# Patient Record
Sex: MALE | Race: WHITE | NOT HISPANIC OR LATINO
[De-identification: names, ages, dates, MRNs, and addresses within clinical notes are randomized per-mention and may not be internally consistent; named-entity substitution may affect disease eponyms.]

---

## 2019-05-13 PROBLEM — Z00.00 ENCOUNTER FOR PREVENTIVE HEALTH EXAMINATION: Status: ACTIVE | Noted: 2019-05-13

## 2019-05-14 ENCOUNTER — APPOINTMENT (OUTPATIENT)
Dept: GASTROENTEROLOGY | Facility: CLINIC | Age: 84
End: 2019-05-14
Payer: MEDICARE

## 2019-05-14 VITALS
BODY MASS INDEX: 32.5 KG/M2 | WEIGHT: 227 LBS | DIASTOLIC BLOOD PRESSURE: 73 MMHG | SYSTOLIC BLOOD PRESSURE: 134 MMHG | HEART RATE: 55 BPM | TEMPERATURE: 98 F | HEIGHT: 70 IN

## 2019-05-14 DIAGNOSIS — R19.7 DIARRHEA, UNSPECIFIED: ICD-10-CM

## 2019-05-14 PROCEDURE — 99204 OFFICE O/P NEW MOD 45 MIN: CPT

## 2019-05-14 RX ORDER — METOPROLOL SUCCINATE 100 MG/1
100 TABLET, EXTENDED RELEASE ORAL
Refills: 0 | Status: ACTIVE | COMMUNITY

## 2019-05-14 RX ORDER — ENALAPRIL MALEATE 20 MG/1
20 TABLET ORAL
Refills: 0 | Status: ACTIVE | COMMUNITY

## 2019-05-14 RX ORDER — LOPERAMIDE HYDROCHLORIDE 1 MG/7.5ML
1 SOLUTION ORAL
Qty: 1 | Refills: 0 | Status: ACTIVE | COMMUNITY
Start: 2019-05-14 | End: 1900-01-01

## 2019-05-14 RX ORDER — ATORVASTATIN CALCIUM 20 MG/1
20 TABLET, FILM COATED ORAL
Refills: 0 | Status: ACTIVE | COMMUNITY

## 2019-05-14 RX ORDER — MELOXICAM 7.5 MG/1
7.5 TABLET ORAL
Refills: 0 | Status: ACTIVE | COMMUNITY

## 2019-05-14 NOTE — PHYSICAL EXAM
[General Appearance - Alert] : alert [Sclera] : the sclera and conjunctiva were normal [General Appearance - In No Acute Distress] : in no acute distress [PERRL With Normal Accommodation] : pupils were equal in size, round, and reactive to light [Extraocular Movements] : extraocular movements were intact [Outer Ear] : the ears and nose were normal in appearance [Neck Appearance] : the appearance of the neck was normal [Oropharynx] : the oropharynx was normal [Neck Cervical Mass (___cm)] : no neck mass was observed [Jugular Venous Distention Increased] : there was no jugular-venous distention [Thyroid Diffuse Enlargement] : the thyroid was not enlarged [Thyroid Nodule] : there were no palpable thyroid nodules [Auscultation Breath Sounds / Voice Sounds] : lungs were clear to auscultation bilaterally [Heart Sounds] : normal S1 and S2 [Heart Rate And Rhythm] : heart rate was normal and rhythm regular [Heart Sounds Gallop] : no gallops [Murmurs] : no murmurs [Heart Sounds Pericardial Friction Rub] : no pericardial rub [Abdomen Soft] : soft [Bowel Sounds] : normal bowel sounds [Abdomen Tenderness] : non-tender [Abdomen Mass (___ Cm)] : no abdominal mass palpated [] : no hepato-splenomegaly [FreeTextEntry1] : ight upper quadrant scar

## 2019-05-14 NOTE — HISTORY OF PRESENT ILLNESS
[FreeTextEntry1] : Patient really developed watery diarrhea that lasted 2 weeks or so not related to antibiotics with borborygmi. He denies any nausea vomiting he denied rectal bleeding his medication he took Imodium which seemed to control it. A culture for Clostridium difficile and

## 2019-05-18 ENCOUNTER — MOBILE ON CALL (OUTPATIENT)
Age: 84
End: 2019-05-18

## 2019-05-20 ENCOUNTER — MOBILE ON CALL (OUTPATIENT)
Age: 84
End: 2019-05-20

## 2019-05-21 ENCOUNTER — INPATIENT (INPATIENT)
Facility: HOSPITAL | Age: 84
LOS: 20 days | Discharge: HOSPICE | End: 2019-06-11
Attending: FAMILY MEDICINE | Admitting: FAMILY MEDICINE
Payer: MEDICARE

## 2019-05-21 VITALS
HEIGHT: 66 IN | HEART RATE: 82 BPM | DIASTOLIC BLOOD PRESSURE: 75 MMHG | RESPIRATION RATE: 18 BRPM | OXYGEN SATURATION: 98 % | SYSTOLIC BLOOD PRESSURE: 145 MMHG | WEIGHT: 231.93 LBS | TEMPERATURE: 98 F

## 2019-05-21 DIAGNOSIS — Z95.1 PRESENCE OF AORTOCORONARY BYPASS GRAFT: Chronic | ICD-10-CM

## 2019-05-21 DIAGNOSIS — Z90.49 ACQUIRED ABSENCE OF OTHER SPECIFIED PARTS OF DIGESTIVE TRACT: Chronic | ICD-10-CM

## 2019-05-21 DIAGNOSIS — Z96.651 PRESENCE OF RIGHT ARTIFICIAL KNEE JOINT: Chronic | ICD-10-CM

## 2019-05-21 LAB
ALBUMIN SERPL ELPH-MCNC: 3.4 G/DL — SIGNIFICANT CHANGE UP (ref 3.3–5)
ALP SERPL-CCNC: 86 U/L — SIGNIFICANT CHANGE UP (ref 40–120)
ALT FLD-CCNC: 15 U/L — SIGNIFICANT CHANGE UP (ref 12–78)
ANION GAP SERPL CALC-SCNC: 3 MMOL/L — LOW (ref 5–17)
APPEARANCE UR: CLEAR — SIGNIFICANT CHANGE UP
AST SERPL-CCNC: 18 U/L — SIGNIFICANT CHANGE UP (ref 15–37)
BACTERIA # UR AUTO: ABNORMAL
BASOPHILS # BLD AUTO: 0.03 K/UL — SIGNIFICANT CHANGE UP (ref 0–0.2)
BASOPHILS NFR BLD AUTO: 0.5 % — SIGNIFICANT CHANGE UP (ref 0–2)
BILIRUB SERPL-MCNC: 0.7 MG/DL — SIGNIFICANT CHANGE UP (ref 0.2–1.2)
BILIRUB UR-MCNC: NEGATIVE — SIGNIFICANT CHANGE UP
BUN SERPL-MCNC: 14 MG/DL — SIGNIFICANT CHANGE UP (ref 7–23)
CALCIUM SERPL-MCNC: 9.3 MG/DL — SIGNIFICANT CHANGE UP (ref 8.5–10.1)
CHLORIDE SERPL-SCNC: 104 MMOL/L — SIGNIFICANT CHANGE UP (ref 96–108)
CO2 SERPL-SCNC: 33 MMOL/L — HIGH (ref 22–31)
COLOR SPEC: ABNORMAL
COMMENT - URINE: SIGNIFICANT CHANGE UP
CREAT SERPL-MCNC: 1.13 MG/DL — SIGNIFICANT CHANGE UP (ref 0.5–1.3)
DIFF PNL FLD: ABNORMAL
EOSINOPHIL # BLD AUTO: 0.02 K/UL — SIGNIFICANT CHANGE UP (ref 0–0.5)
EOSINOPHIL NFR BLD AUTO: 0.4 % — SIGNIFICANT CHANGE UP (ref 0–6)
GLUCOSE SERPL-MCNC: 88 MG/DL — SIGNIFICANT CHANGE UP (ref 70–99)
GLUCOSE UR QL: NEGATIVE MG/DL — SIGNIFICANT CHANGE UP
HCT VFR BLD CALC: 36.9 % — LOW (ref 39–50)
HGB BLD-MCNC: 11.9 G/DL — LOW (ref 13–17)
IMM GRANULOCYTES NFR BLD AUTO: 0.2 % — SIGNIFICANT CHANGE UP (ref 0–1.5)
KETONES UR-MCNC: ABNORMAL
LEUKOCYTE ESTERASE UR-ACNC: ABNORMAL
LIDOCAIN IGE QN: 232 U/L — SIGNIFICANT CHANGE UP (ref 73–393)
LYMPHOCYTES # BLD AUTO: 0.81 K/UL — LOW (ref 1–3.3)
LYMPHOCYTES # BLD AUTO: 14.2 % — SIGNIFICANT CHANGE UP (ref 13–44)
MAGNESIUM SERPL-MCNC: 1.8 MG/DL — SIGNIFICANT CHANGE UP (ref 1.6–2.6)
MCHC RBC-ENTMCNC: 29.2 PG — SIGNIFICANT CHANGE UP (ref 27–34)
MCHC RBC-ENTMCNC: 32.2 GM/DL — SIGNIFICANT CHANGE UP (ref 32–36)
MCV RBC AUTO: 90.4 FL — SIGNIFICANT CHANGE UP (ref 80–100)
MONOCYTES # BLD AUTO: 0.5 K/UL — SIGNIFICANT CHANGE UP (ref 0–0.9)
MONOCYTES NFR BLD AUTO: 8.8 % — SIGNIFICANT CHANGE UP (ref 2–14)
NEUTROPHILS # BLD AUTO: 4.32 K/UL — SIGNIFICANT CHANGE UP (ref 1.8–7.4)
NEUTROPHILS NFR BLD AUTO: 75.9 % — SIGNIFICANT CHANGE UP (ref 43–77)
NITRITE UR-MCNC: NEGATIVE — SIGNIFICANT CHANGE UP
PH UR: 5 — SIGNIFICANT CHANGE UP (ref 5–8)
PLATELET # BLD AUTO: 193 K/UL — SIGNIFICANT CHANGE UP (ref 150–400)
POTASSIUM SERPL-MCNC: 4 MMOL/L — SIGNIFICANT CHANGE UP (ref 3.5–5.3)
POTASSIUM SERPL-SCNC: 4 MMOL/L — SIGNIFICANT CHANGE UP (ref 3.5–5.3)
PROT SERPL-MCNC: 7 GM/DL — SIGNIFICANT CHANGE UP (ref 6–8.3)
PROT UR-MCNC: 30 MG/DL
RBC # BLD: 4.08 M/UL — LOW (ref 4.2–5.8)
RBC # FLD: 13.3 % — SIGNIFICANT CHANGE UP (ref 10.3–14.5)
RBC CASTS # UR COMP ASSIST: ABNORMAL /HPF (ref 0–4)
SODIUM SERPL-SCNC: 140 MMOL/L — SIGNIFICANT CHANGE UP (ref 135–145)
SP GR SPEC: 1.01 — SIGNIFICANT CHANGE UP (ref 1.01–1.02)
UROBILINOGEN FLD QL: 1 MG/DL
WBC # BLD: 5.69 K/UL — SIGNIFICANT CHANGE UP (ref 3.8–10.5)
WBC # FLD AUTO: 5.69 K/UL — SIGNIFICANT CHANGE UP (ref 3.8–10.5)
WBC UR QL: SIGNIFICANT CHANGE UP

## 2019-05-21 PROCEDURE — 74177 CT ABD & PELVIS W/CONTRAST: CPT | Mod: 26

## 2019-05-21 PROCEDURE — 99223 1ST HOSP IP/OBS HIGH 75: CPT

## 2019-05-21 PROCEDURE — 99285 EMERGENCY DEPT VISIT HI MDM: CPT

## 2019-05-21 PROCEDURE — 74019 RADEX ABDOMEN 2 VIEWS: CPT | Mod: 26

## 2019-05-21 RX ORDER — ASPIRIN/CALCIUM CARB/MAGNESIUM 324 MG
325 TABLET ORAL DAILY
Refills: 0 | Status: DISCONTINUED | OUTPATIENT
Start: 2019-05-21 | End: 2019-06-11

## 2019-05-21 RX ORDER — METOPROLOL TARTRATE 50 MG
100 TABLET ORAL DAILY
Refills: 0 | Status: DISCONTINUED | OUTPATIENT
Start: 2019-05-21 | End: 2019-05-27

## 2019-05-21 RX ORDER — METRONIDAZOLE 500 MG
TABLET ORAL
Refills: 0 | Status: DISCONTINUED | OUTPATIENT
Start: 2019-05-21 | End: 2019-05-24

## 2019-05-21 RX ORDER — ACETAMINOPHEN 500 MG
650 TABLET ORAL EVERY 6 HOURS
Refills: 0 | Status: DISCONTINUED | OUTPATIENT
Start: 2019-05-21 | End: 2019-06-11

## 2019-05-21 RX ORDER — HEPARIN SODIUM 5000 [USP'U]/ML
5000 INJECTION INTRAVENOUS; SUBCUTANEOUS EVERY 8 HOURS
Refills: 0 | Status: DISCONTINUED | OUTPATIENT
Start: 2019-05-21 | End: 2019-06-10

## 2019-05-21 RX ORDER — ASPIRIN/CALCIUM CARB/MAGNESIUM 324 MG
1 TABLET ORAL
Qty: 0 | Refills: 0 | DISCHARGE

## 2019-05-21 RX ORDER — ONDANSETRON 8 MG/1
4 TABLET, FILM COATED ORAL EVERY 6 HOURS
Refills: 0 | Status: DISCONTINUED | OUTPATIENT
Start: 2019-05-21 | End: 2019-05-28

## 2019-05-21 RX ORDER — CIPROFLOXACIN LACTATE 400MG/40ML
400 VIAL (ML) INTRAVENOUS EVERY 12 HOURS
Refills: 0 | Status: DISCONTINUED | OUTPATIENT
Start: 2019-05-22 | End: 2019-05-24

## 2019-05-21 RX ORDER — SODIUM CHLORIDE 9 MG/ML
500 INJECTION INTRAMUSCULAR; INTRAVENOUS; SUBCUTANEOUS ONCE
Refills: 0 | Status: COMPLETED | OUTPATIENT
Start: 2019-05-21 | End: 2019-05-21

## 2019-05-21 RX ORDER — CIPROFLOXACIN LACTATE 400MG/40ML
VIAL (ML) INTRAVENOUS
Refills: 0 | Status: DISCONTINUED | OUTPATIENT
Start: 2019-05-21 | End: 2019-05-24

## 2019-05-21 RX ORDER — METRONIDAZOLE 500 MG
500 TABLET ORAL ONCE
Refills: 0 | Status: COMPLETED | OUTPATIENT
Start: 2019-05-21 | End: 2019-05-21

## 2019-05-21 RX ORDER — CIPROFLOXACIN LACTATE 400MG/40ML
400 VIAL (ML) INTRAVENOUS ONCE
Refills: 0 | Status: COMPLETED | OUTPATIENT
Start: 2019-05-21 | End: 2019-05-21

## 2019-05-21 RX ORDER — METRONIDAZOLE 500 MG
500 TABLET ORAL EVERY 8 HOURS
Refills: 0 | Status: DISCONTINUED | OUTPATIENT
Start: 2019-05-21 | End: 2019-05-24

## 2019-05-21 RX ORDER — ATORVASTATIN CALCIUM 80 MG/1
20 TABLET, FILM COATED ORAL AT BEDTIME
Refills: 0 | Status: DISCONTINUED | OUTPATIENT
Start: 2019-05-21 | End: 2019-06-10

## 2019-05-21 RX ADMIN — ONDANSETRON 4 MILLIGRAM(S): 8 TABLET, FILM COATED ORAL at 17:38

## 2019-05-21 RX ADMIN — Medication 325 MILLIGRAM(S): at 17:38

## 2019-05-21 RX ADMIN — Medication 100 MILLIGRAM(S): at 21:10

## 2019-05-21 RX ADMIN — SODIUM CHLORIDE 500 MILLILITER(S): 9 INJECTION INTRAMUSCULAR; INTRAVENOUS; SUBCUTANEOUS at 12:30

## 2019-05-21 RX ADMIN — HEPARIN SODIUM 5000 UNIT(S): 5000 INJECTION INTRAVENOUS; SUBCUTANEOUS at 21:10

## 2019-05-21 RX ADMIN — Medication 100 MILLIGRAM(S): at 17:39

## 2019-05-21 RX ADMIN — ATORVASTATIN CALCIUM 20 MILLIGRAM(S): 80 TABLET, FILM COATED ORAL at 21:10

## 2019-05-21 RX ADMIN — Medication 200 MILLIGRAM(S): at 18:59

## 2019-05-21 RX ADMIN — Medication 20 MILLIGRAM(S): at 17:39

## 2019-05-21 RX ADMIN — Medication 1 TABLET(S): at 18:59

## 2019-05-21 RX ADMIN — Medication 100 MILLIGRAM(S): at 17:38

## 2019-05-21 RX ADMIN — SODIUM CHLORIDE 500 MILLILITER(S): 9 INJECTION INTRAMUSCULAR; INTRAVENOUS; SUBCUTANEOUS at 11:30

## 2019-05-21 NOTE — ED PROVIDER NOTE - CARE PLAN
Principal Discharge DX:	Diarrhea, unspecified type  Secondary Diagnosis:	Dehydration  Secondary Diagnosis:	Weakness

## 2019-05-21 NOTE — CONSULT NOTE ADULT - SUBJECTIVE AND OBJECTIVE BOX
Patient is a 86y old  Male who presents with a chief complaint of diarrhea    HPI:  Patient has had three weeks of progressive diarrhea. Patient has not been on any new medications, travel antibiotics. Outpatient cdiff was negative. Patient continue to do poorly - came to ER. CT shows pancolitis.     PAST MEDICAL & SURGICAL HISTORY:      MEDICATIONS  (STANDING):  aspirin 325 milliGRAM(s) Oral daily  atorvastatin 20 milliGRAM(s) Oral at bedtime  enalapril 20 milliGRAM(s) Oral daily  metoprolol succinate  milliGRAM(s) Oral daily    MEDICATIONS  (PRN):      Allergies    No Known Allergies    Intolerances        SOCIAL HISTORY:    FAMILY HISTORY:      Vital Signs Last 24 Hrs  T(C): 36.3 (21 May 2019 13:49), Max: 36.6 (21 May 2019 10:49)  T(F): 97.3 (21 May 2019 13:49), Max: 97.9 (21 May 2019 10:49)  HR: 86 (21 May 2019 14:57) (79 - 86)  BP: 139/58 (21 May 2019 14:57) (126/60 - 145/75)  BP(mean): --  RR: 18 (21 May 2019 14:57) (18 - 18)  SpO2: 98% (21 May 2019 14:57) (98% - 100%)    PHYSICAL EXAM:    Respiratory: CTAB  Cardiovascular: S1 and S2, RRR, no M/G/R  Gastrointestinal: BS+, softly distended, minimal tenderness      LABS:                        11.9   5.69  )-----------( 193      ( 21 May 2019 11:06 )             36.9     05-21    140  |  104  |  14  ----------------------------<  88  4.0   |  33<H>  |  1.13    Ca    9.3      21 May 2019 11:06  Mg     1.8     05-21    TPro  7.0  /  Alb  3.4  /  TBili  0.7  /  DBili  x   /  AST  18  /  ALT  15  /  AlkPhos  86  05-21      LIVER FUNCTIONS - ( 21 May 2019 11:06 )  Alb: 3.4 g/dL / Pro: 7.0 gm/dL / ALK PHOS: 86 U/L / ALT: 15 U/L / AST: 18 U/L / GGT: x             RADIOLOGY & ADDITIONAL STUDIES:

## 2019-05-21 NOTE — H&P ADULT - NSICDXPASTSURGICALHX_GEN_ALL_CORE_FT
PAST SURGICAL HISTORY:  H/O total knee replacement, right     History of cholecystectomy     S/P CABG x 5

## 2019-05-21 NOTE — CONSULT NOTE ADULT - ASSESSMENT
87 yo male with new onset of pancolitis. DDx includes infection, inflammation (i.e. UC). Would recheck Cdiff, fecal calprotectin. Empiric antibiotics. If no improvement, will do sigmoidoscopy/colonoscopy to look for UC. Will follow - thanks!

## 2019-05-21 NOTE — H&P ADULT - ASSESSMENT
#Pancolitis with extensive pelvic inflammation  Admit to med-surg  GI eval DR Morse appreciated  Imaging studies reviewed  Gi PCR, Cdiff  Start IV Cipro+ Flagyl  ID eval, further IV abx per ID  Urine c/s, blood c/s  Urology eval DR Key (pt supposed to have cystoscopy w bx)    #CAD s/p CABG  Cont cardiac meds    #HTN/hyperlipidemia  Stable    #Dispo- inpatient admit. D/w pt, daughter at bedside and DR Morse

## 2019-05-21 NOTE — ED ADULT NURSE REASSESSMENT NOTE - NS ED NURSE REASSESS COMMENT FT1
Urine sent of for culture and UA via straight cath using sterile technique. Antibiotics ordered stat-call placed to Dr. Luna inquiring about necessity of blood cultures prior to administration-waiting for call back.

## 2019-05-21 NOTE — H&P ADULT - NSHPPHYSICALEXAM_GEN_ALL_CORE
Vital Signs Last 24 Hrs  T(C): 36.3 (21 May 2019 13:49), Max: 36.6 (21 May 2019 10:49)  T(F): 97.3 (21 May 2019 13:49), Max: 97.9 (21 May 2019 10:49)  HR: 86 (21 May 2019 14:57) (79 - 86)  BP: 139/58 (21 May 2019 14:57) (126/60 - 145/75)  BP(mean): --  RR: 18 (21 May 2019 14:57) (18 - 18)  SpO2: 98% (21 May 2019 14:57) (98% - 100%)

## 2019-05-21 NOTE — ED ADULT NURSE REASSESSMENT NOTE - NS ED NURSE REASSESS COMMENT FT1
Pt received alert and oriented vitals taken and stable. Pt denies pain or any diarrhea at this time-pt told to inform staff when he goes. Pt requests to wear a brief-educated on the risk of skin break down and the importance of keeping skin clean and dry. Daughter at bedside. Plan of care discussed-wait time and explained. Pt aware he is admitted and will be moved upstairs once orders are placed and bed is ready. All needs addressed and safety maintained. Pt received alert and oriented vitals taken and stable. Pt denies pain or any diarrhea at this time-pt told to inform staff when he goes. Pt requests to wear a brief-educated on the risk of skin break down and the importance of keeping skin clean and dry. +2 edema noted to b/l LE/feet, pt states "It has been like this for a while".  Daughter at bedside. Plan of care discussed-wait time and explained. Pt aware he is admitted and will be moved upstairs once orders are placed and bed is ready. All needs addressed and safety maintained.

## 2019-05-21 NOTE — ED PROVIDER NOTE - OBJECTIVE STATEMENT
87 y/o with PMHx of HTN, HLD presents to the ED BIBEMS from home c/o lower abd pain. Pt states he had diarrhea then consistent lose stool. As per daughter, pt has been having diarrhea for a month. They went to his Internist a couple of times but sx are not improving and he was becoming lethargic. Had a c-diff done with Internist and with negative results. Daughter states, the past week his sx changed from diarrhea to lose stool. Pt had a kit to be done this morning for further testing but pt wanted to go to the ED this morning. Urologist found spot in bladder and had a biopsy scheduled for tomorrow. Pt states he has been feeling very gassy. Daughter states the pt's abd is gurgling. Daughter states he would use the bathroom for 1-3 hours. She also states his speech is slow at baseline, but there is a gurgling in his speech for the past few months. Hx of Cholecystectomy in 1990. No recent travel. Recent antibiotic use. On Metoprolol 100 mg, Atorvastatin 20 mg, Enalapril 20mg,  mg every other day, Meloxicam 7.5 mg. Internist: Dr. Lundberg. Urologist: Dr. Anderson 87 y/o with PMHx of HTN, HLD presents to the ED BIBEMS from home c/o lower abd pain. Pt states he had diarrhea then consistent lose stool. As per daughter, pt has been having diarrhea for a month. They went to his Internist a couple of times but sx are not improving and he was becoming lethargic. Had a c-diff done with Internist and with negative results. Daughter states, the past week his sx changed from diarrhea to lose stool. Pt had a kit to be done this morning for further testing but pt wanted to go to the ED this morning. Urologist found spot in bladder and had a biopsy scheduled for tomorrow. Pt states he has been feeling very gassy. Daughter states the pt's abd is gurgling. Daughter states he would use the bathroom for 1-3 hours. She also states his speech is slow at baseline, but there is a gurgling in his speech for the past few months. Hx of Cholecystectomy in 1990. No recent travel. Recent antibiotic use. On Metoprolol 100 mg, Atorvastatin 20 mg, Enalapril 20mg,  mg every other day, Meloxicam 7.5 mg. Internist: Dr. Lundberg & Dr. Lee. Urologist: Dr. Anderson

## 2019-05-21 NOTE — ED ADULT NURSE REASSESSMENT NOTE - NS ED NURSE REASSESS COMMENT FT1
Blood cultures sent-antibiotics started. Pt tolerating tea and toast-given zofran for nausea. Small stage two to right inner buttock noted during care. Pt incontinent of urine/stool. Blood cultures sent-antibiotics started. Pt tolerating tea and toast-given zofran for nausea. Small stage two to right inner buttock noted during care. Pt incontinent of urine/stool, small liquid BM, not enough for sample that is ordered.

## 2019-05-21 NOTE — ED ADULT NURSE REASSESSMENT NOTE - NS ED NURSE REASSESS COMMENT FT1
Pt updated on status, Xrays done, pt waiting on CT abd. Pt and family aware. Will continue to monitor.

## 2019-05-21 NOTE — ED ADULT NURSE REASSESSMENT NOTE - NSIMPLEMENTINTERV_GEN_ALL_ED
Implemented All Fall with Harm Risk Interventions:  [place] to call system. Call bell, personal items and telephone within reach. Instruct patient to call for assistance. Room bathroom lighting operational. Non-slip footwear when patient is off stretcher. Physically safe environment: no spills, clutter or unnecessary equipment. Stretcher in lowest position, wheels locked, appropriate side rails in place. Provide visual cue, wrist band, yellow gown, etc. Monitor gait and stability. Monitor for mental status changes and reorient to person, place, and time. Review medications for side effects contributing to fall risk. Reinforce activity limits and safety measures with patient and family. Provide visual clues: red socks.

## 2019-05-21 NOTE — H&P ADULT - HISTORY OF PRESENT ILLNESS
85yo/M with PMH CAD s/p CABG in 2005, HTN, hyperlipidemia, OA s/p RT TKR, presented for evaluation of diarrhea. Patient states he had symptoms for almost a month, had been seen by GI as outpatient and had cdiff done about a week ago that was negative. He continues to have diarrhea now associated with urinary incontinence at time and dysuria. Patient was seen by DR Key as outpatient and supposed to have cystoscopy with biopsy last week but cancelled 2 to not feeling well with diarrhea and feeling weak. He denies chest pain or SOB. No fever or chills. Feels abdomen is distended and uncomfortable

## 2019-05-22 LAB
ANION GAP SERPL CALC-SCNC: 5 MMOL/L — SIGNIFICANT CHANGE UP (ref 5–17)
BUN SERPL-MCNC: 14 MG/DL — SIGNIFICANT CHANGE UP (ref 7–23)
C DIFF BY PCR RESULT: SIGNIFICANT CHANGE UP
C DIFF TOX GENS STL QL NAA+PROBE: SIGNIFICANT CHANGE UP
CALCIUM SERPL-MCNC: 8.9 MG/DL — SIGNIFICANT CHANGE UP (ref 8.5–10.1)
CHLORIDE SERPL-SCNC: 104 MMOL/L — SIGNIFICANT CHANGE UP (ref 96–108)
CO2 SERPL-SCNC: 32 MMOL/L — HIGH (ref 22–31)
CREAT SERPL-MCNC: 1.18 MG/DL — SIGNIFICANT CHANGE UP (ref 0.5–1.3)
GLUCOSE SERPL-MCNC: 87 MG/DL — SIGNIFICANT CHANGE UP (ref 70–99)
HCT VFR BLD CALC: 36.2 % — LOW (ref 39–50)
HGB BLD-MCNC: 11.4 G/DL — LOW (ref 13–17)
MCHC RBC-ENTMCNC: 28.6 PG — SIGNIFICANT CHANGE UP (ref 27–34)
MCHC RBC-ENTMCNC: 31.5 GM/DL — LOW (ref 32–36)
MCV RBC AUTO: 90.7 FL — SIGNIFICANT CHANGE UP (ref 80–100)
PLATELET # BLD AUTO: 168 K/UL — SIGNIFICANT CHANGE UP (ref 150–400)
POTASSIUM SERPL-MCNC: 3.9 MMOL/L — SIGNIFICANT CHANGE UP (ref 3.5–5.3)
POTASSIUM SERPL-SCNC: 3.9 MMOL/L — SIGNIFICANT CHANGE UP (ref 3.5–5.3)
RBC # BLD: 3.99 M/UL — LOW (ref 4.2–5.8)
RBC # FLD: 13.3 % — SIGNIFICANT CHANGE UP (ref 10.3–14.5)
SODIUM SERPL-SCNC: 141 MMOL/L — SIGNIFICANT CHANGE UP (ref 135–145)
WBC # BLD: 5.48 K/UL — SIGNIFICANT CHANGE UP (ref 3.8–10.5)
WBC # FLD AUTO: 5.48 K/UL — SIGNIFICANT CHANGE UP (ref 3.8–10.5)

## 2019-05-22 PROCEDURE — 99232 SBSQ HOSP IP/OBS MODERATE 35: CPT

## 2019-05-22 RX ADMIN — Medication 325 MILLIGRAM(S): at 11:55

## 2019-05-22 RX ADMIN — Medication 100 MILLIGRAM(S): at 12:00

## 2019-05-22 RX ADMIN — HEPARIN SODIUM 5000 UNIT(S): 5000 INJECTION INTRAVENOUS; SUBCUTANEOUS at 12:51

## 2019-05-22 RX ADMIN — Medication 1 TABLET(S): at 11:56

## 2019-05-22 RX ADMIN — Medication 100 MILLIGRAM(S): at 12:50

## 2019-05-22 RX ADMIN — HEPARIN SODIUM 5000 UNIT(S): 5000 INJECTION INTRAVENOUS; SUBCUTANEOUS at 23:02

## 2019-05-22 RX ADMIN — Medication 20 MILLIGRAM(S): at 05:13

## 2019-05-22 RX ADMIN — Medication 100 MILLIGRAM(S): at 06:22

## 2019-05-22 RX ADMIN — ATORVASTATIN CALCIUM 20 MILLIGRAM(S): 80 TABLET, FILM COATED ORAL at 23:02

## 2019-05-22 RX ADMIN — HEPARIN SODIUM 5000 UNIT(S): 5000 INJECTION INTRAVENOUS; SUBCUTANEOUS at 05:14

## 2019-05-22 RX ADMIN — Medication 200 MILLIGRAM(S): at 05:13

## 2019-05-22 RX ADMIN — Medication 100 MILLIGRAM(S): at 23:02

## 2019-05-22 RX ADMIN — Medication 200 MILLIGRAM(S): at 17:35

## 2019-05-22 NOTE — CHART NOTE - NSCHARTNOTEFT_GEN_A_CORE
Upon Nutritional Assessment by the Registered Dietitian your patient was determined to meet criteria / has evidence of the following diagnosis/diagnoses:          [ ]  Mild Protein Calorie Malnutrition        [ ]  Moderate Protein Calorie Malnutrition        [ x] Severe Protein Calorie Malnutrition        [ ] Unspecified Protein Calorie Malnutrition        [ ] Underweight / BMI <19        [ ] Morbid Obesity / BMI > 40      Findings:    Pt c/o persistent diarrhea x1 month. Pt had outpt cdiff test which came back (-). CT showed pancolitis; noted source of pancolitis unknown possibly infectious. Pt is noted to report great improvement in overall feeling after receiving abx. Pt w/ daughter at bedside to assist w/ hx. Pt c/o of poor PO intake x1 month r/t persistent diarrhea and lack of appetite. Over this time pt likely not meeting at least 50% of needs x 1 month. NFPE significant for muscle wasting: severe: temporal; moderate: clavicle, deltoid, interosseous, patellar; fat depletion: severe: occipital, buccal; mild: triceps and ribs. Pt and daughter report # and admission wt shows 214# revealing 31# wt loss over the past 1 month which is clinically significant. Pt w/ noted mild/moderate edema in LE possibly masking further wt loss. Based on above pt meets criteria for severe malnutrition in the context of acute illness. Pt noted w/ stage 3 pressure ulcer on right buttocks requiring increased protein intake to promote wound healing.     Findings as based on:  •  Comprehensive nutrition assessment and consultation  •  Calorie counts (nutrient intake analysis)  •  Food acceptance and intake status from observations by staff  •  Follow up  •  Patient education  •  Intervention secondary to interdisciplinary rounds  •   concerns      Treatment:      1. add ensure enlive 1x/day and gelatein 1x/day.   2. encourage intake at each meal w/ adequate protein.   3. biweekly wt.   4. add 500 mg vitamin c daily and 220 mg zinc BID x 10days to promote wound healing.     The following diet has been recommended:      PROVIDER Section:     By signing this assessment you are acknowledging and agree with the diagnosis/diagnoses assigned by the Registered Dietitian    Comments:

## 2019-05-22 NOTE — DIETITIAN INITIAL EVALUATION ADULT. - OTHER INFO
Pt seen as consult for diarrhea and stage 3 pressure ulcer. Pt is 85 yo M w/ PMH CAD s/p CABG, HTN, hyperlipidemia, OA s/p RT TKR presented for evaluation of diarrhea. Pt c/o persistent diarhhea x1 month. Pt had outpt cdiff test which came back (-). CT showed pancolitis; noted source of pancolitis unknown possibly infectious. Pt is noted to report great improvement in overall feeling after receiving abx. Pt w/ daughter at bedside to assist w/ hx. Pt c/o of poor PO intake x1 month r/t persistent diarrhea and lack of appetite. Over this time pt likey not meeting at least 50% of needs x 1 month. NFPE significant for muscle wasting: severe: temporal; moderate: clavicle, deltoid, interosseous, patellar; fat depletion: severe: occipital, buccal; mild: triceps and ribs. Pt and daughter report # and admisson wt shows 214# revealing 31# wt loss over the past 1 month which is clinically significant. Pt w/ noted mild/moderate edema in LE possibly masking further wt loss. Based on above pt meets criteria for severe malnutrition in the context of acute illness. Pt noted w/ stage 3 pressure ulcer on right buttocks requiring increased protein intake to promote wound healing. Iftikhar 19.  No c/o chewing/swallowing difficulties. Recommendations: 1. add ensure enlive 1x/day and gelatein 1x/day. 2. encourage intake at each meal w/ adequate protein. 3. biweekly wt. 4. add 500 mg vitamin c daily and 220 mg zinc BID x 10days to promote wound healing.

## 2019-05-22 NOTE — DIETITIAN INITIAL EVALUATION ADULT. - PERTINENT MEDS FT
MEDICATIONS  (STANDING):  aspirin 325 milliGRAM(s) Oral daily  atorvastatin 20 milliGRAM(s) Oral at bedtime  ciprofloxacin   IVPB      ciprofloxacin   IVPB 400 milliGRAM(s) IV Intermittent every 12 hours  enalapril 20 milliGRAM(s) Oral daily  heparin  Injectable 5000 Unit(s) SubCutaneous every 8 hours  metoprolol succinate  milliGRAM(s) Oral daily  metroNIDAZOLE  IVPB      metroNIDAZOLE  IVPB 500 milliGRAM(s) IV Intermittent every 8 hours  multivitamin 1 Tablet(s) Oral daily    MEDICATIONS  (PRN):  acetaminophen   Tablet .. 650 milliGRAM(s) Oral every 6 hours PRN Temp greater or equal to 38C (100.4F), Mild Pain (1 - 3)  aluminum hydroxide/magnesium hydroxide/simethicone Suspension 30 milliLiter(s) Oral every 4 hours PRN Dyspepsia  ondansetron Injectable 4 milliGRAM(s) IV Push every 6 hours PRN Nausea

## 2019-05-22 NOTE — PROGRESS NOTE ADULT - SUBJECTIVE AND OBJECTIVE BOX
Patient is a 86y old  Male who presents with a chief complaint of diarrhea (21 May 2019 16:24)      HPI:  pt feeling much better today  feels like the abx have been a miracle  no abdominal pain and much decreased diarrhea overnight    PAST MEDICAL & SURGICAL HISTORY:  Osteoarthrosis  Hyperlipidemia  Hypertension  CAD (coronary artery disease)  H/O total knee replacement, right  History of cholecystectomy  S/P CABG x 5    MEDICATIONS  (STANDING):  aspirin 325 milliGRAM(s) Oral daily  atorvastatin 20 milliGRAM(s) Oral at bedtime  ciprofloxacin   IVPB      ciprofloxacin   IVPB 400 milliGRAM(s) IV Intermittent every 12 hours  enalapril 20 milliGRAM(s) Oral daily  heparin  Injectable 5000 Unit(s) SubCutaneous every 8 hours  metoprolol succinate  milliGRAM(s) Oral daily  metroNIDAZOLE  IVPB      metroNIDAZOLE  IVPB 500 milliGRAM(s) IV Intermittent every 8 hours  multivitamin 1 Tablet(s) Oral daily    MEDICATIONS  (PRN):  acetaminophen   Tablet .. 650 milliGRAM(s) Oral every 6 hours PRN Temp greater or equal to 38C (100.4F), Mild Pain (1 - 3)  aluminum hydroxide/magnesium hydroxide/simethicone Suspension 30 milliLiter(s) Oral every 4 hours PRN Dyspepsia  ondansetron Injectable 4 milliGRAM(s) IV Push every 6 hours PRN Nausea    Allergies  No Known Allergies    REVIEW OF SYSTEMS:    CONSTITUTIONAL: weakness  RESPIRATORY: No cough, wheezing, hemoptysis; No shortness of breath  CARDIOVASCULAR: No chest pain or palpitations  GASTROINTESTINAL: as above  All other review of systems is negative unless indicated above.    Vital Signs Last 24 Hrs  T(C): 36.4 (22 May 2019 05:11), Max: 36.7 (21 May 2019 16:57)  T(F): 97.5 (22 May 2019 05:11), Max: 98.1 (21 May 2019 16:57)  HR: 57 (22 May 2019 05:11) (57 - 109)  BP: 109/48 (22 May 2019 05:11) (109/48 - 145/75)  BP(mean): --  RR: 18 (22 May 2019 05:11) (18 - 18)  SpO2: 97% (22 May 2019 05:11) (97% - 100%)    PHYSICAL EXAM:    Constitutional: NAD  Respiratory: CTA  Cardiovascular: S1 and S2  Gastrointestinal: BS+, soft, mild distention but no focal tenderness      LABS:                        11.9   5.69  )-----------( 193      ( 21 May 2019 11:06 )             36.9     05-21    140  |  104  |  14  ----------------------------<  88  4.0   |  33<H>  |  1.13    Ca    9.3      21 May 2019 11:06  Mg     1.8     05-21    TPro  7.0  /  Alb  3.4  /  TBili  0.7  /  DBili  x   /  AST  18  /  ALT  15  /  AlkPhos  86  05-21      LIVER FUNCTIONS - ( 21 May 2019 11:06 )  Alb: 3.4 g/dL / Pro: 7.0 gm/dL / ALK PHOS: 86 U/L / ALT: 15 U/L / AST: 18 U/L / GGT: x             RADIOLOGY & ADDITIONAL STUDIES:

## 2019-05-22 NOTE — DIETITIAN INITIAL EVALUATION ADULT. - PERTINENT LABORATORY DATA
05-22 Na141 mmol/L Glu 87 mg/dL K+ 3.9 mmol/L Cr  1.18 mg/dL BUN 14 mg/dL Phos n/a   Alb n/a   PAB n/a

## 2019-05-22 NOTE — PHYSICAL THERAPY INITIAL EVALUATION ADULT - ADDITIONAL COMMENTS
Patient has a HHA M-W-F x6 hours per day for housework.  Patient independent in his ADL.  Does not drive, Household Ambulator.

## 2019-05-22 NOTE — CONSULT NOTE ADULT - SUBJECTIVE AND OBJECTIVE BOX
85yo/M with PMH CAD s/p CABG in , HTN, hyperlipidemia, OA s/p RT TKR, presented for evaluation of diarrhea. Patient states he had symptoms for almost a month, had been seen by GI as outpatient and had cdiff done about a week ago that was negative. He continues to have diarrhea now associated with urinary incontinence at time and dysuria.     Patient was seen by Dr. Sharp  as an outpatient and underwent a workup for MH in including a renal ultrasound, urine cytology and cystoscopy. which revealed an area of inflammation and erythema.  He was scheduled for a cystoscopy with biopsy 19buy  cancelled stating that he was notfeeling well with diarrhea and feeling weak. He denies chest pain or SOB. No fever or chills. Feels abdomen is distended and uncomfortable        PMH: as above  PSH: as above  Meds: per reconciliation sheet, noted below  MEDICATIONS  (STANDING):  aspirin 325 milliGRAM(s) Oral daily  atorvastatin 20 milliGRAM(s) Oral at bedtime  ciprofloxacin   IVPB      ciprofloxacin   IVPB 400 milliGRAM(s) IV Intermittent every 12 hours  enalapril 20 milliGRAM(s) Oral daily  heparin  Injectable 5000 Unit(s) SubCutaneous every 8 hours  metoprolol succinate  milliGRAM(s) Oral daily  metroNIDAZOLE  IVPB      metroNIDAZOLE  IVPB 500 milliGRAM(s) IV Intermittent every 8 hours  multivitamin 1 Tablet(s) Oral daily    MEDICATIONS  (PRN):  acetaminophen   Tablet .. 650 milliGRAM(s) Oral every 6 hours PRN Temp greater or equal to 38C (100.4F), Mild Pain (1 - 3)  aluminum hydroxide/magnesium hydroxide/simethicone Suspension 30 milliLiter(s) Oral every 4 hours PRN Dyspepsia  ondansetron Injectable 4 milliGRAM(s) IV Push every 6 hours PRN Nausea    Allergies    No Known Allergies    Intolerances      Social: no smoking, no alcohol, no illegal drugs; no recent travel, no exposure to TB  FAMILY HISTORY:  Family history of premature CAD     no history of premature cardiovascular disease in first degree relatives  ROS: the patient denies fever, no chills, no HA, no dizziness, no sore throat, no blurry vision, no CP, no palpitations, no SOB, no cough, no abdominal pain, no N/V, no dysuria, no leg pain, no claudication, no rash, no joint aches, no rectal pain or bleeding, no night sweats  All other systems reviewed and are negative    Vital Signs Last 24 Hrs  T(C): 36.3 (22 May 2019 11:56), Max: 36.7 (21 May 2019 16:57)  T(F): 97.3 (22 May 2019 11:56), Max: 98.1 (21 May 2019 16:57)  HR: 64 (22 May 2019 12:00) (55 - 109)  BP: 119/- (22 May 2019 12:00) (105/36 - 139/58)  BP(mean): --  RR: 18 (22 May 2019 11:56) (18 - 18)  SpO2: 99% (22 May 2019 11:56) (97% - 100%)  Daily Height in cm: 177.8 (21 May 2019 19:11)    Daily Weight in k.7 (22 May 2019 13:24)    PE:    Constitutional: frail looking  HEENT: NC/AT, EOMI, PERRLA, conjunctivae clear; ears and nose atraumatic; pharynx clear  Neck: supple; thyroid not palpable  Back: no tenderness  Respiratory: respiratory effort normal; clear to auscultation  Cardiovascular: S1S2 regular, no murmurs  Abdomen: soft, not tender, not distended, positive BS; no liver or spleen organomegaly  Genitourinary: no suprapubic tenderness  Musculoskeletal: no muscle tenderness, no joint swelling or tenderness  Neurological/ Psychiatric: AxOx3, judgement and insight normal;  moving all extremities  Skin: no rashes; no palpable lesions    Labs: all available labs reviewed                        11.4   5.48  )-----------( 168      ( 22 May 2019 08:26 )             36.2         141  |  104  |  14  ----------------------------<  87  3.9   |  32<H>  |  1.18    Ca    8.9      22 May 2019 08:26  Mg     1.8         TPro  7.0  /  Alb  3.4  /  TBili  0.7  /  DBili  x   /  AST  18  /  ALT  15  /  AlkPhos  86             < from: CT Abdomen and Pelvis w/ Oral Cont and w/ IV Cont (19 @ 14:15) >    EXAM:  CT ABDOMEN AND PELVIS OC IC                            PROCEDURE DATE:  2019          INTERPRETATION:  DATE: 2019 2:15 PM  COMPARISON: None  CLINICAL INFORMATION: Diarrhea, abdominal distention  PROCEDURE:  CT of the Abdomen and Pelvis was performed withintravenous   contrast.  90 ml of Omnipaque 350 was injected intravenously. Oral   contrast was administered        FINDINGS:    LOWER CHEST: Coronary vascular calcification. Trace right pleural   effusion with minimal bibasilar atelectasis.    ABDOMEN AND PELVIS:    LIVER: within normal limits.  BILE DUCTS: Minimal biliary dilatation.  GALLBLADDER: Prior cholecystectomy.  PANCREAS: within normal limits.  SPLEEN: within normal limits.    ADRENALS: within normal limits.  KIDNEYS, URETERS AND BLADDER: Kidneys enhance bilaterally and   symmetrically. Bilateral chronic UPJ obstructions. Bilateral parapelvic   cysts. Exophytic anterior right upper pole renal cyst. Additional   subcentimeter hypodensities bilaterally to smallto characterize. No   intrarenal calculi. Ureters unremarkable. Marked thickening of the right   lateral and anterior bladder wall.    REPRODUCTIVE ORGANS: Extensive infiltrative changes in the pelvis and   presacral region with additional small ascites. Infiltrative changes   along the bilateral pelvic sidewall.    BOWEL: Pancolonic thickening with pericolonic infiltrative changes   compatible with pancolitis, differential including infectious versus   inflammatory etiology with consideration for C. Difficile infection.   Mildly prominent small bowel loops without evidence of obstruction.   Normal appendix. Thickening along the gastric antrum, may represent   gastritis however recommend direct visualization to exclude mucosal   abnormality.    PERITONEUM: no trace to small ascites. Extensive pelvic infiltrative   changes. No free air. No discrete drainable fluid collections.    VESSELS: Atherosclerotic changes .  RETROPERITONEUM: Within normal limits.      ABDOMINAL WALL: within normal limits.  BONES: Degenerative changes and osteopenia.    IMPRESSION:      Pancolonic thickening with pericolonic infiltrative changes compatible   with pancolitis, differential including infectious versus inflammatory   etiology with consideration for C. Difficile infection. Mildly prominent   small bowel loops without evidence of obstruction    Thickening along the gastric antrum, may represent gastritis however   recommend direct visualization to exclude mucosal abnormality.      Marked thickeningalong the right lateral and anterior bladder wall   recommend correlation with urinalysis as well as direct visualization to   exclude neoplasm.    Extensive infiltrate changes in the pelvic fat nonspecific in nature.        < end of copied text >            Assessment and Recommendation:   85yo/M with PMH CAD s/p CABG in , HTN, hyperlipidemia, OA s/p RT TKR, presented for evaluation of diarrhea. Patient states he had symptoms for almost a month, had been seen by GI as outpatient and had cdiff done about a week ago that was negative. He continues to have diarrhea now associated with urinary incontinence at time and dysuria.     Patient was seen by Dr. Sharp  as an outpatient and underwent a workup for MH in including a renal ultrasound, urine cytology and cystoscopy. which revealed an area of inflammation and erythema.  He was scheduled for a cystoscopy with biopsy 19buy  cancelled stating that he was notfeeling well with diarrhea and feeling weak. He denies chest pain or SOB. No fever or chills. Feels abdomen is distended and uncomfortable      CT scan - bilateral parapelvic renal cysts and chronic bilateral UPJ obstructions.  No indication for urologic intervention in this 85 yo male (asymptomatic UPJs)  cystoscopy and bladder biopsies will be rescheduled as an outpatient with Dr. Briseno.

## 2019-05-22 NOTE — PHYSICAL THERAPY INITIAL EVALUATION ADULT - GENERAL OBSERVATIONS, REHAB EVAL
Patient received in bed on C-diff precautions, son at bedside.  Patient refused session, c/o diarrhea. Will attempt tomorrow. Patient received in bed on C-diff precautions,   Patient refused out of bed activities, c/o diarrhea.

## 2019-05-22 NOTE — PROGRESS NOTE ADULT - ASSESSMENT
87yo male with cystitis, ?bladder cancer and pancolitis  pt mch improved on abx  await stool studies r/o infectious cause of pancolitis  po as tolerated, urology to see

## 2019-05-22 NOTE — CONSULT NOTE ADULT - ASSESSMENT
87yo/M with PMH CAD s/p CABG in 2005, HTN, hyperlipidemia, OA s/p RT TKR, admitted on 5/21 for evaluation of 4 weeks diarrhea that started as pasty stool but progressed to liquid watery stool. No prior antibiotics, no recent travel or food ingestion. No sick contacts and notes improvement in diarrhea since starting antibiotics. No abdominal pain and no fever. No nausea or vomiting.  1. Patient admitted with diarrhea, found to have pan colitis, infectious versus inflammatory  - follow up cultures   - iv hydration and supportive care   - oxygen and nebs as needed   - serial cbc and monitor temperature   - reviewed prior medical records to evaluate for resistant or atypical pathogens   - agree with flagyl and cipro as ordered, given that patient feels markedly better  - GI PCR pending  - urology evaluation may be a consideration given imaging results  2. other issues; per medicine

## 2019-05-22 NOTE — CONSULT NOTE ADULT - SUBJECTIVE AND OBJECTIVE BOX
Patient is a 86y old  Male who presents with a chief complaint of diarrhea (22 May 2019 07:38)    HPI:  87yo/M with PMH CAD s/p CABG in 2005, HTN, hyperlipidemia, OA s/p RT TKR, admitted on  for evaluation of 4 weeks diarrhea that started as pasty stool but progressed to liquid watery stool. No prior antibiotics, no recent travel or food ingestion. No sick contacts and notes improvement in diarrhea since starting antibiotics. No abdominal pain and no fever. No nausea or vomiting.          PMH: as above  PSH: as above  Meds: per reconciliation sheet, noted below  MEDICATIONS  (STANDING):  aspirin 325 milliGRAM(s) Oral daily  atorvastatin 20 milliGRAM(s) Oral at bedtime  ciprofloxacin   IVPB      ciprofloxacin   IVPB 400 milliGRAM(s) IV Intermittent every 12 hours  enalapril 20 milliGRAM(s) Oral daily  heparin  Injectable 5000 Unit(s) SubCutaneous every 8 hours  metoprolol succinate  milliGRAM(s) Oral daily  metroNIDAZOLE  IVPB      metroNIDAZOLE  IVPB 500 milliGRAM(s) IV Intermittent every 8 hours  multivitamin 1 Tablet(s) Oral daily    MEDICATIONS  (PRN):  acetaminophen   Tablet .. 650 milliGRAM(s) Oral every 6 hours PRN Temp greater or equal to 38C (100.4F), Mild Pain (1 - 3)  aluminum hydroxide/magnesium hydroxide/simethicone Suspension 30 milliLiter(s) Oral every 4 hours PRN Dyspepsia  ondansetron Injectable 4 milliGRAM(s) IV Push every 6 hours PRN Nausea    Allergies    No Known Allergies    Intolerances      Social: no smoking, no alcohol, no illegal drugs; no recent travel, no exposure to TB  FAMILY HISTORY:  Family history of premature CAD     no history of premature cardiovascular disease in first degree relatives  ROS: the patient denies fever, no chills, no HA, no dizziness, no sore throat, no blurry vision, no CP, no palpitations, no SOB, no cough, no abdominal pain, no N/V, no dysuria, no leg pain, no claudication, no rash, no joint aches, no rectal pain or bleeding, no night sweats  All other systems reviewed and are negative    Vital Signs Last 24 Hrs  T(C): 36.3 (22 May 2019 11:56), Max: 36.7 (21 May 2019 16:57)  T(F): 97.3 (22 May 2019 11:56), Max: 98.1 (21 May 2019 16:57)  HR: 64 (22 May 2019 12:00) (55 - 109)  BP: 119/- (22 May 2019 12:00) (105/36 - 139/58)  BP(mean): --  RR: 18 (22 May 2019 11:56) (18 - 18)  SpO2: 99% (22 May 2019 11:56) (97% - 100%)  Daily Height in cm: 177.8 (21 May 2019 19:11)    Daily Weight in k.7 (22 May 2019 13:24)    PE:    Constitutional: frail looking  HEENT: NC/AT, EOMI, PERRLA, conjunctivae clear; ears and nose atraumatic; pharynx clear  Neck: supple; thyroid not palpable  Back: no tenderness  Respiratory: respiratory effort normal; clear to auscultation  Cardiovascular: S1S2 regular, no murmurs  Abdomen: soft, not tender, not distended, positive BS; no liver or spleen organomegaly  Genitourinary: no suprapubic tenderness  Musculoskeletal: no muscle tenderness, no joint swelling or tenderness  Neurological/ Psychiatric: AxOx3, judgement and insight normal;  moving all extremities  Skin: no rashes; no palpable lesions    Labs: all available labs reviewed                        11.4   5.48  )-----------( 168      ( 22 May 2019 08:26 )             36.2     05-    141  |  104  |  14  ----------------------------<  87  3.9   |  32<H>  |  1.18    Ca    8.9      22 May 2019 08:26  Mg     1.8         TPro  7.0  /  Alb  3.4  /  TBili  0.7  /  DBili  x   /  AST  18  /  ALT  15  /  AlkPhos  86       LIVER FUNCTIONS - ( 21 May 2019 11:06 )  Alb: 3.4 g/dL / Pro: 7.0 gm/dL / ALK PHOS: 86 U/L / ALT: 15 U/L / AST: 18 U/L / GGT: x           Urinalysis Basic - ( 21 May 2019 16:52 )    Color: [place] / Appearance: Clear / S.010 / pH: x  Gluc: x / Ketone: Small  / Bili: Negative / Urobili: 1 mg/dL   Blood: x / Protein: 30 mg/dL / Nitrite: Negative   Leuk Esterase: Trace / RBC: 25-50 /HPF / WBC 0-2   Sq Epi: x / Non Sq Epi: x / Bacteria: Few    Clostridium difficile Toxin by PCR (19 @ 20:40)    Clostridium difficile Toxin by PCR: The results of this test should be interpreted with consideration of all  clinical and laboratory findings. This test determines the presence of  the C. difficile tcdB gene at a given time and is not intended to  identify antibiotic associated disease or C. difficile infection without  clinical context.  Successful treatment is based on the resolution of clinical symptoms.  This test should not be used as a "test of cure" because C. difficile DNA  will persist after successful treatment. Repeat testing will not be  permitted.    This test is performed on the BD MAX system using Real-Time PCR and  fluorogenic target-specific hybridization.    C Diff by PCR Result: NotDetec          < from: CT Abdomen and Pelvis w/ Oral Cont and w/ IV Cont (19 @ 14:15) >    EXAM:  CT ABDOMEN AND PELVIS OC IC                            PROCEDURE DATE:  2019          INTERPRETATION:  DATE: 2019 2:15 PM  COMPARISON: None  CLINICAL INFORMATION: Diarrhea, abdominal distention  PROCEDURE:  CT of the Abdomen and Pelvis was performed withintravenous   contrast.  90 ml of Omnipaque 350 was injected intravenously. Oral   contrast was administered        FINDINGS:    LOWER CHEST: Coronary vascular calcification. Trace right pleural   effusion with minimal bibasilar atelectasis.    ABDOMEN AND PELVIS:    LIVER: within normal limits.  BILE DUCTS: Minimal biliary dilatation.  GALLBLADDER: Prior cholecystectomy.  PANCREAS: within normal limits.  SPLEEN: within normal limits.    ADRENALS: within normal limits.  KIDNEYS, URETERS AND BLADDER: Kidneys enhance bilaterally and   symmetrically. Bilateral chronic UPJ obstructions. Bilateral parapelvic   cysts. Exophytic anterior right upper pole renal cyst. Additional   subcentimeter hypodensities bilaterally to smallto characterize. No   intrarenal calculi. Ureters unremarkable. Marked thickening of the right   lateral and anterior bladder wall.    REPRODUCTIVE ORGANS: Extensive infiltrative changes in the pelvis and   presacral region with additional small ascites. Infiltrative changes   along the bilateral pelvic sidewall.    BOWEL: Pancolonic thickening with pericolonic infiltrative changes   compatible with pancolitis, differential including infectious versus   inflammatory etiology with consideration for C. Difficile infection.   Mildly prominent small bowel loops without evidence of obstruction.   Normal appendix. Thickening along the gastric antrum, may represent   gastritis however recommend direct visualization to exclude mucosal   abnormality.    PERITONEUM: no trace to small ascites. Extensive pelvic infiltrative   changes. No free air. No discrete drainable fluid collections.    VESSELS: Atherosclerotic changes .  RETROPERITONEUM: Within normal limits.      ABDOMINAL WALL: within normal limits.  BONES: Degenerative changes and osteopenia.    IMPRESSION:      Pancolonic thickening with pericolonic infiltrative changes compatible   with pancolitis, differential including infectious versus inflammatory   etiology with consideration for C. Difficile infection. Mildly prominent   small bowel loops without evidence of obstruction    Thickening along the gastric antrum, may represent gastritis however   recommend direct visualization to exclude mucosal abnormality.      Marked thickeningalong the right lateral and anterior bladder wall   recommend correlation with urinalysis as well as direct visualization to   exclude neoplasm.    Extensive infiltrate changes in the pelvic fat nonspecific in nature.        < end of copied text >          Radiology: all available radiological tests reviewed    Advanced directives addressed: full resuscitation

## 2019-05-22 NOTE — PROGRESS NOTE ADULT - SUBJECTIVE AND OBJECTIVE BOX
85yo/M with PMH CAD s/p CABG in , HTN, hyperlipidemia, OA s/p RT TKR, presented for evaluation of diarrhea. Patient states he had symptoms for almost a month, had been seen by GI as outpatient and had cdiff done about a week ago that was negative. He continues to have diarrhea now associated with urinary incontinence at time and dysuria. Patient was seen by DR Key as outpatient and supposed to have cystoscopy with biopsy last week but cancelled 2 to not feeling well with diarrhea and feeling weak. He denies chest pain or SOB. No fever or chills. Feels abdomen is distended and uncomfortable      19: Patient seen and examined. Feels much better today. Still with diarrhea. Discussed with patient and son at bed side regarding management plan.     Vital Signs Last 24 Hrs  T(C): 36.3 (22 May 2019 11:56), Max: 36.7 (21 May 2019 16:57)  T(F): 97.3 (22 May 2019 11:56), Max: 98.1 (21 May 2019 16:57)  HR: 64 (22 May 2019 12:00) (55 - 109)  BP: 119/- (22 May 2019 12:00) (105/36 - 139/58)  BP(mean): --  RR: 18 (22 May 2019 11:56) (18 - 18)  SpO2: 99% (22 May 2019 11:56) (97% - 100%)        Physical Exam:  · Constitutional	Well-developed, well nourished	  · ENMT	No oral lesions; no gross abnormalities	  · Back	No deformity or limitation of movement	  · Respiratory	Breath Sounds equal & clear to percussion & auscultation, no accessory muscle use	  · Cardiovascular	Regular rate & rhythm, normal S1, S2; no murmurs, gallops or rubs; no S3, S4	  · Gastrointestinal	detailed exam	  · GI Abnormal	distended	  · Gastrointestinal Comments	generalized sensitivity to palpation	  · Genitourinary	Normal genitalia; no lesions; no discharge	  · Extremities	No cyanosis, clubbing or edema	  · Neurological	Alert & oriented; no sensory, motor or coordination deficits, normal reflexes	  · Skin	detailed exam	  · Skin Comments	chronic venous stasis changes LE	  · Musculoskeletal	No joint pain, swelling or deformity; no limitation of movement	        Labs:                             11.4   5.48  )-----------( 168      ( 22 May 2019 08:26 )             36.2     22 May 2019 08:26    141    |  104    |  14     ----------------------------<  87     3.9     |  32     |  1.18     Ca    8.9        22 May 2019 08:26  Mg     1.8       21 May 2019 11:06    TPro  7.0    /  Alb  3.4    /  TBili  0.7    /  DBili  x      /  AST  18     /  ALT  15     /  AlkPhos  86     21 May 2019 11:06    LIVER FUNCTIONS - ( 21 May 2019 11:06 )  Alb: 3.4 g/dL / Pro: 7.0 gm/dL / ALK PHOS: 86 U/L / ALT: 15 U/L / AST: 18 U/L / GGT: x             CAPILLARY BLOOD GLUCOSE            Urinalysis Basic - ( 21 May 2019 16:52 )    Color: [place] / Appearance: Clear / S.010 / pH: x  Gluc: x / Ketone: Small  / Bili: Negative / Urobili: 1 mg/dL   Blood: x / Protein: 30 mg/dL / Nitrite: Negative   Leuk Esterase: Trace / RBC: 25-50 /HPF / WBC 0-2   Sq Epi: x / Non Sq Epi: x / Bacteria: Few              MEDICATIONS:    aspirin 325 milliGRAM(s) Oral daily  atorvastatin 20 milliGRAM(s) Oral at bedtime  ciprofloxacin   IVPB      ciprofloxacin   IVPB 400 milliGRAM(s) IV Intermittent every 12 hours  enalapril 20 milliGRAM(s) Oral daily  heparin  Injectable 5000 Unit(s) SubCutaneous every 8 hours  metoprolol succinate  milliGRAM(s) Oral daily  metroNIDAZOLE  IVPB      metroNIDAZOLE  IVPB 500 milliGRAM(s) IV Intermittent every 8 hours  multivitamin 1 Tablet(s) Oral daily    MEDICATIONS  (PRN):  acetaminophen   Tablet .. 650 milliGRAM(s) Oral every 6 hours PRN Temp greater or equal to 38C (100.4F), Mild Pain (1 - 3)  aluminum hydroxide/magnesium hydroxide/simethicone Suspension 30 milliLiter(s) Oral every 4 hours PRN Dyspepsia  ondansetron Injectable 4 milliGRAM(s) IV Push every 6 hours PRN Nausea          Assessment and Plan:   Assessment:  · Assessment		  #Pancolitis with extensive pelvic inflammation  GI eval DR Morse appreciated  Follow Gi PCR,   Cdiff negative  Continue IV Cipro+ Flagyl  Dr Grant consult appreciated    # H/O hematuria  Urology eval DR Key (pt supposed to have cystoscopy w bx)    #CAD s/p CABG  Continue aspirin, lipitor    #HTN  Stable  Continue lopressor and vesotec

## 2019-05-23 LAB
CULTURE RESULTS: NO GROWTH — SIGNIFICANT CHANGE UP
CULTURE RESULTS: SIGNIFICANT CHANGE UP
SPECIMEN SOURCE: SIGNIFICANT CHANGE UP
SPECIMEN SOURCE: SIGNIFICANT CHANGE UP

## 2019-05-23 PROCEDURE — 99232 SBSQ HOSP IP/OBS MODERATE 35: CPT

## 2019-05-23 RX ADMIN — HEPARIN SODIUM 5000 UNIT(S): 5000 INJECTION INTRAVENOUS; SUBCUTANEOUS at 05:20

## 2019-05-23 RX ADMIN — HEPARIN SODIUM 5000 UNIT(S): 5000 INJECTION INTRAVENOUS; SUBCUTANEOUS at 21:45

## 2019-05-23 RX ADMIN — Medication 100 MILLIGRAM(S): at 14:42

## 2019-05-23 RX ADMIN — Medication 100 MILLIGRAM(S): at 21:44

## 2019-05-23 RX ADMIN — Medication 200 MILLIGRAM(S): at 17:09

## 2019-05-23 RX ADMIN — Medication 200 MILLIGRAM(S): at 06:29

## 2019-05-23 RX ADMIN — Medication 325 MILLIGRAM(S): at 12:43

## 2019-05-23 RX ADMIN — Medication 100 MILLIGRAM(S): at 05:20

## 2019-05-23 RX ADMIN — ONDANSETRON 4 MILLIGRAM(S): 8 TABLET, FILM COATED ORAL at 21:48

## 2019-05-23 RX ADMIN — Medication 100 MILLIGRAM(S): at 05:19

## 2019-05-23 RX ADMIN — HEPARIN SODIUM 5000 UNIT(S): 5000 INJECTION INTRAVENOUS; SUBCUTANEOUS at 14:44

## 2019-05-23 RX ADMIN — ATORVASTATIN CALCIUM 20 MILLIGRAM(S): 80 TABLET, FILM COATED ORAL at 21:45

## 2019-05-23 RX ADMIN — Medication 1 TABLET(S): at 12:43

## 2019-05-23 RX ADMIN — Medication 20 MILLIGRAM(S): at 05:21

## 2019-05-23 NOTE — PROGRESS NOTE ADULT - SUBJECTIVE AND OBJECTIVE BOX
Patient is a 86y old  Male who presents with a chief complaint of diarrhea (22 May 2019 07:38)    Date of service: 05-23-19 @ 12:33      Patient had loose stool this am; none overnite  Afebrile      ROS unable to obtain secondary to patient medical condition     MEDICATIONS  (STANDING):  aspirin 325 milliGRAM(s) Oral daily  atorvastatin 20 milliGRAM(s) Oral at bedtime  ciprofloxacin   IVPB      ciprofloxacin   IVPB 400 milliGRAM(s) IV Intermittent every 12 hours  enalapril 20 milliGRAM(s) Oral daily  heparin  Injectable 5000 Unit(s) SubCutaneous every 8 hours  metoprolol succinate  milliGRAM(s) Oral daily  metroNIDAZOLE  IVPB      metroNIDAZOLE  IVPB 500 milliGRAM(s) IV Intermittent every 8 hours  multivitamin 1 Tablet(s) Oral daily    MEDICATIONS  (PRN):  acetaminophen   Tablet .. 650 milliGRAM(s) Oral every 6 hours PRN Temp greater or equal to 38C (100.4F), Mild Pain (1 - 3)  aluminum hydroxide/magnesium hydroxide/simethicone Suspension 30 milliLiter(s) Oral every 4 hours PRN Dyspepsia  ondansetron Injectable 4 milliGRAM(s) IV Push every 6 hours PRN Nausea      Vital Signs Last 24 Hrs  T(C): 36.6 (23 May 2019 05:24), Max: 36.8 (22 May 2019 16:34)  T(F): 97.9 (23 May 2019 05:24), Max: 98.2 (22 May 2019 16:34)  HR: 58 (23 May 2019 05:24) (57 - 58)  BP: 128/62 (23 May 2019 05:24) (110/49 - 128/62)  BP(mean): --  RR: 18 (23 May 2019 05:24) (18 - 18)  SpO2: 96% (23 May 2019 05:24) (96% - 96%)    Physical Exam:        PE:    Constitutional: frail looking  HEENT: NC/AT, EOMI, PERRLA, conjunctivae clear; ears and nose atraumatic; pharynx clear  Neck: supple; thyroid not palpable  Back: no tenderness  Respiratory: respiratory effort normal; clear to auscultation  Cardiovascular: S1S2 regular, no murmurs  Abdomen: soft, not tender, not distended, positive BS; no liver or spleen organomegaly  Genitourinary: no suprapubic tenderness  Musculoskeletal: no muscle tenderness, no joint swelling or tenderness  Neurological/ Psychiatric: AxOx3, judgement and insight normal;  moving all extremities  Skin: no rashes; no palpable lesions    Labs: all available labs reviewed                        Labs:                        11.4   5.48  )-----------( 168      ( 22 May 2019 08:26 )             36.2     05-22    141  |  104  |  14  ----------------------------<  87  3.9   |  32<H>  |  1.18    Ca    8.9      22 May 2019 08:26             Cultures:       Culture - Blood (collected 05-21-19 @ 17:26)  Source: .Blood None  Preliminary Report (05-23-19 @ 01:02):    No growth to date.    Culture - Blood (collected 05-21-19 @ 17:26)  Source: .Blood None  Preliminary Report (05-23-19 @ 01:02):    No growth to date.    Culture - Urine (collected 05-21-19 @ 16:52)  Source: .Urine Clean Catch (Midstream)  Final Report (05-23-19 @ 00:44):    No growth                  Clostridium difficile Toxin by PCR (05.21.19 @ 20:40)    Clostridium difficile Toxin by PCR: The results of this test should be interpreted with consideration of all  clinical and laboratory findings. This test determines the presence of  the C. difficile tcdB gene at a given time and is not intended to  identify antibiotic associated disease or C. difficile infection without  clinical context.  Successful treatment is based on the resolution of clinical symptoms.  This test should not be used as a "test of cure" because C. difficile DNA  will persist after successful treatment. Repeat testing will not be  permitted.    This test is performed on the BD MAX system using Real-Time PCR and  fluorogenic target-specific hybridization.    C Diff by PCR Result: NotDetec          < from: CT Abdomen and Pelvis w/ Oral Cont and w/ IV Cont (05.21.19 @ 14:15) >    EXAM:  CT ABDOMEN AND PELVIS OC IC                            PROCEDURE DATE:  05/21/2019          INTERPRETATION:  DATE: 5/21/2019 2:15 PM  COMPARISON: None  CLINICAL INFORMATION: Diarrhea, abdominal distention  PROCEDURE:  CT of the Abdomen and Pelvis was performed withintravenous   contrast.  90 ml of Omnipaque 350 was injected intravenously. Oral   contrast was administered        FINDINGS:    LOWER CHEST: Coronary vascular calcification. Trace right pleural   effusion with minimal bibasilar atelectasis.    ABDOMEN AND PELVIS:    LIVER: within normal limits.  BILE DUCTS: Minimal biliary dilatation.  GALLBLADDER: Prior cholecystectomy.  PANCREAS: within normal limits.  SPLEEN: within normal limits.    ADRENALS: within normal limits.  KIDNEYS, URETERS AND BLADDER: Kidneys enhance bilaterally and   symmetrically. Bilateral chronic UPJ obstructions. Bilateral parapelvic   cysts. Exophytic anterior right upper pole renal cyst. Additional   subcentimeter hypodensities bilaterally to smallto characterize. No   intrarenal calculi. Ureters unremarkable. Marked thickening of the right   lateral and anterior bladder wall.    REPRODUCTIVE ORGANS: Extensive infiltrative changes in the pelvis and   presacral region with additional small ascites. Infiltrative changes   along the bilateral pelvic sidewall.    BOWEL: Pancolonic thickening with pericolonic infiltrative changes   compatible with pancolitis, differential including infectious versus   inflammatory etiology with consideration for C. Difficile infection.   Mildly prominent small bowel loops without evidence of obstruction.   Normal appendix. Thickening along the gastric antrum, may represent   gastritis however recommend direct visualization to exclude mucosal   abnormality.    PERITONEUM: no trace to small ascites. Extensive pelvic infiltrative   changes. No free air. No discrete drainable fluid collections.    VESSELS: Atherosclerotic changes .  RETROPERITONEUM: Within normal limits.      ABDOMINAL WALL: within normal limits.  BONES: Degenerative changes and osteopenia.    IMPRESSION:      Pancolonic thickening with pericolonic infiltrative changes compatible   with pancolitis, differential including infectious versus inflammatory   etiology with consideration for C. Difficile infection. Mildly prominent   small bowel loops without evidence of obstruction    Thickening along the gastric antrum, may represent gastritis however   recommend direct visualization to exclude mucosal abnormality.      Marked thickeningalong the right lateral and anterior bladder wall   recommend correlation with urinalysis as well as direct visualization to   exclude neoplasm.    Extensive infiltrate changes in the pelvic fat nonspecific in nature.        < end of copied text >          Radiology: all available radiological tests reviewed    Advanced directives addressed: full resuscitation

## 2019-05-23 NOTE — PROGRESS NOTE ADULT - ASSESSMENT
87yo male with pancolitis of unclear etiology  cdiff negative, await gi pcr  significant clinical improvement on abx so will continue

## 2019-05-23 NOTE — PROGRESS NOTE ADULT - ASSESSMENT
85yo/M with PMH CAD s/p CABG in 2005, HTN, hyperlipidemia, OA s/p RT TKR, admitted on 5/21 for evaluation of 4 weeks diarrhea that started as pasty stool but progressed to liquid watery stool. No prior antibiotics, no recent travel or food ingestion. No sick contacts and notes improvement in diarrhea since starting antibiotics. No abdominal pain and no fever. No nausea or vomiting.  1. Patient admitted with diarrhea, found to have pan colitis, infectious versus inflammatory  - follow up cultures   - iv hydration and supportive care   - oxygen and nebs as needed   - serial cbc and monitor temperature   - reviewed prior medical records to evaluate for resistant or atypical pathogens   - agree with flagyl and cipro as ordered, given that patient feels markedly better, day #2  - tolerating antibiotics without rashes or side effects   - GI PCR pending  - urology evaluation may be a consideration given imaging results  2. other issues; per medicine

## 2019-05-23 NOTE — PROGRESS NOTE ADULT - SUBJECTIVE AND OBJECTIVE BOX
Patient is a 86y old  Male who presents with a chief complaint of diarrhea (22 May 2019 19:13)      HPI:  pt still with some diarrhea but frequency much improved compared to prior to admission  no abdominal pain and bloating also improved    PAST MEDICAL & SURGICAL HISTORY:  Osteoarthrosis  Hyperlipidemia  Hypertension  CAD (coronary artery disease)  H/O total knee replacement, right  History of cholecystectomy  S/P CABG x 5    MEDICATIONS  (STANDING):  aspirin 325 milliGRAM(s) Oral daily  atorvastatin 20 milliGRAM(s) Oral at bedtime  ciprofloxacin   IVPB      ciprofloxacin   IVPB 400 milliGRAM(s) IV Intermittent every 12 hours  enalapril 20 milliGRAM(s) Oral daily  heparin  Injectable 5000 Unit(s) SubCutaneous every 8 hours  metoprolol succinate  milliGRAM(s) Oral daily  metroNIDAZOLE  IVPB      metroNIDAZOLE  IVPB 500 milliGRAM(s) IV Intermittent every 8 hours  multivitamin 1 Tablet(s) Oral daily    MEDICATIONS  (PRN):  acetaminophen   Tablet .. 650 milliGRAM(s) Oral every 6 hours PRN Temp greater or equal to 38C (100.4F), Mild Pain (1 - 3)  aluminum hydroxide/magnesium hydroxide/simethicone Suspension 30 milliLiter(s) Oral every 4 hours PRN Dyspepsia  ondansetron Injectable 4 milliGRAM(s) IV Push every 6 hours PRN Nausea    Allergies  No Known Allergies    REVIEW OF SYSTEMS:    CONSTITUTIONAL: No weakness, fevers or chills  RESPIRATORY: No cough, wheezing, hemoptysis; No shortness of breath  CARDIOVASCULAR: No chest pain or palpitations  GASTROINTESTINAL: as above  All other review of systems is negative unless indicated above.    Vital Signs Last 24 Hrs  T(C): 36.6 (23 May 2019 05:24), Max: 36.8 (22 May 2019 16:34)  T(F): 97.9 (23 May 2019 05:24), Max: 98.2 (22 May 2019 16:34)  HR: 58 (23 May 2019 05:24) (55 - 64)  BP: 128/62 (23 May 2019 05:24) (105/36 - 128/62)  BP(mean): --  RR: 18 (23 May 2019 05:24) (18 - 18)  SpO2: 96% (23 May 2019 05:24) (96% - 99%)    PHYSICAL EXAM:    Constitutional: NAD  Respiratory: CTAB  Cardiovascular: S1 and S2  Gastrointestinal: BS+, soft, mild distention without focal tenderness      LABS:                        11.4   5.48  )-----------( 168      ( 22 May 2019 08:26 )             36.2     05-22    141  |  104  |  14  ----------------------------<  87  3.9   |  32<H>  |  1.18    Ca    8.9      22 May 2019 08:26  Mg     1.8     05-21    TPro  7.0  /  Alb  3.4  /  TBili  0.7  /  DBili  x   /  AST  18  /  ALT  15  /  AlkPhos  86  05-21      LIVER FUNCTIONS - ( 21 May 2019 11:06 )  Alb: 3.4 g/dL / Pro: 7.0 gm/dL / ALK PHOS: 86 U/L / ALT: 15 U/L / AST: 18 U/L / GGT: x             RADIOLOGY & ADDITIONAL STUDIES:

## 2019-05-23 NOTE — PROGRESS NOTE ADULT - SUBJECTIVE AND OBJECTIVE BOX
87yo/M with PMH CAD s/p CABG in , HTN, hyperlipidemia, OA s/p RT TKR, presented for evaluation of diarrhea. Patient states he had symptoms for almost a month, had been seen by GI as outpatient and had cdiff done about a week ago that was negative. He continues to have diarrhea now associated with urinary incontinence at time and dysuria. Patient was seen by DR Key as outpatient and supposed to have cystoscopy with biopsy last week but cancelled 2 to not feeling well with diarrhea and feeling weak. He denies chest pain or SOB. No fever or chills. Feels abdomen is distended and uncomfortable      19: Patient seen and examined. Feels much better today. Still with diarrhea. Discussed with patient and son at bed side regarding management plan.   19: Patient seen and examined. Diarrhea improving. Appetite improved.     Vital Signs Last 24 Hrs  T(C): 36.6 (23 May 2019 05:24), Max: 36.8 (22 May 2019 16:34)  T(F): 97.9 (23 May 2019 05:24), Max: 98.2 (22 May 2019 16:34)  HR: 58 (23 May 2019 05:24) (57 - 58)  BP: 128/62 (23 May 2019 05:24) (110/49 - 128/62)  BP(mean): --  RR: 18 (23 May 2019 05:24) (18 - 18)  SpO2: 96% (23 May 2019 05:24) (96% - 96%)        Physical Exam:  · Constitutional	Well-developed, well nourished	  · ENMT	No oral lesions; no gross abnormalities	  · Back	No deformity or limitation of movement	  · Respiratory	Breath Sounds equal & clear to percussion & auscultation, no accessory muscle use	  · Cardiovascular	Regular rate & rhythm, normal S1, S2; no murmurs, gallops or rubs; no S3, S4	  · Gastrointestinal	detailed exam	  · GI Abnormal	distended	  · Gastrointestinal Comments	generalized sensitivity to palpation	  · Genitourinary	Normal genitalia; no lesions; no discharge	  · Extremities	No cyanosis, clubbing or edema	  · Neurological	Alert & oriented; no sensory, motor or coordination deficits, normal reflexes	  · Skin	detailed exam	  · Skin Comments	chronic venous stasis changes LE	  · Musculoskeletal	No joint pain, swelling or deformity; no limitation of movement	        Labs:                             11.4   5.48  )-----------( 168      ( 22 May 2019 08:26 )             36.2     22 May 2019 08:26    141    |  104    |  14     ----------------------------<  87     3.9     |  32     |  1.18     Ca    8.9        22 May 2019 08:26  Mg     1.8       21 May 2019 11:06    TPro  7.0    /  Alb  3.4    /  TBili  0.7    /  DBili  x      /  AST  18     /  ALT  15     /  AlkPhos  86     21 May 2019 11:06    LIVER FUNCTIONS - ( 21 May 2019 11:06 )  Alb: 3.4 g/dL / Pro: 7.0 gm/dL / ALK PHOS: 86 U/L / ALT: 15 U/L / AST: 18 U/L / GGT: x             CAPILLARY BLOOD GLUCOSE            Urinalysis Basic - ( 21 May 2019 16:52 )    Color: [place] / Appearance: Clear / S.010 / pH: x  Gluc: x / Ketone: Small  / Bili: Negative / Urobili: 1 mg/dL   Blood: x / Protein: 30 mg/dL / Nitrite: Negative   Leuk Esterase: Trace / RBC: 25-50 /HPF / WBC 0-2   Sq Epi: x / Non Sq Epi: x / Bacteria: Few              MEDICATIONS:    aspirin 325 milliGRAM(s) Oral daily  atorvastatin 20 milliGRAM(s) Oral at bedtime  ciprofloxacin   IVPB      ciprofloxacin   IVPB 400 milliGRAM(s) IV Intermittent every 12 hours  enalapril 20 milliGRAM(s) Oral daily  heparin  Injectable 5000 Unit(s) SubCutaneous every 8 hours  metoprolol succinate  milliGRAM(s) Oral daily  metroNIDAZOLE  IVPB      metroNIDAZOLE  IVPB 500 milliGRAM(s) IV Intermittent every 8 hours  multivitamin 1 Tablet(s) Oral daily    MEDICATIONS  (PRN):  acetaminophen   Tablet .. 650 milliGRAM(s) Oral every 6 hours PRN Temp greater or equal to 38C (100.4F), Mild Pain (1 - 3)  aluminum hydroxide/magnesium hydroxide/simethicone Suspension 30 milliLiter(s) Oral every 4 hours PRN Dyspepsia  ondansetron Injectable 4 milliGRAM(s) IV Push every 6 hours PRN Nausea          Assessment and Plan:   Assessment:  · Assessment		  #Pancolitis with extensive pelvic inflammation  Dr Mackenzie follow up appreciated  Follow Gi PCR,   Cdiff negative  Continue IV Cipro+ Flagyl  Dr Grant consult appreciated    # H/O hematuria   eval appreciated  Further work up as an outpatient with Dr Briseno    #CAD s/p CABG  Continue aspirin, lipitor    #HTN  Stable  Continue lopressor and vesotec    # DVT prophylaxis-on heparin

## 2019-05-24 LAB
ANION GAP SERPL CALC-SCNC: 9 MMOL/L — SIGNIFICANT CHANGE UP (ref 5–17)
BUN SERPL-MCNC: 15 MG/DL — SIGNIFICANT CHANGE UP (ref 7–23)
CALCIUM SERPL-MCNC: 9 MG/DL — SIGNIFICANT CHANGE UP (ref 8.5–10.1)
CHLORIDE SERPL-SCNC: 102 MMOL/L — SIGNIFICANT CHANGE UP (ref 96–108)
CO2 SERPL-SCNC: 30 MMOL/L — SIGNIFICANT CHANGE UP (ref 22–31)
CREAT SERPL-MCNC: 1.26 MG/DL — SIGNIFICANT CHANGE UP (ref 0.5–1.3)
GLUCOSE SERPL-MCNC: 145 MG/DL — HIGH (ref 70–99)
HCT VFR BLD CALC: 40.3 % — SIGNIFICANT CHANGE UP (ref 39–50)
HGB BLD-MCNC: 12.7 G/DL — LOW (ref 13–17)
MCHC RBC-ENTMCNC: 28.3 PG — SIGNIFICANT CHANGE UP (ref 27–34)
MCHC RBC-ENTMCNC: 31.5 GM/DL — LOW (ref 32–36)
MCV RBC AUTO: 89.8 FL — SIGNIFICANT CHANGE UP (ref 80–100)
PLATELET # BLD AUTO: 217 K/UL — SIGNIFICANT CHANGE UP (ref 150–400)
POTASSIUM SERPL-MCNC: 3.3 MMOL/L — LOW (ref 3.5–5.3)
POTASSIUM SERPL-SCNC: 3.3 MMOL/L — LOW (ref 3.5–5.3)
RBC # BLD: 4.49 M/UL — SIGNIFICANT CHANGE UP (ref 4.2–5.8)
RBC # FLD: 13.3 % — SIGNIFICANT CHANGE UP (ref 10.3–14.5)
SODIUM SERPL-SCNC: 141 MMOL/L — SIGNIFICANT CHANGE UP (ref 135–145)
WBC # BLD: 7.54 K/UL — SIGNIFICANT CHANGE UP (ref 3.8–10.5)
WBC # FLD AUTO: 7.54 K/UL — SIGNIFICANT CHANGE UP (ref 3.8–10.5)

## 2019-05-24 PROCEDURE — 74177 CT ABD & PELVIS W/CONTRAST: CPT | Mod: 26

## 2019-05-24 PROCEDURE — 99232 SBSQ HOSP IP/OBS MODERATE 35: CPT

## 2019-05-24 RX ORDER — PIPERACILLIN AND TAZOBACTAM 4; .5 G/20ML; G/20ML
3.38 INJECTION, POWDER, LYOPHILIZED, FOR SOLUTION INTRAVENOUS EVERY 8 HOURS
Refills: 0 | Status: DISCONTINUED | OUTPATIENT
Start: 2019-05-24 | End: 2019-06-04

## 2019-05-24 RX ORDER — LANOLIN ALCOHOL/MO/W.PET/CERES
5 CREAM (GRAM) TOPICAL AT BEDTIME
Refills: 0 | Status: DISCONTINUED | OUTPATIENT
Start: 2019-05-24 | End: 2019-05-29

## 2019-05-24 RX ORDER — POTASSIUM CHLORIDE 20 MEQ
40 PACKET (EA) ORAL ONCE
Refills: 0 | Status: COMPLETED | OUTPATIENT
Start: 2019-05-24 | End: 2019-05-24

## 2019-05-24 RX ADMIN — Medication 5 MILLIGRAM(S): at 22:27

## 2019-05-24 RX ADMIN — HEPARIN SODIUM 5000 UNIT(S): 5000 INJECTION INTRAVENOUS; SUBCUTANEOUS at 04:55

## 2019-05-24 RX ADMIN — ONDANSETRON 4 MILLIGRAM(S): 8 TABLET, FILM COATED ORAL at 04:57

## 2019-05-24 RX ADMIN — Medication 40 MILLIEQUIVALENT(S): at 11:51

## 2019-05-24 RX ADMIN — PIPERACILLIN AND TAZOBACTAM 25 GRAM(S): 4; .5 INJECTION, POWDER, LYOPHILIZED, FOR SOLUTION INTRAVENOUS at 14:20

## 2019-05-24 RX ADMIN — Medication 100 MILLIGRAM(S): at 05:00

## 2019-05-24 RX ADMIN — Medication 20 MILLIGRAM(S): at 04:55

## 2019-05-24 RX ADMIN — HEPARIN SODIUM 5000 UNIT(S): 5000 INJECTION INTRAVENOUS; SUBCUTANEOUS at 22:25

## 2019-05-24 RX ADMIN — HEPARIN SODIUM 5000 UNIT(S): 5000 INJECTION INTRAVENOUS; SUBCUTANEOUS at 14:20

## 2019-05-24 RX ADMIN — Medication 100 MILLIGRAM(S): at 04:56

## 2019-05-24 RX ADMIN — Medication 200 MILLIGRAM(S): at 06:04

## 2019-05-24 RX ADMIN — Medication 1 TABLET(S): at 11:51

## 2019-05-24 RX ADMIN — ATORVASTATIN CALCIUM 20 MILLIGRAM(S): 80 TABLET, FILM COATED ORAL at 22:25

## 2019-05-24 RX ADMIN — Medication 325 MILLIGRAM(S): at 11:51

## 2019-05-24 RX ADMIN — PIPERACILLIN AND TAZOBACTAM 25 GRAM(S): 4; .5 INJECTION, POWDER, LYOPHILIZED, FOR SOLUTION INTRAVENOUS at 22:25

## 2019-05-24 NOTE — PROGRESS NOTE ADULT - SUBJECTIVE AND OBJECTIVE BOX
Patient is a 86y old  Male who presents with a chief complaint of diarrhea (23 May 2019 12:32)      HPI:  pt had bad day and night  during the day yesterday, pt developed increasing loose stool and abdominal distention  he had tough night and didnt sleep well  some emesis this AM    PAST MEDICAL & SURGICAL HISTORY:  Osteoarthrosis  Hyperlipidemia  Hypertension  CAD (coronary artery disease)  H/O total knee replacement, right  History of cholecystectomy  S/P CABG x 5    MEDICATIONS  (STANDING):  aspirin 325 milliGRAM(s) Oral daily  atorvastatin 20 milliGRAM(s) Oral at bedtime  ciprofloxacin   IVPB      ciprofloxacin   IVPB 400 milliGRAM(s) IV Intermittent every 12 hours  enalapril 20 milliGRAM(s) Oral daily  heparin  Injectable 5000 Unit(s) SubCutaneous every 8 hours  metoprolol succinate  milliGRAM(s) Oral daily  metroNIDAZOLE  IVPB      metroNIDAZOLE  IVPB 500 milliGRAM(s) IV Intermittent every 8 hours  multivitamin 1 Tablet(s) Oral daily    MEDICATIONS  (PRN):  acetaminophen   Tablet .. 650 milliGRAM(s) Oral every 6 hours PRN Temp greater or equal to 38C (100.4F), Mild Pain (1 - 3)  aluminum hydroxide/magnesium hydroxide/simethicone Suspension 30 milliLiter(s) Oral every 4 hours PRN Dyspepsia  ondansetron Injectable 4 milliGRAM(s) IV Push every 6 hours PRN Nausea    Allergies  No Known Allergies    REVIEW OF SYSTEMS:  CONSTITUTIONAL: tied and weak this morning  RESPIRATORY: No cough, wheezing, hemoptysis; No shortness of breath  CARDIOVASCULAR: No chest pain or palpitations  GASTROINTESTINAL: as above  All other review of systems is negative unless indicated above.    Vital Signs Last 24 Hrs  T(C): 36.1 (24 May 2019 04:21), Max: 36.9 (23 May 2019 16:47)  T(F): 97 (24 May 2019 04:21), Max: 98.5 (23 May 2019 16:47)  HR: 97 (24 May 2019 04:21) (61 - 97)  BP: 110/62 (24 May 2019 04:21) (110/62 - 127/65)  BP(mean): --  RR: 19 (24 May 2019 04:21) (18 - 19)  SpO2: 96% (24 May 2019 04:21) (95% - 98%)    PHYSICAL EXAM:    Constitutional: appears more ill today  Respiratory: CTA  Cardiovascular: S1 and S2  Gastrointestinal: BS+, significant increased distention, no focal tenderness    LABS:                        12.7   7.54  )-----------( 217      ( 24 May 2019 06:23 )             40.3     05-24    141  |  102  |  15  ----------------------------<  145<H>  3.3<L>   |  30  |  1.26    Ca    9.0      24 May 2019 06:23            RADIOLOGY & ADDITIONAL STUDIES:

## 2019-05-24 NOTE — PROGRESS NOTE ADULT - ASSESSMENT
87yo male with pancolitis  stool pcr and cdiff negative  ?initial presentation of inflammatory bowel disease  he had been doing very well since starting IV abx but now getting worse  will repeat ct scan - ?worsening colitis with ?ileus

## 2019-05-24 NOTE — PROGRESS NOTE ADULT - SUBJECTIVE AND OBJECTIVE BOX
cc: Diarrhea  · Subjective and Objective: 	  85yo/M with PMH CAD s/p CABG in 2005, HTN, hyperlipidemia, OA s/p RT TKR, presented for evaluation of diarrhea. Patient states he had symptoms for almost a month, had been seen by GI as outpatient and had cdiff done about a week ago that was negative. He continues to have diarrhea now associated with urinary incontinence at time and dysuria. Patient was seen by DR Key as outpatient and supposed to have cystoscopy with biopsy last week but cancelled 2 to not feeling well with diarrhea and feeling weak. He denies chest pain or SOB. No fever or chills. Feels abdomen is distended and uncomfortable      05/22/19: Patient seen and examined. Feels much better today. Still with diarrhea. Discussed with patient and son at bed side regarding management plan.   05/23/19: Patient seen and examined. Diarrhea improving. Appetite improved.   5/24: Pt with multiple loose BMS daily, also with distended abd.  Pt had episode of N/V after drinking contrast for CT.  No fever, chills, n, v.    Vital Signs Last 24 Hrs  T(C): 36.5 (24 May 2019 11:26), Max: 36.9 (23 May 2019 16:47)  T(F): 97.7 (24 May 2019 11:26), Max: 98.5 (23 May 2019 16:47)  HR: 72 (24 May 2019 11:26) (61 - 97)  BP: 106/63 (24 May 2019 11:26) (106/63 - 127/65)  BP(mean): --  RR: 18 (24 May 2019 11:26) (18 - 19)  SpO2: 97% (24 May 2019 11:26) (95% - 98%)    PHYSICAL EXAM:    Constitutional: NAD, awake and alert, well-developed  HEENT: PERR, EOMI, Normal Hearing, MMM  Neck: Soft and supple  Respiratory: Breath sounds are clear bilaterally, No wheezing, rales or rhonchi  Cardiovascular: S1 and S2, regular rate and rhythm, no Murmurs, gallops or rubs  Gastrointestinal: Bowel Sounds present, soft, nontender, distended, no guarding, no rebound  Extremities: No peripheral edema  Neurological: A/O x 3, no focal deficits in my limited exam  Skin: No rashes        MEDICATIONS  (STANDING):  aspirin 325 milliGRAM(s) Oral daily  atorvastatin 20 milliGRAM(s) Oral at bedtime  enalapril 20 milliGRAM(s) Oral daily  heparin  Injectable 5000 Unit(s) SubCutaneous every 8 hours  metoprolol succinate  milliGRAM(s) Oral daily  multivitamin 1 Tablet(s) Oral daily  piperacillin/tazobactam IVPB. 3.375 Gram(s) IV Intermittent every 8 hours    MEDICATIONS  (PRN):  acetaminophen   Tablet .. 650 milliGRAM(s) Oral every 6 hours PRN Temp greater or equal to 38C (100.4F), Mild Pain (1 - 3)  aluminum hydroxide/magnesium hydroxide/simethicone Suspension 30 milliLiter(s) Oral every 4 hours PRN Dyspepsia  ondansetron Injectable 4 milliGRAM(s) IV Push every 6 hours PRN Nausea                              12.7   7.54  )-----------( 217      ( 24 May 2019 06:23 )             40.3     05-24    141  |  102  |  15  ----------------------------<  145<H>  3.3<L>   |  30  |  1.26    Ca    9.0      24 May 2019 06:23      CAPILLARY BLOOD GLUCOSE    Assessment and Plan:      Diarrhea/abd distention: due to Pancolitis with extensive pelvic inflammation: Infectious vs inflammatory  -cdiff negative  -GI PCR negative  -antibiotics changed from flagyl/cipro to zosyn due to nausea per ID  -repeat CT imaging still with extensive inflammation, no obstruction   -AM labs w/ inflammatory markers  -f/u GI    # H/O hematuria  -CT shows b/l hydroureternephrosis with thickened bladder  -Pt will need outpatient cystoscopy with biopsy w/ Dr. Briseno    #CAD s/p CABG  Continue aspirin, lipitor    Anemia: Stable  -monitor    #HTN  Stable  Continue lopressor and Vasotec    # DVT prophylaxis-on heparin                                                Electronic Signatures:  Yareli Johnson)  (Signed 23-May-2019 12:24)  	Authored: Progress Note, Reason for Admission, Subjective and Objective      Last Updated: 23-May-2019 12:24 by Yareli Johnson)

## 2019-05-24 NOTE — PROGRESS NOTE ADULT - SUBJECTIVE AND OBJECTIVE BOX
Patient is a 86y old  Male who presents with a chief complaint of diarrhea (22 May 2019 07:38)    Date of service: 05-24-19 @ 11:00        Patient had nausea and vomiting earlier; has abdominal distention    ROS: no fever or chills; denies dizziness, no HA, no SOB or cough, no abdominal pain, no diarrhea or constipation; no dysuria, no urinary frequency, no legs pain, no rashes    MEDICATIONS  (STANDING):  aspirin 325 milliGRAM(s) Oral daily  atorvastatin 20 milliGRAM(s) Oral at bedtime  enalapril 20 milliGRAM(s) Oral daily  heparin  Injectable 5000 Unit(s) SubCutaneous every 8 hours  metoprolol succinate  milliGRAM(s) Oral daily  multivitamin 1 Tablet(s) Oral daily  piperacillin/tazobactam IVPB. 3.375 Gram(s) IV Intermittent every 8 hours  potassium chloride    Tablet ER 40 milliEquivalent(s) Oral once    MEDICATIONS  (PRN):  acetaminophen   Tablet .. 650 milliGRAM(s) Oral every 6 hours PRN Temp greater or equal to 38C (100.4F), Mild Pain (1 - 3)  aluminum hydroxide/magnesium hydroxide/simethicone Suspension 30 milliLiter(s) Oral every 4 hours PRN Dyspepsia  ondansetron Injectable 4 milliGRAM(s) IV Push every 6 hours PRN Nausea      Vital Signs Last 24 Hrs  T(C): 36.1 (24 May 2019 04:21), Max: 36.9 (23 May 2019 16:47)  T(F): 97 (24 May 2019 04:21), Max: 98.5 (23 May 2019 16:47)  HR: 97 (24 May 2019 04:21) (61 - 97)  BP: 110/62 (24 May 2019 04:21) (110/62 - 127/65)  BP(mean): --  RR: 19 (24 May 2019 04:21) (18 - 19)  SpO2: 96% (24 May 2019 04:21) (95% - 98%)    Physical Exam:        PE:    Constitutional: frail looking  HEENT: NC/AT, EOMI, PERRLA, conjunctivae clear; ears and nose atraumatic; pharynx clear  Neck: supple; thyroid not palpable  Back: no tenderness  Respiratory: respiratory effort normal; clear to auscultation  Cardiovascular: S1S2 regular, no murmurs  Abdomen: soft, not tender, not distended, positive BS; no liver or spleen organomegaly  Genitourinary: no suprapubic tenderness  Musculoskeletal: no muscle tenderness, no joint swelling or tenderness  Neurological/ Psychiatric: AxOx3, judgement and insight normal;  moving all extremities  Skin: no rashes; no palpable lesions    Labs: all available labs reviewed                         Labs:                        12.7   7.54  )-----------( 217      ( 24 May 2019 06:23 )             40.3     05-24    141  |  102  |  15  ----------------------------<  145<H>  3.3<L>   |  30  |  1.26    Ca    9.0      24 May 2019 06:23             Cultures:       GI PCR Panel, Stool (collected 05-23-19 @ 08:20)  Source: .Stool Feces palomo salgado  Final Report (05-23-19 @ 14:02):    GI PCR Results: NOT detected    *******Please Note:*******    GI panel PCR evaluates for:    Campylobacter, Plesiomonas shigelloides, Salmonella,    Vibrio, Yersinia enterocolitica, Enteroaggregative    Escherichia coli (EAEC), Enteropathogenic E.coli (EPEC),    Enterotoxigenic E. coli (ETEC) lt/st, Shiga-like    toxin-producing E. coli (STEC) stx1/stx2,    Shigella/ Enteroinvasive E. coli (EIEC), Cryptosporidium,    Cyclospora cayetanensis, Entamoeba histolytica,    Giardia lamblia, Adenovirus F 40/41, Astrovirus,    Norovirus GI/GII, Rotavirus A, Sapovirus    Culture - Blood (collected 05-21-19 @ 17:26)  Source: .Blood None  Preliminary Report (05-23-19 @ 01:02):    No growth to date.    Culture - Blood (collected 05-21-19 @ 17:26)  Source: .Blood None  Preliminary Report (05-23-19 @ 01:02):    No growth to date.    Culture - Urine (collected 05-21-19 @ 16:52)  Source: .Urine Clean Catch (Midstream)  Final Report (05-23-19 @ 00:44):    No growth                    Clostridium difficile Toxin by PCR (05.21.19 @ 20:40)    Clostridium difficile Toxin by PCR: The results of this test should be interpreted with consideration of all  clinical and laboratory findings. This test determines the presence of  the C. difficile tcdB gene at a given time and is not intended to  identify antibiotic associated disease or C. difficile infection without  clinical context.  Successful treatment is based on the resolution of clinical symptoms.  This test should not be used as a "test of cure" because C. difficile DNA  will persist after successful treatment. Repeat testing will not be  permitted.    This test is performed on the BD MAX system using Real-Time PCR and  fluorogenic target-specific hybridization.    C Diff by PCR Result: NotDetec          < from: CT Abdomen and Pelvis w/ Oral Cont and w/ IV Cont (05.21.19 @ 14:15) >    EXAM:  CT ABDOMEN AND PELVIS OC IC                            PROCEDURE DATE:  05/21/2019          INTERPRETATION:  DATE: 5/21/2019 2:15 PM  COMPARISON: None  CLINICAL INFORMATION: Diarrhea, abdominal distention  PROCEDURE:  CT of the Abdomen and Pelvis was performed withintravenous   contrast.  90 ml of Omnipaque 350 was injected intravenously. Oral   contrast was administered        FINDINGS:    LOWER CHEST: Coronary vascular calcification. Trace right pleural   effusion with minimal bibasilar atelectasis.    ABDOMEN AND PELVIS:    LIVER: within normal limits.  BILE DUCTS: Minimal biliary dilatation.  GALLBLADDER: Prior cholecystectomy.  PANCREAS: within normal limits.  SPLEEN: within normal limits.    ADRENALS: within normal limits.  KIDNEYS, URETERS AND BLADDER: Kidneys enhance bilaterally and   symmetrically. Bilateral chronic UPJ obstructions. Bilateral parapelvic   cysts. Exophytic anterior right upper pole renal cyst. Additional   subcentimeter hypodensities bilaterally to smallto characterize. No   intrarenal calculi. Ureters unremarkable. Marked thickening of the right   lateral and anterior bladder wall.    REPRODUCTIVE ORGANS: Extensive infiltrative changes in the pelvis and   presacral region with additional small ascites. Infiltrative changes   along the bilateral pelvic sidewall.    BOWEL: Pancolonic thickening with pericolonic infiltrative changes   compatible with pancolitis, differential including infectious versus   inflammatory etiology with consideration for C. Difficile infection.   Mildly prominent small bowel loops without evidence of obstruction.   Normal appendix. Thickening along the gastric antrum, may represent   gastritis however recommend direct visualization to exclude mucosal   abnormality.    PERITONEUM: no trace to small ascites. Extensive pelvic infiltrative   changes. No free air. No discrete drainable fluid collections.    VESSELS: Atherosclerotic changes .  RETROPERITONEUM: Within normal limits.      ABDOMINAL WALL: within normal limits.  BONES: Degenerative changes and osteopenia.    IMPRESSION:      Pancolonic thickening with pericolonic infiltrative changes compatible   with pancolitis, differential including infectious versus inflammatory   etiology with consideration for C. Difficile infection. Mildly prominent   small bowel loops without evidence of obstruction    Thickening along the gastric antrum, may represent gastritis however   recommend direct visualization to exclude mucosal abnormality.      Marked thickeningalong the right lateral and anterior bladder wall   recommend correlation with urinalysis as well as direct visualization to   exclude neoplasm.    Extensive infiltrate changes in the pelvic fat nonspecific in nature.        < end of copied text >          Radiology: all available radiological tests reviewed    Advanced directives addressed: full resuscitation

## 2019-05-25 LAB
ANION GAP SERPL CALC-SCNC: 9 MMOL/L — SIGNIFICANT CHANGE UP (ref 5–17)
BUN SERPL-MCNC: 16 MG/DL — SIGNIFICANT CHANGE UP (ref 7–23)
CALCIUM SERPL-MCNC: 8.9 MG/DL — SIGNIFICANT CHANGE UP (ref 8.5–10.1)
CHLORIDE SERPL-SCNC: 102 MMOL/L — SIGNIFICANT CHANGE UP (ref 96–108)
CO2 SERPL-SCNC: 31 MMOL/L — SIGNIFICANT CHANGE UP (ref 22–31)
CREAT SERPL-MCNC: 1.35 MG/DL — HIGH (ref 0.5–1.3)
CRP SERPL-MCNC: 1.35 MG/DL — HIGH (ref 0–0.4)
ERYTHROCYTE [SEDIMENTATION RATE] IN BLOOD: 11 MM/HR — SIGNIFICANT CHANGE UP (ref 0–20)
GLUCOSE SERPL-MCNC: 101 MG/DL — HIGH (ref 70–99)
POTASSIUM SERPL-MCNC: 3.4 MMOL/L — LOW (ref 3.5–5.3)
POTASSIUM SERPL-SCNC: 3.4 MMOL/L — LOW (ref 3.5–5.3)
SODIUM SERPL-SCNC: 142 MMOL/L — SIGNIFICANT CHANGE UP (ref 135–145)

## 2019-05-25 PROCEDURE — 71045 X-RAY EXAM CHEST 1 VIEW: CPT | Mod: 26

## 2019-05-25 RX ORDER — POTASSIUM CHLORIDE 20 MEQ
40 PACKET (EA) ORAL EVERY 4 HOURS
Refills: 0 | Status: COMPLETED | OUTPATIENT
Start: 2019-05-25 | End: 2019-05-25

## 2019-05-25 RX ADMIN — PIPERACILLIN AND TAZOBACTAM 25 GRAM(S): 4; .5 INJECTION, POWDER, LYOPHILIZED, FOR SOLUTION INTRAVENOUS at 05:35

## 2019-05-25 RX ADMIN — Medication 40 MILLIEQUIVALENT(S): at 14:42

## 2019-05-25 RX ADMIN — Medication 5 MILLIGRAM(S): at 21:51

## 2019-05-25 RX ADMIN — ONDANSETRON 4 MILLIGRAM(S): 8 TABLET, FILM COATED ORAL at 08:44

## 2019-05-25 RX ADMIN — PIPERACILLIN AND TAZOBACTAM 25 GRAM(S): 4; .5 INJECTION, POWDER, LYOPHILIZED, FOR SOLUTION INTRAVENOUS at 21:50

## 2019-05-25 RX ADMIN — ONDANSETRON 4 MILLIGRAM(S): 8 TABLET, FILM COATED ORAL at 20:08

## 2019-05-25 RX ADMIN — HEPARIN SODIUM 5000 UNIT(S): 5000 INJECTION INTRAVENOUS; SUBCUTANEOUS at 13:37

## 2019-05-25 RX ADMIN — PIPERACILLIN AND TAZOBACTAM 25 GRAM(S): 4; .5 INJECTION, POWDER, LYOPHILIZED, FOR SOLUTION INTRAVENOUS at 13:38

## 2019-05-25 RX ADMIN — Medication 325 MILLIGRAM(S): at 11:21

## 2019-05-25 RX ADMIN — Medication 100 MILLIGRAM(S): at 10:19

## 2019-05-25 RX ADMIN — Medication 40 MILLIEQUIVALENT(S): at 08:46

## 2019-05-25 RX ADMIN — Medication 1 TABLET(S): at 11:21

## 2019-05-25 RX ADMIN — HEPARIN SODIUM 5000 UNIT(S): 5000 INJECTION INTRAVENOUS; SUBCUTANEOUS at 21:51

## 2019-05-25 RX ADMIN — HEPARIN SODIUM 5000 UNIT(S): 5000 INJECTION INTRAVENOUS; SUBCUTANEOUS at 05:34

## 2019-05-25 NOTE — PROGRESS NOTE ADULT - SUBJECTIVE AND OBJECTIVE BOX
cc: Diarrhea  · Subjective and Objective: 	  85yo/M with PMH CAD s/p CABG in 2005, HTN, hyperlipidemia, OA s/p RT TKR, presented for evaluation of diarrhea. Patient states he had symptoms for almost a month, had been seen by GI as outpatient and had cdiff done about a week ago that was negative. He continues to have diarrhea now associated with urinary incontinence at time and dysuria. Patient was seen by DR Key as outpatient and supposed to have cystoscopy with biopsy last week but cancelled 2 to not feeling well with diarrhea and feeling weak. He denies chest pain or SOB. No fever or chills. Feels abdomen is distended and uncomfortable      05/22/19: Patient seen and examined. Feels much better today. Still with diarrhea. Discussed with patient and son at bed side regarding management plan.   05/23/19: Patient seen and examined. Diarrhea improving. Appetite improved.   5/24: Pt with multiple loose BMS daily, also with distended abd.  Pt had episode of N/V after drinking contrast for CT.  No fever, chills, n, v.  5/25: No diarrhea today, some nausea.  Still with distended abdomin denies pain but admits to discomfort.    REVIEW OF SYSTEMS: All other review of systems is negative unless indicated above.    Vital Signs Last 24 Hrs  T(C): 36.6 (25 May 2019 10:17), Max: 36.8 (24 May 2019 22:00)  T(F): 97.9 (25 May 2019 10:17), Max: 98.2 (24 May 2019 22:00)  HR: 81 (25 May 2019 10:17) (63 - 81)  BP: 103/87 (25 May 2019 10:17) (94/53 - 114/58)  BP(mean): --  RR: 16 (25 May 2019 10:17) (16 - 18)  SpO2: 94% (25 May 2019 10:17) (94% - 96%)    PHYSICAL EXAM:    Constitutional: NAD, awake and alert, well-developed  HEENT: PERR, EOMI, Normal Hearing, MMM  Neck: Soft and supple  Respiratory: Breath sounds are clear bilaterally, No wheezing, rales or rhonchi  Cardiovascular: S1 and S2, regular rate and rhythm, no Murmurs, gallops or rubs  Gastrointestinal: Bowel Sounds present, soft, nontender, distended, no guarding, no rebound  Extremities: No peripheral edema  Neurological: A/O x 3, no focal deficits in my limited exam  Skin: No rashes    MEDICATIONS  (STANDING):  aspirin 325 milliGRAM(s) Oral daily  atorvastatin 20 milliGRAM(s) Oral at bedtime  enalapril 20 milliGRAM(s) Oral daily  heparin  Injectable 5000 Unit(s) SubCutaneous every 8 hours  melatonin 5 milliGRAM(s) Oral at bedtime  metoprolol succinate  milliGRAM(s) Oral daily  multivitamin 1 Tablet(s) Oral daily  piperacillin/tazobactam IVPB. 3.375 Gram(s) IV Intermittent every 8 hours    MEDICATIONS  (PRN):  acetaminophen   Tablet .. 650 milliGRAM(s) Oral every 6 hours PRN Temp greater or equal to 38C (100.4F), Mild Pain (1 - 3)  aluminum hydroxide/magnesium hydroxide/simethicone Suspension 30 milliLiter(s) Oral every 4 hours PRN Dyspepsia  ondansetron Injectable 4 milliGRAM(s) IV Push every 6 hours PRN Nausea                              12.7   7.54  )-----------( 217      ( 24 May 2019 06:23 )             40.3     05-25    142  |  102  |  16  ----------------------------<  101<H>  3.4<L>   |  31  |  1.35<H>    Ca    8.9      25 May 2019 06:34      CAPILLARY BLOOD GLUCOSE    Assessment and Plan:      Diarrhea/abd distention: due to Pancolitis with extensive pelvic inflammation: Infectious vs inflammatory  -cdiff negative  -GI PCR negative  -antibiotics changed from flagyl/cipro to zosyn due to nausea per ID  -repeat CT imaging still with extensive inflammation, no obstruction   -AM labs w/ inflammatory markers  -f/u GI - may need endoscopic evaluation.     # H/O hematuria  -CT shows b/l hydroureternephrosis with thickened bladder  -Pt will need outpatient cystoscopy with biopsy w/ Dr. Briseno    #CAD s/p CABG  Continue aspirin, lipitor    Anemia: Stable  -monitor    #HTN  Stable  Continue lopressor and Vasotec    # DVT prophylaxis-on heparin                                                Electronic Signatures:  Yareli Johnson)  (Signed 23-May-2019 12:24)  	Authored: Progress Note, Reason for Admission, Subjective and Objective      Last Updated: 23-May-2019 12:24 by Yareli Johnson)

## 2019-05-25 NOTE — PROGRESS NOTE ADULT - ASSESSMENT
Imp:  Diarrhea and ascites  Colitis by CT but stool studies negative    Rec:  Check stool fat and calpro  serum chromogranin and gastrin  Celiac panel, immunoglobulins  May need colonoscopy next week  Check hepatitis serologies

## 2019-05-25 NOTE — PROGRESS NOTE ADULT - SUBJECTIVE AND OBJECTIVE BOX
Patient is a 86y old  Male who presents with a chief complaint of diarrhea (24 May 2019 14:38)      Subective:  Diarrhea persists  CT with unchanged colitis but more ascites    PAST MEDICAL & SURGICAL HISTORY:  HLD (hyperlipidemia)  HTN (hypertension)  Osteoarthrosis  Hyperlipidemia  Hypertension  CAD (coronary artery disease)  H/O total knee replacement, right  History of cholecystectomy  S/P CABG x 5      MEDICATIONS  (STANDING):  aspirin 325 milliGRAM(s) Oral daily  atorvastatin 20 milliGRAM(s) Oral at bedtime  enalapril 20 milliGRAM(s) Oral daily  heparin  Injectable 5000 Unit(s) SubCutaneous every 8 hours  melatonin 5 milliGRAM(s) Oral at bedtime  metoprolol succinate  milliGRAM(s) Oral daily  multivitamin 1 Tablet(s) Oral daily  piperacillin/tazobactam IVPB. 3.375 Gram(s) IV Intermittent every 8 hours  potassium chloride    Tablet ER 40 milliEquivalent(s) Oral every 4 hours    MEDICATIONS  (PRN):  acetaminophen   Tablet .. 650 milliGRAM(s) Oral every 6 hours PRN Temp greater or equal to 38C (100.4F), Mild Pain (1 - 3)  aluminum hydroxide/magnesium hydroxide/simethicone Suspension 30 milliLiter(s) Oral every 4 hours PRN Dyspepsia  ondansetron Injectable 4 milliGRAM(s) IV Push every 6 hours PRN Nausea      REVIEW OF SYSTEMS:    RESPIRATORY: No shortness of breath  CARDIOVASCULAR: No chest pain  All other review of systems is negative unless indicated above.    Vital Signs Last 24 Hrs  T(C): 36.6 (25 May 2019 05:17), Max: 36.8 (24 May 2019 22:00)  T(F): 97.8 (25 May 2019 05:17), Max: 98.2 (24 May 2019 22:00)  HR: 63 (25 May 2019 05:44) (63 - 72)  BP: 94/53 (25 May 2019 05:44) (94/53 - 114/58)  BP(mean): --  RR: 17 (25 May 2019 05:17) (17 - 18)  SpO2: 96% (25 May 2019 05:17) (94% - 97%)    PHYSICAL EXAM:    Constitutional: NAD, well-developed  Respiratory: CTAB  Cardiovascular: S1 and S2, RRR  Gastrointestinal: BS+, soft, NT/ND  Extremities: No peripheral edema  Psychiatric: Normal mood, normal affect    LABS:                        12.7   7.54  )-----------( 217      ( 24 May 2019 06:23 )             40.3     05-25    142  |  102  |  16  ----------------------------<  101<H>  3.4<L>   |  31  |  1.35<H>    Ca    8.9      25 May 2019 06:34            RADIOLOGY & ADDITIONAL STUDIES:

## 2019-05-26 LAB
ALBUMIN SERPL ELPH-MCNC: 3.1 G/DL — LOW (ref 3.3–5)
ALP SERPL-CCNC: 62 U/L — SIGNIFICANT CHANGE UP (ref 40–120)
ALT FLD-CCNC: 22 U/L — SIGNIFICANT CHANGE UP (ref 12–78)
ANION GAP SERPL CALC-SCNC: 9 MMOL/L — SIGNIFICANT CHANGE UP (ref 5–17)
AST SERPL-CCNC: 27 U/L — SIGNIFICANT CHANGE UP (ref 15–37)
BILIRUB DIRECT SERPL-MCNC: 0.2 MG/DL — SIGNIFICANT CHANGE UP (ref 0–0.2)
BILIRUB INDIRECT FLD-MCNC: 0.4 MG/DL — SIGNIFICANT CHANGE UP (ref 0.2–1)
BILIRUB SERPL-MCNC: 0.6 MG/DL — SIGNIFICANT CHANGE UP (ref 0.2–1.2)
BUN SERPL-MCNC: 20 MG/DL — SIGNIFICANT CHANGE UP (ref 7–23)
CALCIUM SERPL-MCNC: 8.8 MG/DL — SIGNIFICANT CHANGE UP (ref 8.5–10.1)
CHLORIDE SERPL-SCNC: 103 MMOL/L — SIGNIFICANT CHANGE UP (ref 96–108)
CO2 SERPL-SCNC: 29 MMOL/L — SIGNIFICANT CHANGE UP (ref 22–31)
CREAT SERPL-MCNC: 1.25 MG/DL — SIGNIFICANT CHANGE UP (ref 0.5–1.3)
FERRITIN SERPL-MCNC: 468 NG/ML — HIGH (ref 30–400)
GLUCOSE SERPL-MCNC: 108 MG/DL — HIGH (ref 70–99)
HAV IGM SER-ACNC: SIGNIFICANT CHANGE UP
HBV CORE IGM SER-ACNC: SIGNIFICANT CHANGE UP
HBV SURFACE AG SER-ACNC: SIGNIFICANT CHANGE UP
HCV AB S/CO SERPL IA: 0.11 S/CO — SIGNIFICANT CHANGE UP (ref 0–0.99)
HCV AB SERPL-IMP: SIGNIFICANT CHANGE UP
IRON SATN MFR SERPL: 30 % — SIGNIFICANT CHANGE UP (ref 16–55)
IRON SATN MFR SERPL: 59 UG/DL — SIGNIFICANT CHANGE UP (ref 45–165)
POTASSIUM SERPL-MCNC: 4.2 MMOL/L — SIGNIFICANT CHANGE UP (ref 3.5–5.3)
POTASSIUM SERPL-SCNC: 4.2 MMOL/L — SIGNIFICANT CHANGE UP (ref 3.5–5.3)
PROT SERPL-MCNC: 6.5 GM/DL — SIGNIFICANT CHANGE UP (ref 6–8.3)
SODIUM SERPL-SCNC: 141 MMOL/L — SIGNIFICANT CHANGE UP (ref 135–145)
TIBC SERPL-MCNC: 196 UG/DL — LOW (ref 220–430)
UIBC SERPL-MCNC: 137 UG/DL — SIGNIFICANT CHANGE UP (ref 110–370)

## 2019-05-26 RX ORDER — METOCLOPRAMIDE HCL 10 MG
10 TABLET ORAL EVERY 8 HOURS
Refills: 0 | Status: DISCONTINUED | OUTPATIENT
Start: 2019-05-26 | End: 2019-05-28

## 2019-05-26 RX ORDER — ALPRAZOLAM 0.25 MG
0.25 TABLET ORAL AT BEDTIME
Refills: 0 | Status: DISCONTINUED | OUTPATIENT
Start: 2019-05-26 | End: 2019-05-27

## 2019-05-26 RX ORDER — SIMETHICONE 80 MG/1
80 TABLET, CHEWABLE ORAL EVERY 8 HOURS
Refills: 0 | Status: DISCONTINUED | OUTPATIENT
Start: 2019-05-26 | End: 2019-06-11

## 2019-05-26 RX ADMIN — HEPARIN SODIUM 5000 UNIT(S): 5000 INJECTION INTRAVENOUS; SUBCUTANEOUS at 20:42

## 2019-05-26 RX ADMIN — HEPARIN SODIUM 5000 UNIT(S): 5000 INJECTION INTRAVENOUS; SUBCUTANEOUS at 14:06

## 2019-05-26 RX ADMIN — SIMETHICONE 80 MILLIGRAM(S): 80 TABLET, CHEWABLE ORAL at 12:48

## 2019-05-26 RX ADMIN — ONDANSETRON 4 MILLIGRAM(S): 8 TABLET, FILM COATED ORAL at 03:06

## 2019-05-26 RX ADMIN — HEPARIN SODIUM 5000 UNIT(S): 5000 INJECTION INTRAVENOUS; SUBCUTANEOUS at 05:04

## 2019-05-26 RX ADMIN — PIPERACILLIN AND TAZOBACTAM 25 GRAM(S): 4; .5 INJECTION, POWDER, LYOPHILIZED, FOR SOLUTION INTRAVENOUS at 14:06

## 2019-05-26 RX ADMIN — Medication 0.25 MILLIGRAM(S): at 20:43

## 2019-05-26 RX ADMIN — Medication 10 MILLIGRAM(S): at 20:42

## 2019-05-26 RX ADMIN — Medication 5 MILLIGRAM(S): at 20:43

## 2019-05-26 RX ADMIN — SIMETHICONE 80 MILLIGRAM(S): 80 TABLET, CHEWABLE ORAL at 20:43

## 2019-05-26 RX ADMIN — Medication 10 MILLIGRAM(S): at 12:39

## 2019-05-26 RX ADMIN — PIPERACILLIN AND TAZOBACTAM 25 GRAM(S): 4; .5 INJECTION, POWDER, LYOPHILIZED, FOR SOLUTION INTRAVENOUS at 05:03

## 2019-05-26 RX ADMIN — PIPERACILLIN AND TAZOBACTAM 25 GRAM(S): 4; .5 INJECTION, POWDER, LYOPHILIZED, FOR SOLUTION INTRAVENOUS at 20:44

## 2019-05-26 NOTE — PROGRESS NOTE ADULT - SUBJECTIVE AND OBJECTIVE BOX
Patient is a 86y old  Male who presents with a chief complaint of diarrhea (24 May 2019 14:38)      Subective:  "thinks" he had diarrhea today  +mild lower abd discomfort  nauseated, no vomiting    PAST MEDICAL & SURGICAL HISTORY:  HLD (hyperlipidemia)  HTN (hypertension)  Osteoarthrosis  Hyperlipidemia  Hypertension  CAD (coronary artery disease)  H/O total knee replacement, right  History of cholecystectomy  S/P CABG x 5    MEDICATIONS  (STANDING):  aspirin 325 milliGRAM(s) Oral daily  atorvastatin 20 milliGRAM(s) Oral at bedtime  enalapril 20 milliGRAM(s) Oral daily  heparin  Injectable 5000 Unit(s) SubCutaneous every 8 hours  melatonin 5 milliGRAM(s) Oral at bedtime  metoprolol succinate  milliGRAM(s) Oral daily  multivitamin 1 Tablet(s) Oral daily  piperacillin/tazobactam IVPB. 3.375 Gram(s) IV Intermittent every 8 hours  potassium chloride    Tablet ER 40 milliEquivalent(s) Oral every 4 hours    MEDICATIONS  (PRN):  acetaminophen   Tablet .. 650 milliGRAM(s) Oral every 6 hours PRN Temp greater or equal to 38C (100.4F), Mild Pain (1 - 3)  aluminum hydroxide/magnesium hydroxide/simethicone Suspension 30 milliLiter(s) Oral every 4 hours PRN Dyspepsia  ondansetron Injectable 4 milliGRAM(s) IV Push every 6 hours PRN Nausea      REVIEW OF SYSTEMS:    RESPIRATORY: No shortness of breath  CARDIOVASCULAR: No chest pain  All other review of systems is negative unless indicated above.    Vital Signs Last 24 Hrs  T(C): 37.2 (26 May 2019 17:16), Max: 37.2 (26 May 2019 17:16)  T(F): 99 (26 May 2019 17:16), Max: 99 (26 May 2019 17:16)  HR: 86 (26 May 2019 17:16) (86 - 91)  BP: 121/60 (26 May 2019 17:16) (104/78 - 123/73)  BP(mean): --  RR: 18 (26 May 2019 17:16) (16 - 18)  SpO2: 96% (26 May 2019 17:16) (94% - 99%)    PHYSICAL EXAM:    Constitutional: NAD, well-developed  Respiratory: CTAB  Cardiovascular: S1 and S2, RRR  Gastrointestinal: BS+, soft, mild lower abd tenderness, mild distension  Extremities: 1+ b/l LE edema  Psychiatric: Normal mood, normal affect    LABS:

## 2019-05-26 NOTE — PROGRESS NOTE ADULT - SUBJECTIVE AND OBJECTIVE BOX
cc: Diarrhea  · Subjective and Objective: 	  85yo/M with PMH CAD s/p CABG in 2005, HTN, hyperlipidemia, OA s/p RT TKR, presented for evaluation of diarrhea. Patient states he had symptoms for almost a month, had been seen by GI as outpatient and had cdiff done about a week ago that was negative. He continues to have diarrhea now associated with urinary incontinence at time and dysuria. Patient was seen by DR Key as outpatient and supposed to have cystoscopy with biopsy last week but cancelled 2 to not feeling well with diarrhea and feeling weak. He denies chest pain or SOB. No fever or chills. Feels abdomen is distended and uncomfortable      05/22/19: Patient seen and examined. Feels much better today. Still with diarrhea. Discussed with patient and son at bed side regarding management plan.   05/23/19: Patient seen and examined. Diarrhea improving. Appetite improved.   5/24: Pt with multiple loose BMS daily, also with distended abd.  Pt had episode of N/V after drinking contrast for CT.  No fever, chills, n, v.  5/25: No diarrhea today, some nausea.  Still with distended abdomin denies pain but admits to discomfort.  5/26: Pt with distention, nausea, no vomiting.  He is not taking much food.  Has few BMs, no active diarrhea at this time.  No fever, chills, sob, cp.    REVIEW OF SYSTEMS: All other review of systems is negative unless indicated above.    Vital Signs Last 24 Hrs  T(C): 36.8 (26 May 2019 10:51), Max: 36.8 (26 May 2019 10:51)  T(F): 98.2 (26 May 2019 10:51), Max: 98.2 (26 May 2019 10:51)  HR: 86 (26 May 2019 10:51) (86 - 92)  BP: 122/74 (26 May 2019 10:51) (104/78 - 123/73)  BP(mean): --  RR: 16 (26 May 2019 10:51) (16 - 18)  SpO2: 94% (26 May 2019 10:51) (94% - 99%)    PHYSICAL EXAM:    Constitutional: NAD, awake and alert, well-developed  HEENT: PERR, EOMI, Normal Hearing, MMM  Neck: Soft and supple  Respiratory: Breath sounds are clear bilaterally, No wheezing, rales or rhonchi  Cardiovascular: S1 and S2, regular rate and rhythm, no Murmurs, gallops or rubs  Gastrointestinal: Bowel Sounds present, soft, nontender, distended, no guarding, no rebound  Extremities: No peripheral edema  Neurological: A/O x 3, no focal deficits in my limited exam  Skin: No rashes    MEDICATIONS  (STANDING):  ALPRAZolam 0.25 milliGRAM(s) Oral at bedtime  aspirin 325 milliGRAM(s) Oral daily  atorvastatin 20 milliGRAM(s) Oral at bedtime  enalapril 20 milliGRAM(s) Oral daily  heparin  Injectable 5000 Unit(s) SubCutaneous every 8 hours  melatonin 5 milliGRAM(s) Oral at bedtime  metoprolol succinate  milliGRAM(s) Oral daily  multivitamin 1 Tablet(s) Oral daily  piperacillin/tazobactam IVPB. 3.375 Gram(s) IV Intermittent every 8 hours    MEDICATIONS  (PRN):  acetaminophen   Tablet .. 650 milliGRAM(s) Oral every 6 hours PRN Temp greater or equal to 38C (100.4F), Mild Pain (1 - 3)  aluminum hydroxide/magnesium hydroxide/simethicone Suspension 30 milliLiter(s) Oral every 4 hours PRN Dyspepsia  metoclopramide Injectable 10 milliGRAM(s) IV Push every 8 hours PRN nausea, vomiting  ondansetron Injectable 4 milliGRAM(s) IV Push every 6 hours PRN Nausea  simethicone 80 milliGRAM(s) Chew every 8 hours PRN Gas          05-26    141  |  103  |  20  ----------------------------<  108<H>  4.2   |  29  |  1.25    Ca    8.8      26 May 2019 07:15    TPro  6.5  /  Alb  3.1<L>  /  TBili  0.6  /  DBili  0.2  /  AST  27  /  ALT  22  /  AlkPhos  62  05-26    CAPILLARY BLOOD GLUCOSE        LIVER FUNCTIONS - ( 26 May 2019 07:15 )  Alb: 3.1 g/dL / Pro: 6.5 gm/dL / ALK PHOS: 62 U/L / ALT: 22 U/L / AST: 27 U/L / GGT: x             Assessment and Plan:      Diarrhea/nausea/abd distention: due to Pancolitis with extensive pelvic inflammation: Infectious vs inflammatory  -cdiff negative  -GI PCR negative  -antibiotics changed from flagyl/cipro to zosyn due to nausea per ID  -repeat CT imaging still with extensive inflammation, no obstruction   -ESR: 34  -supportive care  -further GI studies pending   -f/u GI - may need endoscopic evaluation.     # H/O hematuria  -CT shows b/l hydroureternephrosis with thickened bladder  -Pt will need outpatient cystoscopy with biopsy w/ Dr. Briseno to r/o malignancy    #CAD s/p CABG  Continue aspirin, lipitor    Anemia: Stable  -monitor    #HTN  Stable  Continue lopressor and Vasotec    # DVT prophylaxis-on heparin                                                Electronic Signatures:  Yareli Johnson)  (Signed 23-May-2019 12:24)  	Authored: Progress Note, Reason for Admission, Subjective and Objective      Last Updated: 23-May-2019 12:24 by Yareli Johnson)

## 2019-05-26 NOTE — PROGRESS NOTE ADULT - ASSESSMENT
Imp:  Diarrhea and ascites  Colitis by CT but stool studies negative  not improving significantly    Rec:  follow up stool fat and calpro; serum chromogranin and gastrin; Celiac panel, immunoglobulins; hepatitis serologies  May need colonoscopy +/- EGD this upcoming week, will defer to Dr. Mackenzie

## 2019-05-27 LAB
ANION GAP SERPL CALC-SCNC: 11 MMOL/L — SIGNIFICANT CHANGE UP (ref 5–17)
BASE EXCESS BLDA CALC-SCNC: -6.4 MMOL/L — LOW (ref -2–2)
BUN SERPL-MCNC: 25 MG/DL — HIGH (ref 7–23)
CALCIUM SERPL-MCNC: 7.8 MG/DL — LOW (ref 8.5–10.1)
CHLORIDE SERPL-SCNC: 105 MMOL/L — SIGNIFICANT CHANGE UP (ref 96–108)
CO2 SERPL-SCNC: 23 MMOL/L — SIGNIFICANT CHANGE UP (ref 22–31)
CREAT SERPL-MCNC: 1.52 MG/DL — HIGH (ref 0.5–1.3)
CULTURE RESULTS: SIGNIFICANT CHANGE UP
CULTURE RESULTS: SIGNIFICANT CHANGE UP
GAS PNL BLDA: SIGNIFICANT CHANGE UP
GLUCOSE BLDC GLUCOMTR-MCNC: 219 MG/DL — HIGH (ref 70–99)
GLUCOSE SERPL-MCNC: 206 MG/DL — HIGH (ref 70–99)
HCO3 BLDA-SCNC: 19 MMOL/L — LOW (ref 21–29)
HCT VFR BLD CALC: 34.9 % — LOW (ref 39–50)
HGB BLD-MCNC: 10.8 G/DL — LOW (ref 13–17)
HOROWITZ INDEX BLDA+IHG-RTO: SIGNIFICANT CHANGE UP
IGA FLD-MCNC: 169 MG/DL — SIGNIFICANT CHANGE UP (ref 84–499)
IGG FLD-MCNC: 872 MG/DL — SIGNIFICANT CHANGE UP (ref 610–1660)
IGM SERPL-MCNC: 138 MG/DL — SIGNIFICANT CHANGE UP (ref 35–242)
KAPPA LC SER QL IFE: 2.87 MG/DL — HIGH (ref 0.33–1.94)
KAPPA/LAMBDA FREE LIGHT CHAIN RATIO, SERUM: 1.16 RATIO — SIGNIFICANT CHANGE UP (ref 0.26–1.65)
LAMBDA LC SER QL IFE: 2.47 MG/DL — SIGNIFICANT CHANGE UP (ref 0.57–2.63)
MCHC RBC-ENTMCNC: 29 PG — SIGNIFICANT CHANGE UP (ref 27–34)
MCHC RBC-ENTMCNC: 30.9 GM/DL — LOW (ref 32–36)
MCV RBC AUTO: 93.8 FL — SIGNIFICANT CHANGE UP (ref 80–100)
PCO2 BLDA: 41 MMHG — SIGNIFICANT CHANGE UP (ref 32–46)
PH BLDA: 7.29 — LOW (ref 7.35–7.45)
PLATELET # BLD AUTO: 281 K/UL — SIGNIFICANT CHANGE UP (ref 150–400)
PO2 BLDA: 86 MMHG — SIGNIFICANT CHANGE UP (ref 74–108)
POTASSIUM SERPL-MCNC: 3.7 MMOL/L — SIGNIFICANT CHANGE UP (ref 3.5–5.3)
POTASSIUM SERPL-SCNC: 3.7 MMOL/L — SIGNIFICANT CHANGE UP (ref 3.5–5.3)
RBC # BLD: 3.72 M/UL — LOW (ref 4.2–5.8)
RBC # FLD: 13.6 % — SIGNIFICANT CHANGE UP (ref 10.3–14.5)
SAO2 % BLDA: 94 % — SIGNIFICANT CHANGE UP (ref 92–96)
SODIUM SERPL-SCNC: 139 MMOL/L — SIGNIFICANT CHANGE UP (ref 135–145)
SPECIMEN SOURCE: SIGNIFICANT CHANGE UP
SPECIMEN SOURCE: SIGNIFICANT CHANGE UP
TROPONIN I SERPL-MCNC: 0.05 NG/ML — HIGH (ref 0.01–0.04)
WBC # BLD: 8.62 K/UL — SIGNIFICANT CHANGE UP (ref 3.8–10.5)
WBC # FLD AUTO: 8.62 K/UL — SIGNIFICANT CHANGE UP (ref 3.8–10.5)

## 2019-05-27 PROCEDURE — 99232 SBSQ HOSP IP/OBS MODERATE 35: CPT

## 2019-05-27 PROCEDURE — 93010 ELECTROCARDIOGRAM REPORT: CPT

## 2019-05-27 PROCEDURE — 71045 X-RAY EXAM CHEST 1 VIEW: CPT | Mod: 26

## 2019-05-27 RX ORDER — SODIUM CHLORIDE 9 MG/ML
1000 INJECTION INTRAMUSCULAR; INTRAVENOUS; SUBCUTANEOUS
Refills: 0 | Status: DISCONTINUED | OUTPATIENT
Start: 2019-05-27 | End: 2019-05-30

## 2019-05-27 RX ORDER — PHENYLEPHRINE HYDROCHLORIDE 10 MG/ML
1 INJECTION INTRAVENOUS
Qty: 40 | Refills: 0 | Status: DISCONTINUED | OUTPATIENT
Start: 2019-05-27 | End: 2019-05-29

## 2019-05-27 RX ORDER — SODIUM CHLORIDE 9 MG/ML
1000 INJECTION INTRAMUSCULAR; INTRAVENOUS; SUBCUTANEOUS ONCE
Refills: 0 | Status: COMPLETED | OUTPATIENT
Start: 2019-05-27 | End: 2019-05-27

## 2019-05-27 RX ORDER — SOD SULF/SODIUM/NAHCO3/KCL/PEG
4000 SOLUTION, RECONSTITUTED, ORAL ORAL ONCE
Refills: 0 | Status: COMPLETED | OUTPATIENT
Start: 2019-05-27 | End: 2019-05-27

## 2019-05-27 RX ADMIN — PIPERACILLIN AND TAZOBACTAM 25 GRAM(S): 4; .5 INJECTION, POWDER, LYOPHILIZED, FOR SOLUTION INTRAVENOUS at 14:07

## 2019-05-27 RX ADMIN — Medication 325 MILLIGRAM(S): at 10:31

## 2019-05-27 RX ADMIN — Medication 10 MILLIGRAM(S): at 10:30

## 2019-05-27 RX ADMIN — SODIUM CHLORIDE 100 MILLILITER(S): 9 INJECTION INTRAMUSCULAR; INTRAVENOUS; SUBCUTANEOUS at 21:00

## 2019-05-27 RX ADMIN — Medication 1 TABLET(S): at 10:31

## 2019-05-27 RX ADMIN — SIMETHICONE 80 MILLIGRAM(S): 80 TABLET, CHEWABLE ORAL at 06:22

## 2019-05-27 RX ADMIN — Medication 20 MILLIGRAM(S): at 10:31

## 2019-05-27 RX ADMIN — PHENYLEPHRINE HYDROCHLORIDE 36.49 MICROGRAM(S)/KG/MIN: 10 INJECTION INTRAVENOUS at 20:40

## 2019-05-27 RX ADMIN — Medication 2 MILLIGRAM(S): at 23:02

## 2019-05-27 RX ADMIN — PIPERACILLIN AND TAZOBACTAM 25 GRAM(S): 4; .5 INJECTION, POWDER, LYOPHILIZED, FOR SOLUTION INTRAVENOUS at 21:44

## 2019-05-27 RX ADMIN — HEPARIN SODIUM 5000 UNIT(S): 5000 INJECTION INTRAVENOUS; SUBCUTANEOUS at 22:21

## 2019-05-27 RX ADMIN — HEPARIN SODIUM 5000 UNIT(S): 5000 INJECTION INTRAVENOUS; SUBCUTANEOUS at 06:19

## 2019-05-27 RX ADMIN — HEPARIN SODIUM 5000 UNIT(S): 5000 INJECTION INTRAVENOUS; SUBCUTANEOUS at 14:07

## 2019-05-27 RX ADMIN — SODIUM CHLORIDE 2000 MILLILITER(S): 9 INJECTION INTRAMUSCULAR; INTRAVENOUS; SUBCUTANEOUS at 20:50

## 2019-05-27 RX ADMIN — Medication 100 MILLIGRAM(S): at 10:31

## 2019-05-27 RX ADMIN — Medication 4000 MILLILITER(S): at 12:48

## 2019-05-27 RX ADMIN — SIMETHICONE 80 MILLIGRAM(S): 80 TABLET, CHEWABLE ORAL at 18:47

## 2019-05-27 RX ADMIN — Medication 10 MILLIGRAM(S): at 18:15

## 2019-05-27 RX ADMIN — PIPERACILLIN AND TAZOBACTAM 25 GRAM(S): 4; .5 INJECTION, POWDER, LYOPHILIZED, FOR SOLUTION INTRAVENOUS at 06:19

## 2019-05-27 NOTE — PROGRESS NOTE ADULT - ASSESSMENT
85yo male with pancolitis  stool studies negative and after initial improvement on abx, he has not done well  we discussed options in detail and will plan for colonoscopy with biopsy tomorrow  I have spoken to his daughter Krystina as well  most likely diagnosis is ulcerative colitis, if endoscopic appearance is consistent will consider steroids

## 2019-05-27 NOTE — CHART NOTE - NSCHARTNOTEFT_GEN_A_CORE
Code blue called due to unresponsiveness. Pt found by family with difficulty speaking and c/o something stuck in his throat. Pt loss consciousness shortly thereafter. On arrival, CPR in progress. Patient intubated. Patient noted to have pulse at ~ 2min into ALS protocol. Patient transported to ICU for further management. Code blue called due to unresponsiveness. Pt found by family with difficulty speaking and c/o something stuck in his throat. Pt loss consciousness shortly thereafter. On arrival, CPR in progress. Patient intubated, NGT placed. Patient noted to have pulse at ~ 2min into ALS protocol, however still unresponsive. Patient transported to ICU for further management.

## 2019-05-27 NOTE — PROGRESS NOTE ADULT - SUBJECTIVE AND OBJECTIVE BOX
cc: Diarrhea  · Subjective and Objective: 	  85yo/M with PMH CAD s/p CABG in 2005, HTN, hyperlipidemia, OA s/p RT TKR, presented for evaluation of diarrhea. Patient states he had symptoms for almost a month, had been seen by GI as outpatient and had cdiff done about a week ago that was negative. He continues to have diarrhea now associated with urinary incontinence at time and dysuria. Patient was seen by DR Key as outpatient and supposed to have cystoscopy with biopsy last week but cancelled 2 to not feeling well with diarrhea and feeling weak. He denies chest pain or SOB. No fever or chills. Feels abdomen is distended and uncomfortable      05/22/19: Patient seen and examined. Feels much better today. Still with diarrhea. Discussed with patient and son at bed side regarding management plan.   05/23/19: Patient seen and examined. Diarrhea improving. Appetite improved.   5/24: Pt with multiple loose BMS daily, also with distended abd.  Pt had episode of N/V after drinking contrast for CT.  No fever, chills, n, v.  5/25: No diarrhea today, some nausea.  Still with distended abdomin denies pain but admits to discomfort.  5/26: Pt with distention, nausea, no vomiting.  He is not taking much food.  Has few BMs, no active diarrhea at this time.  No fever, chills, sob, cp.  5/27: Pt not improving on IV abx, still distended, with nausea, overall uncomfortable.  No fever, chills, sob, cp.    REVIEW OF SYSTEMS: All other review of systems is negative unless indicated above.    Vital Signs Last 24 Hrs  T(C): 36.7 (27 May 2019 05:06), Max: 37.2 (26 May 2019 17:16)  T(F): 98 (27 May 2019 05:06), Max: 99 (26 May 2019 17:16)  HR: 92 (27 May 2019 05:06) (83 - 92)  BP: 113/67 (27 May 2019 05:06) (113/67 - 136/72)  BP(mean): --  RR: 17 (27 May 2019 05:06) (17 - 18)  SpO2: 99% (27 May 2019 05:06) (94% - 99%)    PHYSICAL EXAM:    Constitutional: NAD, awake and alert, well-developed  HEENT: PERR, EOMI, Normal Hearing, MMM  Neck: Soft and supple  Respiratory: Breath sounds are clear bilaterally, No wheezing, rales or rhonchi  Cardiovascular: S1 and S2, regular rate and rhythm, no Murmurs, gallops or rubs  Gastrointestinal: Bowel Sounds present, soft, nontender, distended, no guarding, no rebound  Extremities: No peripheral edema  Neurological: A/O x 3, no focal deficits in my limited exam  Skin: No rashes    MEDICATIONS  (STANDING):  ALPRAZolam 0.25 milliGRAM(s) Oral at bedtime  aspirin 325 milliGRAM(s) Oral daily  atorvastatin 20 milliGRAM(s) Oral at bedtime  enalapril 20 milliGRAM(s) Oral daily  heparin  Injectable 5000 Unit(s) SubCutaneous every 8 hours  melatonin 5 milliGRAM(s) Oral at bedtime  metoprolol succinate  milliGRAM(s) Oral daily  multivitamin 1 Tablet(s) Oral daily  piperacillin/tazobactam IVPB. 3.375 Gram(s) IV Intermittent every 8 hours  polyethylene glycol/electrolyte Solution. 4000 milliLiter(s) Oral once    MEDICATIONS  (PRN):  acetaminophen   Tablet .. 650 milliGRAM(s) Oral every 6 hours PRN Temp greater or equal to 38C (100.4F), Mild Pain (1 - 3)  aluminum hydroxide/magnesium hydroxide/simethicone Suspension 30 milliLiter(s) Oral every 4 hours PRN Dyspepsia  metoclopramide Injectable 10 milliGRAM(s) IV Push every 8 hours PRN nausea, vomiting  ondansetron Injectable 4 milliGRAM(s) IV Push every 6 hours PRN Nausea  simethicone 80 milliGRAM(s) Chew every 8 hours PRN Gas          05-26    141  |  103  |  20  ----------------------------<  108<H>  4.2   |  29  |  1.25    Ca    8.8      26 May 2019 07:15    TPro  6.5  /  Alb  3.1<L>  /  TBili  0.6  /  DBili  0.2  /  AST  27  /  ALT  22  /  AlkPhos  62  05-26    CAPILLARY BLOOD GLUCOSE        LIVER FUNCTIONS - ( 26 May 2019 07:15 )  Alb: 3.1 g/dL / Pro: 6.5 gm/dL / ALK PHOS: 62 U/L / ALT: 22 U/L / AST: 27 U/L / GGT: x             Assessment and Plan:      Diarrhea/nausea/abd distention: due to Pancolitis with extensive pelvic inflammation: Infectious vs inflammatory  -cdiff negative  -GI PCR negative  -antibiotics changed from flagyl/cipro to zosyn due to nausea per ID  -repeat CT imaging still with extensive inflammation, no obstruction   -ESR: 34  -supportive care  -further GI studies pending   -f/u GI ---> colonoscopy tomorrow    # H/O hematuria  -CT shows b/l hydroureternephrosis with thickened bladder  -Pt will need outpatient cystoscopy with biopsy w/ Dr. Briseno to r/o malignancy    #CAD s/p CABG  Continue aspirin, lipitor    Anemia: Stable  -monitor    #HTN  Stable  Continue lopressor and Vasotec    # DVT prophylaxis-on heparin                                                Electronic Signatures:  Yareli Johnson)  (Signed 23-May-2019 12:24)  	Authored: Progress Note, Reason for Admission, Subjective and Objective      Last Updated: 23-May-2019 12:24 by Yareli Johnson)

## 2019-05-27 NOTE — PROVIDER CONTACT NOTE (CHANGE IN STATUS NOTIFICATION) - BACKGROUND
Pt. admitted with n/v/d, all tests negative to date, golytely started for bowel prep around 1300, pt unable to tolerate much this shift.

## 2019-05-27 NOTE — PROGRESS NOTE ADULT - ASSESSMENT
1. Patient admitted with abdominal pain distention, hx of coronary artery disease      now with cardiac arrest, ? likely aspiration, no acute ekg changes      Patient awake      patient intubated, will check cxr      check troponins      will order echo, cardiology consult      monitor bp      hydration      check cxr      labs pending,

## 2019-05-27 NOTE — PROVIDER CONTACT NOTE (OTHER) - RECOMMENDATIONS
"9/1/2017:   spoke to patient who reported he is "doing fine" and  can "go ahead and close my case".  Encouraged him to contact  if needs arise in the future.         "
Requested by Amos Petty

## 2019-05-27 NOTE — PROGRESS NOTE ADULT - SUBJECTIVE AND OBJECTIVE BOX
Patient is a 86y old  Male who presents with a chief complaint of diarrhea (26 May 2019 19:01)      HPI:  pt not feeling well overall  still with diarrhea and nausea  some distention but not firm    PAST MEDICAL & SURGICAL HISTORY:  HLD (hyperlipidemia)  HTN (hypertension)  Osteoarthrosis  Hyperlipidemia  Hypertension  CAD (coronary artery disease)  H/O total knee replacement, right  History of cholecystectomy  S/P CABG x 5    MEDICATIONS  (STANDING):  ALPRAZolam 0.25 milliGRAM(s) Oral at bedtime  aspirin 325 milliGRAM(s) Oral daily  atorvastatin 20 milliGRAM(s) Oral at bedtime  enalapril 20 milliGRAM(s) Oral daily  heparin  Injectable 5000 Unit(s) SubCutaneous every 8 hours  melatonin 5 milliGRAM(s) Oral at bedtime  metoprolol succinate  milliGRAM(s) Oral daily  multivitamin 1 Tablet(s) Oral daily  piperacillin/tazobactam IVPB. 3.375 Gram(s) IV Intermittent every 8 hours  polyethylene glycol/electrolyte Solution. 4000 milliLiter(s) Oral once    MEDICATIONS  (PRN):  acetaminophen   Tablet .. 650 milliGRAM(s) Oral every 6 hours PRN Temp greater or equal to 38C (100.4F), Mild Pain (1 - 3)  aluminum hydroxide/magnesium hydroxide/simethicone Suspension 30 milliLiter(s) Oral every 4 hours PRN Dyspepsia  metoclopramide Injectable 10 milliGRAM(s) IV Push every 8 hours PRN nausea, vomiting  ondansetron Injectable 4 milliGRAM(s) IV Push every 6 hours PRN Nausea  simethicone 80 milliGRAM(s) Chew every 8 hours PRN Gas    Allergies  No Known Allergies    REVIEW OF SYSTEMS:  CONSTITUTIONAL: weakness  RESPIRATORY: No cough, wheezing, hemoptysis; No shortness of breath  CARDIOVASCULAR: No chest pain or palpitations  GASTROINTESTINAL: persistent  All other review of systems is negative unless indicated above.    Vital Signs Last 24 Hrs  T(C): 36.7 (27 May 2019 05:06), Max: 37.2 (26 May 2019 17:16)  T(F): 98 (27 May 2019 05:06), Max: 99 (26 May 2019 17:16)  HR: 92 (27 May 2019 05:06) (83 - 92)  BP: 113/67 (27 May 2019 05:06) (113/67 - 136/72)  BP(mean): --  RR: 17 (27 May 2019 05:06) (16 - 18)  SpO2: 99% (27 May 2019 05:06) (94% - 99%)    PHYSICAL EXAM:    Constitutional: elderly male not feeling well  Respiratory: CTA  Cardiovascular: S1 and S2  Gastrointestinal: BS+, soft, mildly distended  Psychiatric: Normal mood, normal affect      LABS:    05-26    141  |  103  |  20  ----------------------------<  108<H>  4.2   |  29  |  1.25    Ca    8.8      26 May 2019 07:15    TPro  6.5  /  Alb  3.1<L>  /  TBili  0.6  /  DBili  0.2  /  AST  27  /  ALT  22  /  AlkPhos  62  05-26      LIVER FUNCTIONS - ( 26 May 2019 07:15 )  Alb: 3.1 g/dL / Pro: 6.5 gm/dL / ALK PHOS: 62 U/L / ALT: 22 U/L / AST: 27 U/L / GGT: x             RADIOLOGY & ADDITIONAL STUDIES:

## 2019-05-27 NOTE — PROGRESS NOTE ADULT - SUBJECTIVE AND OBJECTIVE BOX
responded to Code Blue    HPI:      This patient is an 86 year old male with hx. of CAD, s/p CABG about 5 years ago who presented with weakness and persistent diarrea      CT of abdomen and Pelvis showed ? early sbo, thickened colon and Pelvic inflammation. The patient was receiving a bowel prep which      he was having some difficulty taking. He accutely developed respiratory distress, ? vomitting, ? aspiration and than had cardiac arrest      Code Blue was called.  On arrival cpr was started , patient was emergently intubated. Patient received 1 epinephrine, approximately 5 minutes of      cpr. THan ROSC was obtained. Patient did awake and follow commands after arrest, OG tube was placed. ekg no acute st changes.      Focused echo, good lv function, not on pressors  PMH:           as above       MEDICATIONS  (STANDING):  ALPRAZolam 0.25 milliGRAM(s) Oral at bedtime  aspirin 325 milliGRAM(s) Oral daily  atorvastatin 20 milliGRAM(s) Oral at bedtime  enalapril 20 milliGRAM(s) Oral daily  heparin  Injectable 5000 Unit(s) SubCutaneous every 8 hours  melatonin 5 milliGRAM(s) Oral at bedtime  metoprolol succinate  milliGRAM(s) Oral daily  multivitamin 1 Tablet(s) Oral daily  piperacillin/tazobactam IVPB. 3.375 Gram(s) IV Intermittent every 8 hours    MEDICATIONS  (PRN):  acetaminophen   Tablet .. 650 milliGRAM(s) Oral every 6 hours PRN Temp greater or equal to 38C (100.4F), Mild Pain (1 - 3)  aluminum hydroxide/magnesium hydroxide/simethicone Suspension 30 milliLiter(s) Oral every 4 hours PRN Dyspepsia  metoclopramide Injectable 10 milliGRAM(s) IV Push every 8 hours PRN nausea, vomiting  ondansetron Injectable 4 milliGRAM(s) IV Push every 6 hours PRN Nausea  simethicone 80 milliGRAM(s) Chew every 8 hours PRN Gas    PE      general comfortable     Neuro follow commands, squeezing my hand     neck no cvd     lungs clear     cv rrr     abdomen - distended, non tender, abdominal wall is ecchymotic     ext warm       ICU Vital Signs Last 24 Hrs  T(C): 36.9 (27 May 2019 16:52), Max: 36.9 (26 May 2019 20:41)  T(F): 98.4 (27 May 2019 16:52), Max: 98.4 (26 May 2019 20:41)  HR: 87 (27 May 2019 16:52) (83 - 92)  BP: 136/78 (27 May 2019 16:52) (113/67 - 136/78)  BP(mean): --  ABP: --  ABP(mean): --  RR: 18 (27 May 2019 16:52) (17 - 18)  SpO2: 94% (27 May 2019 16:52) (94% - 99%)    I&O's Summary      05-26    141  |  103  |  20  ----------------------------<  108<H>  4.2   |  29  |  1.25    Ca    8.8      26 May 2019 07:15    TPro  6.5  /  Alb  3.1<L>  /  TBili  0.6  /  DBili  0.2  /  AST  27  /  ALT  22  /  AlkPhos  62  05-26      DVT Prophylaxis:    Full code                                                             Advanced Directives: heaprin subq

## 2019-05-28 LAB
ANION GAP SERPL CALC-SCNC: 10 MMOL/L — SIGNIFICANT CHANGE UP (ref 5–17)
BUN SERPL-MCNC: 27 MG/DL — HIGH (ref 7–23)
CALCIUM SERPL-MCNC: 7.9 MG/DL — LOW (ref 8.5–10.1)
CALPROTECTIN STL-MCNT: 33 UG/G — SIGNIFICANT CHANGE UP (ref 0–120)
CGA FLD-MCNC: 145 NG/ML — HIGH
CHLORIDE SERPL-SCNC: 106 MMOL/L — SIGNIFICANT CHANGE UP (ref 96–108)
CO2 SERPL-SCNC: 23 MMOL/L — SIGNIFICANT CHANGE UP (ref 22–31)
CREAT SERPL-MCNC: 1.32 MG/DL — HIGH (ref 0.5–1.3)
GASTRIN SERPL-MCNC: 16 PG/ML — SIGNIFICANT CHANGE UP
GLUCOSE SERPL-MCNC: 109 MG/DL — HIGH (ref 70–99)
HCT VFR BLD CALC: 35.2 % — LOW (ref 39–50)
HGB BLD-MCNC: 11.3 G/DL — LOW (ref 13–17)
MAGNESIUM SERPL-MCNC: 1.7 MG/DL — SIGNIFICANT CHANGE UP (ref 1.6–2.6)
MCHC RBC-ENTMCNC: 28.4 PG — SIGNIFICANT CHANGE UP (ref 27–34)
MCHC RBC-ENTMCNC: 32.1 GM/DL — SIGNIFICANT CHANGE UP (ref 32–36)
MCV RBC AUTO: 88.4 FL — SIGNIFICANT CHANGE UP (ref 80–100)
PLATELET # BLD AUTO: 218 K/UL — SIGNIFICANT CHANGE UP (ref 150–400)
POTASSIUM SERPL-MCNC: 3.2 MMOL/L — LOW (ref 3.5–5.3)
POTASSIUM SERPL-SCNC: 3.2 MMOL/L — LOW (ref 3.5–5.3)
RBC # BLD: 3.98 M/UL — LOW (ref 4.2–5.8)
RBC # FLD: 13.7 % — SIGNIFICANT CHANGE UP (ref 10.3–14.5)
SODIUM SERPL-SCNC: 139 MMOL/L — SIGNIFICANT CHANGE UP (ref 135–145)
TROPONIN I SERPL-MCNC: 0.28 NG/ML — HIGH (ref 0.01–0.04)
TTG IGA SER-ACNC: <1.2 U/ML — SIGNIFICANT CHANGE UP
TTG IGA SER-ACNC: NEGATIVE — SIGNIFICANT CHANGE UP
TTG IGG SER IA-ACNC: NEGATIVE — SIGNIFICANT CHANGE UP
TTG IGG SER-ACNC: <1.2 U/ML — SIGNIFICANT CHANGE UP
WBC # BLD: 13.54 K/UL — HIGH (ref 3.8–10.5)
WBC # FLD AUTO: 13.54 K/UL — HIGH (ref 3.8–10.5)

## 2019-05-28 PROCEDURE — 99232 SBSQ HOSP IP/OBS MODERATE 35: CPT

## 2019-05-28 PROCEDURE — 93306 TTE W/DOPPLER COMPLETE: CPT | Mod: 26

## 2019-05-28 PROCEDURE — 71045 X-RAY EXAM CHEST 1 VIEW: CPT | Mod: 26

## 2019-05-28 PROCEDURE — 93010 ELECTROCARDIOGRAM REPORT: CPT

## 2019-05-28 RX ORDER — PROCHLORPERAZINE MALEATE 5 MG
5 TABLET ORAL EVERY 4 HOURS
Refills: 0 | Status: DISCONTINUED | OUTPATIENT
Start: 2019-05-28 | End: 2019-06-11

## 2019-05-28 RX ORDER — POTASSIUM CHLORIDE 20 MEQ
10 PACKET (EA) ORAL
Refills: 0 | Status: COMPLETED | OUTPATIENT
Start: 2019-05-28 | End: 2019-05-28

## 2019-05-28 RX ORDER — CHLORHEXIDINE GLUCONATE 213 G/1000ML
15 SOLUTION TOPICAL
Refills: 0 | Status: DISCONTINUED | OUTPATIENT
Start: 2019-05-28 | End: 2019-05-29

## 2019-05-28 RX ORDER — NYSTATIN CREAM 100000 [USP'U]/G
1 CREAM TOPICAL
Refills: 0 | Status: DISCONTINUED | OUTPATIENT
Start: 2019-05-28 | End: 2019-06-11

## 2019-05-28 RX ORDER — FAMOTIDINE 10 MG/ML
20 INJECTION INTRAVENOUS
Refills: 0 | Status: DISCONTINUED | OUTPATIENT
Start: 2019-05-28 | End: 2019-06-11

## 2019-05-28 RX ADMIN — Medication 100 MILLIEQUIVALENT(S): at 10:02

## 2019-05-28 RX ADMIN — NYSTATIN CREAM 1 APPLICATION(S): 100000 CREAM TOPICAL at 18:01

## 2019-05-28 RX ADMIN — Medication 1 TABLET(S): at 11:05

## 2019-05-28 RX ADMIN — Medication 100 MILLIEQUIVALENT(S): at 12:51

## 2019-05-28 RX ADMIN — PIPERACILLIN AND TAZOBACTAM 25 GRAM(S): 4; .5 INJECTION, POWDER, LYOPHILIZED, FOR SOLUTION INTRAVENOUS at 13:00

## 2019-05-28 RX ADMIN — Medication 325 MILLIGRAM(S): at 11:05

## 2019-05-28 RX ADMIN — Medication 2 MILLIGRAM(S): at 21:28

## 2019-05-28 RX ADMIN — Medication 2 MILLIGRAM(S): at 05:35

## 2019-05-28 RX ADMIN — HEPARIN SODIUM 5000 UNIT(S): 5000 INJECTION INTRAVENOUS; SUBCUTANEOUS at 05:34

## 2019-05-28 RX ADMIN — PIPERACILLIN AND TAZOBACTAM 25 GRAM(S): 4; .5 INJECTION, POWDER, LYOPHILIZED, FOR SOLUTION INTRAVENOUS at 21:27

## 2019-05-28 RX ADMIN — HEPARIN SODIUM 5000 UNIT(S): 5000 INJECTION INTRAVENOUS; SUBCUTANEOUS at 21:27

## 2019-05-28 RX ADMIN — PIPERACILLIN AND TAZOBACTAM 25 GRAM(S): 4; .5 INJECTION, POWDER, LYOPHILIZED, FOR SOLUTION INTRAVENOUS at 05:34

## 2019-05-28 RX ADMIN — HEPARIN SODIUM 5000 UNIT(S): 5000 INJECTION INTRAVENOUS; SUBCUTANEOUS at 13:00

## 2019-05-28 RX ADMIN — ATORVASTATIN CALCIUM 20 MILLIGRAM(S): 80 TABLET, FILM COATED ORAL at 21:27

## 2019-05-28 RX ADMIN — FAMOTIDINE 20 MILLIGRAM(S): 10 INJECTION INTRAVENOUS at 18:01

## 2019-05-28 RX ADMIN — CHLORHEXIDINE GLUCONATE 15 MILLILITER(S): 213 SOLUTION TOPICAL at 17:31

## 2019-05-28 RX ADMIN — SODIUM CHLORIDE 100 MILLILITER(S): 9 INJECTION INTRAMUSCULAR; INTRAVENOUS; SUBCUTANEOUS at 06:09

## 2019-05-28 RX ADMIN — Medication 100 MILLIEQUIVALENT(S): at 11:04

## 2019-05-28 NOTE — CONSULT NOTE ADULT - ASSESSMENT
86 M with CAD, HTN CHol  with resp arrest req intubation after vomiting and likely aspiration      CE are minimally elevated - check serial enzymes  check echocardiogram    supportive care with pressors as needed to maintain MAP>60    no urgent need for angiogram at this time    will follow

## 2019-05-28 NOTE — CONSULT NOTE ADULT - SUBJECTIVE AND OBJECTIVE BOX
CHIEF COMPLAINT: Patient is a 86y old  Male who presents with a chief complaint of diarrhea (27 May 2019 20:09)      HPI:  87yo/M with PMH CAD s/p CABG in 2005, HTN, hyperlipidemia, OA s/p RT TKR, presented for evaluation of diarrhea. Patient states he had symptoms for almost a month, had been seen by GI as outpatient and had cdiff done about a week ago that was negative. He continues to have diarrhea now associated with urinary incontinence at time and dysuria. Patient was seen by DR Key as outpatient and supposed to have cystoscopy with biopsy last week but cancelled 2 to not feeling well with diarrhea and feeling weak. He denies chest pain or SOB. No fever or chills. Feels abdomen is distended and uncomfortable (21 May 2019 16:24)       aspirated / vomited -resp arrest now imtubated in ICU    PMHx: PAST MEDICAL & SURGICAL HISTORY:  HLD (hyperlipidemia)  HTN (hypertension)  Osteoarthrosis  Hyperlipidemia  Hypertension  CAD (coronary artery disease)  H/O total knee replacement, right  History of cholecystectomy  S/P CABG x 5        Soc Hx:  no toxic habits      Allergies: Allergies    No Known Allergies    Intolerances          REVIEW OF SYSTEMS:    CONSTITUTIONAL: No weakness, fevers or chills  EYES/ENT: No visual changes;  No vertigo or throat pain   NECK: No pain or stiffness  RESPIRATORY: No cough, wheezing, hemoptysis; No shortness of breath  CARDIOVASCULAR: No chest pain or palpitations  GASTROINTESTINAL: No abdominal or epigastric pain. No nausea, vomiting, or hematemesis; No diarrhea or constipation. No melena or hematochezia.  GENITOURINARY: No dysuria, frequency or hematuria  NEUROLOGICAL: No numbness or weakness  SKIN: No itching, burning, rashes, or lesions   All other review of systems is negative unless indicated above    Vital Signs Last 24 Hrs  T(C): 37.5 (28 May 2019 06:00), Max: 37.5 (27 May 2019 20:30)  T(F): 99.5 (28 May 2019 06:00), Max: 99.5 (27 May 2019 20:30)  HR: 94 (28 May 2019 07:00) (85 - 108)  BP: 98/58 (28 May 2019 07:00) (53/37 - 136/78)  BP(mean): 71 (28 May 2019 07:00) (43 - 85)  RR: 16 (28 May 2019 07:00) (15 - 25)  SpO2: 95% (28 May 2019 07:00) (94% - 100%)    I&O's Summary    27 May 2019 07:01  -  28 May 2019 07:00  --------------------------------------------------------  IN: 3112.6 mL / OUT: 1400 mL / NET: 1712.6 mL            PHYSICAL EXAM:   Constitutional: NAD, awake and alert, well-developed  HEENT: PERR, EOMI, Normal Hearing, MMM  Neck: Soft and supple, No LAD, No JVD  Respiratory: Breath sounds are clear bilaterally, No wheezing, rales or rhonchi  Cardiovascular: S1 and S2, regular rate and rhythm, no Murmurs, gallops or rubs  Gastrointestinal: Bowel Sounds present, soft, nontender, nondistended, no guarding, no rebound  Extremities: No peripheral edema  Vascular: 2+ peripheral pulses  Neurological: A/O x 3, no focal deficits  Musculoskeletal: 5/5 strength b/l upper and lower extremities  Skin: No rashes    MEDICATIONS:  MEDICATIONS  (STANDING):  aspirin 325 milliGRAM(s) Oral daily  atorvastatin 20 milliGRAM(s) Oral at bedtime  heparin  Injectable 5000 Unit(s) SubCutaneous every 8 hours  melatonin 5 milliGRAM(s) Oral at bedtime  multivitamin 1 Tablet(s) Oral daily  phenylephrine    Infusion 1 MICROgram(s)/kG/Min (36.487 mL/Hr) IV Continuous <Continuous>  piperacillin/tazobactam IVPB. 3.375 Gram(s) IV Intermittent every 8 hours  sodium chloride 0.9%. 1000 milliLiter(s) (100 mL/Hr) IV Continuous <Continuous>      LABS: All Labs Reviewed:                        10.8   8.62  )-----------( 281      ( 27 May 2019 20:07 )             34.9     05-27    139  |  105  |  25<H>  ----------------------------<  206<H>  3.7   |  23  |  1.52<H>    Ca    7.8<L>      27 May 2019 20:07        CARDIAC MARKERS ( 27 May 2019 20:07 )  0.051 ng/mL / x     / x     / x     / x            Blood Culture: Organism --  Gram Stain Blood -- Gram Stain --  Specimen Source .Stool Feces palomo damian  Culture-Blood --        EKG: ST, RBBB, LPFB, possible inferior infarct, no signficiat ST changes     Telemetry:    ECHO:- pending

## 2019-05-28 NOTE — PROGRESS NOTE ADULT - ASSESSMENT
1. Patient admitted with abdominal pain distention, hx of coronary artery disease      now with cardiac arrest, ? likely aspiration, no acute ekg changes       Plan:  ICU    PULM: Hypoxic respiratory failure likely from aspiration.  Worsening infiltrate on the Left.  Continue Zosyn    Cardio: S/P Cardiac arrest-Likely related to aspiration and hypoxia.  Seen by cardio    Renal: Was in [place] likely from ATN and is now improving  Replace K+ and IVF    GI: NPO, keep to LWS, colonoscopy on hold    SQH DVT prophylaxis

## 2019-05-28 NOTE — PROGRESS NOTE ADULT - SUBJECTIVE AND OBJECTIVE BOX
HPI:      This patient is an 86 year old male with hx. of CAD, s/p CABG about 5 years ago who presented with weakness and persistent diarrhea      CT of abdomen and Pelvis showed ? early sbo, thickened colon and Pelvic inflammation. The patient was receiving a bowel prep which      he was having some difficulty taking. He acutely developed respiratory distress, ? vomiting ? aspiration and than had cardiac arrest      Code Blue was called.  On arrival cpr was started , patient was emergently intubated. Patient received 1 epinephrine, approximately 5 minutes of      cpr. Then ROSC was obtained. Patient did awake and follow commands after arrest, OG tube was placed. ekg no acute st changes.      Focused echo, good lv function, not on pressors      5/28: Patient following commands, Worsening L Infiltrate on CXR, Weaned for a while this morning         PAST MEDICAL & SURGICAL HISTORY:  HLD (hyperlipidemia)  HTN (hypertension)  Osteoarthrosis  Hyperlipidemia  Hypertension  CAD (coronary artery disease)  H/O total knee replacement, right  History of cholecystectomy  S/P CABG x 5      FAMILY HISTORY:  Family history of premature CAD      Social Hx:    Allergies    No Known Allergies    Intolerances            ICU Vital Signs Last 24 Hrs  T(C): 38.2 (28 May 2019 08:00), Max: 38.2 (28 May 2019 08:00)  T(F): 100.8 (28 May 2019 08:00), Max: 100.8 (28 May 2019 08:00)  HR: 89 (28 May 2019 11:46) (85 - 108)  BP: 99/47 (28 May 2019 10:00) (53/37 - 136/78)  BP(mean): 63 (28 May 2019 10:00) (43 - 85)  ABP: --  ABP(mean): --  RR: 24 (28 May 2019 10:00) (15 - 26)  SpO2: 97% (28 May 2019 11:46) (94% - 100%)      Mode: AC/ CMV (Assist Control/ Continuous Mandatory Ventilation)  RR (machine): 14  TV (machine): 500  FiO2: 50  PEEP: 5  ITime: 1  MAP: 9.3  PIP: 20      I&O's Summary    27 May 2019 07:01  -  28 May 2019 07:00  --------------------------------------------------------  IN: 3112.6 mL / OUT: 1400 mL / NET: 1712.6 mL                              11.3   13.54 )-----------( 218      ( 28 May 2019 06:24 )             35.2       05-28    139  |  106  |  27<H>  ----------------------------<  109<H>  3.2<L>   |  23  |  1.32<H>    Ca    7.9<L>      28 May 2019 06:24  Mg     1.7     05-28        CARDIAC MARKERS ( 28 May 2019 06:24 )  0.280 ng/mL / x     / x     / x     / x      CARDIAC MARKERS ( 27 May 2019 20:07 )  0.051 ng/mL / x     / x     / x     / x            ABG - ( 27 May 2019 20:06 )  pH, Arterial: 7.29  pH, Blood: x     /  pCO2: 41    /  pO2: 86    / HCO3: 19    / Base Excess: -6.4  /  SaO2: 94                      MEDICATIONS  (STANDING):  aspirin 325 milliGRAM(s) Oral daily  atorvastatin 20 milliGRAM(s) Oral at bedtime  chlorhexidine 0.12% Liquid 15 milliLiter(s) Oral Mucosa two times a day  famotidine    Tablet 20 milliGRAM(s) Oral two times a day  heparin  Injectable 5000 Unit(s) SubCutaneous every 8 hours  melatonin 5 milliGRAM(s) Oral at bedtime  multivitamin 1 Tablet(s) Oral daily  nystatin Powder 1 Application(s) Topical two times a day  phenylephrine    Infusion 1 MICROgram(s)/kG/Min (36.487 mL/Hr) IV Continuous <Continuous>  piperacillin/tazobactam IVPB. 3.375 Gram(s) IV Intermittent every 8 hours  sodium chloride 0.9%. 1000 milliLiter(s) (100 mL/Hr) IV Continuous <Continuous>    MEDICATIONS  (PRN):  acetaminophen   Tablet .. 650 milliGRAM(s) Oral every 6 hours PRN Temp greater or equal to 38C (100.4F), Mild Pain (1 - 3)  aluminum hydroxide/magnesium hydroxide/simethicone Suspension 30 milliLiter(s) Oral every 4 hours PRN Dyspepsia  LORazepam   Injectable 2 milliGRAM(s) IV Push every 2 hours PRN Agitation  prochlorperazine   Injectable 5 milliGRAM(s) IV Push every 4 hours PRN nausea  simethicone 80 milliGRAM(s) Chew every 8 hours PRN Gas      DVT Prophylaxis: SQH    Advanced Directives:  Discussed with:    Visit Information: 30 min    ** Time is exclusive of billed procedures and/or teaching and/or routine family updates.

## 2019-05-28 NOTE — PROGRESS NOTE ADULT - ASSESSMENT
87yo/M with PMH CAD s/p CABG in 2005, HTN, hyperlipidemia, OA s/p RT TKR, admitted on 5/21 for evaluation of 4 weeks diarrhea that started as pasty stool but progressed to liquid watery stool. No prior antibiotics, no recent travel or food ingestion. No sick contacts and notes improvement in diarrhea since starting antibiotics. No abdominal pain and no fever. No nausea or vomiting.  1. Patient admitted with diarrhea, found to have pan colitis, infectious versus inflammatory  - patient evaluation complicated by aspiration while preparing bowel prep, then cardiac arrest, intubation  - day #5 zosyn for colitis but will cover for aspiration pneumonia as well  - tolerating antibiotics without rashes or side effects   - weaning of ventilatory support per icu  - to have urology evaluation given imaging studies  2. other issues; per medicine

## 2019-05-28 NOTE — PROGRESS NOTE ADULT - ASSESSMENT
85yo male with colitis, ?from ulcerative colitis  colonoscopy had been planned but likely aspiration leading to respiratory arrest and brief code  will hold off on any procedure until more stable  would consider empiric steroids when improved depending on status of pneumonia  would check flex sig rather than colonoscopy as should be adequate visualization to get biopsies

## 2019-05-28 NOTE — PROGRESS NOTE ADULT - SUBJECTIVE AND OBJECTIVE BOX
Patient is a 86y old  Male who presents with a chief complaint of diarrhea (28 May 2019 09:46)      HPI:  overnight events noted. He coded last PM possibly due to aspiration.  He remains on the ventilator and is currently stable. He was waking up but plan to keep on vent given worsening left-sided pneumonia.   I spoke with family in detail    PAST MEDICAL & SURGICAL HISTORY:  HLD (hyperlipidemia)  HTN (hypertension)  Osteoarthrosis  Hyperlipidemia  Hypertension  CAD (coronary artery disease)  H/O total knee replacement, right  History of cholecystectomy  S/P CABG x 5    MEDICATIONS  (STANDING):  aspirin 325 milliGRAM(s) Oral daily  atorvastatin 20 milliGRAM(s) Oral at bedtime  chlorhexidine 0.12% Liquid 15 milliLiter(s) Oral Mucosa two times a day  famotidine    Tablet 20 milliGRAM(s) Oral two times a day  heparin  Injectable 5000 Unit(s) SubCutaneous every 8 hours  melatonin 5 milliGRAM(s) Oral at bedtime  multivitamin 1 Tablet(s) Oral daily  nystatin Powder 1 Application(s) Topical two times a day  phenylephrine    Infusion 1 MICROgram(s)/kG/Min (36.487 mL/Hr) IV Continuous <Continuous>  piperacillin/tazobactam IVPB. 3.375 Gram(s) IV Intermittent every 8 hours  potassium chloride  10 mEq/100 mL IVPB 10 milliEquivalent(s) IV Intermittent every 1 hour  sodium chloride 0.9%. 1000 milliLiter(s) (100 mL/Hr) IV Continuous <Continuous>    MEDICATIONS  (PRN):  acetaminophen   Tablet .. 650 milliGRAM(s) Oral every 6 hours PRN Temp greater or equal to 38C (100.4F), Mild Pain (1 - 3)  aluminum hydroxide/magnesium hydroxide/simethicone Suspension 30 milliLiter(s) Oral every 4 hours PRN Dyspepsia  LORazepam   Injectable 2 milliGRAM(s) IV Push every 2 hours PRN Agitation  prochlorperazine   Injectable 5 milliGRAM(s) IV Push every 4 hours PRN nausea  simethicone 80 milliGRAM(s) Chew every 8 hours PRN Gas    Allergies  No Known Allergies    REVIEW OF SYSTEMS:  not obtained as on vent    Vital Signs Last 24 Hrs  T(C): 38.2 (28 May 2019 08:00), Max: 38.2 (28 May 2019 08:00)  T(F): 100.8 (28 May 2019 08:00), Max: 100.8 (28 May 2019 08:00)  HR: 89 (28 May 2019 11:46) (85 - 108)  BP: 99/47 (28 May 2019 10:00) (53/37 - 136/78)  BP(mean): 63 (28 May 2019 10:00) (43 - 85)  RR: 24 (28 May 2019 10:00) (15 - 26)  SpO2: 97% (28 May 2019 11:46) (94% - 100%)    PHYSICAL EXAM:    Constitutional: on vent  Respiratory: scattered rhonchi on left  Cardiovascular: S1 and S2  Gastrointestinal: BS+, softly distended      LABS:                        11.3   13.54 )-----------( 218      ( 28 May 2019 06:24 )             35.2     05-28    139  |  106  |  27<H>  ----------------------------<  109<H>  3.2<L>   |  23  |  1.32<H>    Ca    7.9<L>      28 May 2019 06:24  Mg     1.7     05-28            RADIOLOGY & ADDITIONAL STUDIES:

## 2019-05-28 NOTE — PROGRESS NOTE ADULT - SUBJECTIVE AND OBJECTIVE BOX
Patient is a 86y old  Male who presents with a chief complaint of diarrhea (22 May 2019 07:38)    Date of service: 05-28-19 @ 09:47      Patient events noted; currently intubated, had most likely aspirated, subsequent cardiac arrest      ROS unable to obtain secondary to patient medical condition     MEDICATIONS  (STANDING):  aspirin 325 milliGRAM(s) Oral daily  atorvastatin 20 milliGRAM(s) Oral at bedtime  chlorhexidine 0.12% Liquid 15 milliLiter(s) Oral Mucosa two times a day  famotidine    Tablet 20 milliGRAM(s) Oral two times a day  heparin  Injectable 5000 Unit(s) SubCutaneous every 8 hours  melatonin 5 milliGRAM(s) Oral at bedtime  multivitamin 1 Tablet(s) Oral daily  nystatin Powder 1 Application(s) Topical two times a day  phenylephrine    Infusion 1 MICROgram(s)/kG/Min (36.487 mL/Hr) IV Continuous <Continuous>  piperacillin/tazobactam IVPB. 3.375 Gram(s) IV Intermittent every 8 hours  potassium chloride  10 mEq/100 mL IVPB 10 milliEquivalent(s) IV Intermittent every 1 hour  sodium chloride 0.9%. 1000 milliLiter(s) (100 mL/Hr) IV Continuous <Continuous>    MEDICATIONS  (PRN):  acetaminophen   Tablet .. 650 milliGRAM(s) Oral every 6 hours PRN Temp greater or equal to 38C (100.4F), Mild Pain (1 - 3)  aluminum hydroxide/magnesium hydroxide/simethicone Suspension 30 milliLiter(s) Oral every 4 hours PRN Dyspepsia  LORazepam   Injectable 2 milliGRAM(s) IV Push every 2 hours PRN Agitation  prochlorperazine   Injectable 5 milliGRAM(s) IV Push every 4 hours PRN nausea  simethicone 80 milliGRAM(s) Chew every 8 hours PRN Gas      Vital Signs Last 24 Hrs  T(C): 38.2 (28 May 2019 08:00), Max: 38.2 (28 May 2019 08:00)  T(F): 100.8 (28 May 2019 08:00), Max: 100.8 (28 May 2019 08:00)  HR: 94 (28 May 2019 09:00) (85 - 108)  BP: 88/54 (28 May 2019 09:00) (53/37 - 136/78)  BP(mean): 65 (28 May 2019 09:00) (43 - 85)  RR: 20 (28 May 2019 09:00) (15 - 26)  SpO2: 97% (28 May 2019 09:00) (94% - 100%)    Physical Exam:        PE:    Constitutional: frail looking  HEENT: NC/AT, EOMI, orally intubated  Neck: supple; thyroid not palpable  Back: no tenderness  Respiratory: respiratory effort normal; clear to auscultation  Cardiovascular: S1S2 regular, no murmurs  Abdomen: soft, not tender, not distended, positive BS; no liver or spleen organomegaly  Genitourinary: no suprapubic tenderness  Musculoskeletal: no muscle tenderness, no joint swelling or tenderness  Neurological/ Psychiatric:   moving all extremities  Skin: no rashes; no palpable lesions    Labs: all available labs reviewed                         Labs:                   Labs:                        11.3   13.54 )-----------( 218      ( 28 May 2019 06:24 )             35.2     05-28    139  |  106  |  27<H>  ----------------------------<  109<H>  3.2<L>   |  23  |  1.32<H>    Ca    7.9<L>      28 May 2019 06:24  Mg     1.7     05-28             Cultures:       GI PCR Panel, Stool (collected 05-23-19 @ 08:20)  Source: .Stool Feces palomo salgado  Final Report (05-23-19 @ 14:02):    GI PCR Results: NOT detected    *******Please Note:*******    GI panel PCR evaluates for:    Campylobacter, Plesiomonas shigelloides, Salmonella,    Vibrio, Yersinia enterocolitica, Enteroaggregative    Escherichia coli (EAEC), Enteropathogenic E.coli (EPEC),    Enterotoxigenic E. coli (ETEC) lt/st, Shiga-like    toxin-producing E. coli (STEC) stx1/stx2,    Shigella/ Enteroinvasive E. coli (EIEC), Cryptosporidium,    Cyclospora cayetanensis, Entamoeba histolytica,    Giardia lamblia, Adenovirus F 40/41, Astrovirus,    Norovirus GI/GII, Rotavirus A, Sapovirus    Culture - Blood (collected 05-21-19 @ 17:26)  Source: .Blood None  Final Report (05-27-19 @ 01:01):    No growth at 5 days.    Culture - Blood (collected 05-21-19 @ 17:26)  Source: .Blood None  Final Report (05-27-19 @ 01:01):    No growth at 5 days.    Culture - Urine (collected 05-21-19 @ 16:52)  Source: .Urine Clean Catch (Midstream)  Final Report (05-23-19 @ 00:44):    No growth                  Clostridium difficile Toxin by PCR (05.21.19 @ 20:40)    Clostridium difficile Toxin by PCR: The results of this test should be interpreted with consideration of all  clinical and laboratory findings. This test determines the presence of  the C. difficile tcdB gene at a given time and is not intended to  identify antibiotic associated disease or C. difficile infection without  clinical context.  Successful treatment is based on the resolution of clinical symptoms.  This test should not be used as a "test of cure" because C. difficile DNA  will persist after successful treatment. Repeat testing will not be  permitted.    This test is performed on the BD MAX system using Real-Time PCR and  fluorogenic target-specific hybridization.    C Diff by PCR Result: NotDetec        < from: CT Abdomen and Pelvis w/ Oral Cont and w/ IV Cont (05.24.19 @ 12:53) >    EXAM:  CT ABDOMEN AND PELVIS OC IC                            PROCEDURE DATE:  05/24/2019          INTERPRETATION:  CLINICAL INFORMATION: Colitis with worsening abdominal   distention    COMPARISON: CT scan of the abdomen and pelvis from May 21, 2019    PROCEDURE:   CT of the Abdomen and Pelvis was performed with intravenous contrast.   Intravenous contrast: 90 ml Omnipaque 350. 10 ml discarded.  Oral contrast: None.  Sagittal and coronal reformats were performed.    FINDINGS:    LOWER CHEST: There is interval development of small bilateral pleural   effusions with adjacent atelectasis. Patient is status post sternotomy   and CABG.    LIVER: Within normal limits.  BILE DUCTS: Normal caliber.  GALLBLADDER: Cholecystectomy clips are present increased artifact in the   surrounding region.  SPLEEN: Within normal limits.  PANCREAS: There are a few calcifications within the tail of the pancreas.   Chronic pancreatitis cannot be excluded. Please correlate clinically.  ADRENALS: Within normal limits.  KIDNEYS/URETERS: Right renal cyst measuring up to 2.1 cm in the upper   pole laterally. Bilateral mild-to-moderate hydronephrosis and mild   hydroureter to the level of the pelvis. This is unchanged.    BLADDER: Again noted is marked asymmetric wall thickening of the right   side of the bladder extending anteriorly and posteriorly. Underlying   neoplastic process cannot be excluded.  REPRODUCTIVE ORGANS: Small calcifications in the prostate gland.   Infiltrative soft tissue changes in the presacral and perirectal region   are unchanged.    BOWEL: There is no bowel obstruction. Again noted is wall thickening   throughout the colon which is mild in appearance. There are regions of   underdistended sigmoid colon which limit evaluation. Appendix not well   visualized. No focal inflammatory changes are seen. There is mild   nodularity and wall thickening in the gastric antrum. Underlying neoplasm   cannot be excluded.  PERITONEUM: Mild ascites has increased. There is mild soft tissue   stranding in the left lateral pelvis which is unchanged. Bilateral   inguinal hernias containing fat are present.  VESSELS:  There are vascular calcifications. There is mild dilatation of   the distal abdominal aorta measuring up to approximately 2.5 cm when   compared with 2.0 cm more superiorly.  RETROPERITONEUM: No lymphadenopathy.    ABDOMINAL WALL: There is mild subcutaneous edema.  BONES: Degenerative changes of bone are present.    IMPRESSION:     Mild wall thickening of the colon raising concern for colitis. This does   not appear significantly changed. Suggestion of wall thickening with mild   nodularity in the gastric antrum. Underlying neoplasm cannot be excluded.    Progression of mild ascites and interval development of small bilateral   pleural effusions. Presacral and perirectal soft tissue stranding and   soft tissue stranding in the left lateral pelvis are unchanged when   compared with the prior study.    Asymmetric wall thickening of the right side of the bladder extending   posteriorly and anteriorly. Underlying neoplastic process, be excluded.   Bilateral mild to moderate hydronephrosis and mild hydroureter which may   be related to the bladder process. Please correlate clinically.    Additional findings as above.        < end of copied text >                < from: CT Abdomen and Pelvis w/ Oral Cont and w/ IV Cont (05.21.19 @ 14:15) >    EXAM:  CT ABDOMEN AND PELVIS OC IC                            PROCEDURE DATE:  05/21/2019          INTERPRETATION:  DATE: 5/21/2019 2:15 PM  COMPARISON: None  CLINICAL INFORMATION: Diarrhea, abdominal distention  PROCEDURE:  CT of the Abdomen and Pelvis was performed withintravenous   contrast.  90 ml of Omnipaque 350 was injected intravenously. Oral   contrast was administered        FINDINGS:    LOWER CHEST: Coronary vascular calcification. Trace right pleural   effusion with minimal bibasilar atelectasis.    ABDOMEN AND PELVIS:    LIVER: within normal limits.  BILE DUCTS: Minimal biliary dilatation.  GALLBLADDER: Prior cholecystectomy.  PANCREAS: within normal limits.  SPLEEN: within normal limits.    ADRENALS: within normal limits.  KIDNEYS, URETERS AND BLADDER: Kidneys enhance bilaterally and   symmetrically. Bilateral chronic UPJ obstructions. Bilateral parapelvic   cysts. Exophytic anterior right upper pole renal cyst. Additional   subcentimeter hypodensities bilaterally to smallto characterize. No   intrarenal calculi. Ureters unremarkable. Marked thickening of the right   lateral and anterior bladder wall.    REPRODUCTIVE ORGANS: Extensive infiltrative changes in the pelvis and   presacral region with additional small ascites. Infiltrative changes   along the bilateral pelvic sidewall.    BOWEL: Pancolonic thickening with pericolonic infiltrative changes   compatible with pancolitis, differential including infectious versus   inflammatory etiology with consideration for C. Difficile infection.   Mildly prominent small bowel loops without evidence of obstruction.   Normal appendix. Thickening along the gastric antrum, may represent   gastritis however recommend direct visualization to exclude mucosal   abnormality.    PERITONEUM: no trace to small ascites. Extensive pelvic infiltrative   changes. No free air. No discrete drainable fluid collections.    VESSELS: Atherosclerotic changes .  RETROPERITONEUM: Within normal limits.      ABDOMINAL WALL: within normal limits.  BONES: Degenerative changes and osteopenia.    IMPRESSION:      Pancolonic thickening with pericolonic infiltrative changes compatible   with pancolitis, differential including infectious versus inflammatory   etiology with consideration for C. Difficile infection. Mildly prominent   small bowel loops without evidence of obstruction    Thickening along the gastric antrum, may represent gastritis however   recommend direct visualization to exclude mucosal abnormality.      Marked thickeningalong the right lateral and anterior bladder wall   recommend correlation with urinalysis as well as direct visualization to   exclude neoplasm.    Extensive infiltrate changes in the pelvic fat nonspecific in nature.        < end of copied text >          Radiology: all available radiological tests reviewed    Advanced directives addressed: full resuscitation

## 2019-05-29 LAB
ANION GAP SERPL CALC-SCNC: 10 MMOL/L — SIGNIFICANT CHANGE UP (ref 5–17)
BUN SERPL-MCNC: 25 MG/DL — HIGH (ref 7–23)
CALCIUM SERPL-MCNC: 8.7 MG/DL — SIGNIFICANT CHANGE UP (ref 8.5–10.1)
CHLORIDE SERPL-SCNC: 108 MMOL/L — SIGNIFICANT CHANGE UP (ref 96–108)
CO2 SERPL-SCNC: 23 MMOL/L — SIGNIFICANT CHANGE UP (ref 22–31)
CREAT SERPL-MCNC: 1.17 MG/DL — SIGNIFICANT CHANGE UP (ref 0.5–1.3)
GLUCOSE SERPL-MCNC: 93 MG/DL — SIGNIFICANT CHANGE UP (ref 70–99)
HCT VFR BLD CALC: 36.8 % — LOW (ref 39–50)
HGB BLD-MCNC: 11.9 G/DL — LOW (ref 13–17)
MAGNESIUM SERPL-MCNC: 1.9 MG/DL — SIGNIFICANT CHANGE UP (ref 1.6–2.6)
MCHC RBC-ENTMCNC: 28.9 PG — SIGNIFICANT CHANGE UP (ref 27–34)
MCHC RBC-ENTMCNC: 32.3 GM/DL — SIGNIFICANT CHANGE UP (ref 32–36)
MCV RBC AUTO: 89.3 FL — SIGNIFICANT CHANGE UP (ref 80–100)
PHOSPHATE SERPL-MCNC: 2.8 MG/DL — SIGNIFICANT CHANGE UP (ref 2.5–4.5)
PLATELET # BLD AUTO: 144 K/UL — LOW (ref 150–400)
POTASSIUM SERPL-MCNC: 3.7 MMOL/L — SIGNIFICANT CHANGE UP (ref 3.5–5.3)
POTASSIUM SERPL-SCNC: 3.7 MMOL/L — SIGNIFICANT CHANGE UP (ref 3.5–5.3)
RBC # BLD: 4.12 M/UL — LOW (ref 4.2–5.8)
RBC # FLD: 14.2 % — SIGNIFICANT CHANGE UP (ref 10.3–14.5)
SODIUM SERPL-SCNC: 141 MMOL/L — SIGNIFICANT CHANGE UP (ref 135–145)
WBC # BLD: 10.14 K/UL — SIGNIFICANT CHANGE UP (ref 3.8–10.5)
WBC # FLD AUTO: 10.14 K/UL — SIGNIFICANT CHANGE UP (ref 3.8–10.5)

## 2019-05-29 PROCEDURE — 99231 SBSQ HOSP IP/OBS SF/LOW 25: CPT

## 2019-05-29 PROCEDURE — 71045 X-RAY EXAM CHEST 1 VIEW: CPT | Mod: 26

## 2019-05-29 RX ORDER — METOPROLOL TARTRATE 50 MG
25 TABLET ORAL
Refills: 0 | Status: DISCONTINUED | OUTPATIENT
Start: 2019-05-29 | End: 2019-05-29

## 2019-05-29 RX ORDER — METOPROLOL TARTRATE 50 MG
5 TABLET ORAL EVERY 6 HOURS
Refills: 0 | Status: DISCONTINUED | OUTPATIENT
Start: 2019-05-29 | End: 2019-06-01

## 2019-05-29 RX ADMIN — Medication 650 MILLIGRAM(S): at 14:36

## 2019-05-29 RX ADMIN — NYSTATIN CREAM 1 APPLICATION(S): 100000 CREAM TOPICAL at 17:11

## 2019-05-29 RX ADMIN — Medication 5 MILLIGRAM(S): at 17:10

## 2019-05-29 RX ADMIN — Medication 325 MILLIGRAM(S): at 14:36

## 2019-05-29 RX ADMIN — Medication 5 MILLIGRAM(S): at 12:39

## 2019-05-29 RX ADMIN — CHLORHEXIDINE GLUCONATE 15 MILLILITER(S): 213 SOLUTION TOPICAL at 05:36

## 2019-05-29 RX ADMIN — SODIUM CHLORIDE 100 MILLILITER(S): 9 INJECTION INTRAMUSCULAR; INTRAVENOUS; SUBCUTANEOUS at 06:39

## 2019-05-29 RX ADMIN — Medication 2 MILLIGRAM(S): at 03:32

## 2019-05-29 RX ADMIN — NYSTATIN CREAM 1 APPLICATION(S): 100000 CREAM TOPICAL at 05:35

## 2019-05-29 RX ADMIN — HEPARIN SODIUM 5000 UNIT(S): 5000 INJECTION INTRAVENOUS; SUBCUTANEOUS at 05:36

## 2019-05-29 RX ADMIN — FAMOTIDINE 20 MILLIGRAM(S): 10 INJECTION INTRAVENOUS at 17:11

## 2019-05-29 RX ADMIN — PIPERACILLIN AND TAZOBACTAM 25 GRAM(S): 4; .5 INJECTION, POWDER, LYOPHILIZED, FOR SOLUTION INTRAVENOUS at 21:36

## 2019-05-29 RX ADMIN — PIPERACILLIN AND TAZOBACTAM 25 GRAM(S): 4; .5 INJECTION, POWDER, LYOPHILIZED, FOR SOLUTION INTRAVENOUS at 05:35

## 2019-05-29 RX ADMIN — HEPARIN SODIUM 5000 UNIT(S): 5000 INJECTION INTRAVENOUS; SUBCUTANEOUS at 14:37

## 2019-05-29 RX ADMIN — HEPARIN SODIUM 5000 UNIT(S): 5000 INJECTION INTRAVENOUS; SUBCUTANEOUS at 21:36

## 2019-05-29 RX ADMIN — Medication 1 TABLET(S): at 14:36

## 2019-05-29 RX ADMIN — Medication 5 MILLIGRAM(S): at 23:13

## 2019-05-29 RX ADMIN — Medication 0.5 MILLIGRAM(S): at 22:34

## 2019-05-29 RX ADMIN — PIPERACILLIN AND TAZOBACTAM 25 GRAM(S): 4; .5 INJECTION, POWDER, LYOPHILIZED, FOR SOLUTION INTRAVENOUS at 14:36

## 2019-05-29 RX ADMIN — FAMOTIDINE 20 MILLIGRAM(S): 10 INJECTION INTRAVENOUS at 05:35

## 2019-05-29 NOTE — PROGRESS NOTE ADULT - SUBJECTIVE AND OBJECTIVE BOX
Patient is a 86y old  Male who presents with a chief complaint of diarrhea (22 May 2019 07:38)    Date of service: 05-29-19 @ 09:45    Patient awake, on ventilatory support   Afebrile,this am, had fever yesterday pm        ROS: unable to obtain secondary to patient medical condition     MEDICATIONS  (STANDING):  aspirin 325 milliGRAM(s) Oral daily  atorvastatin 20 milliGRAM(s) Oral at bedtime  chlorhexidine 0.12% Liquid 15 milliLiter(s) Oral Mucosa two times a day  famotidine    Tablet 20 milliGRAM(s) Oral two times a day  heparin  Injectable 5000 Unit(s) SubCutaneous every 8 hours  melatonin 5 milliGRAM(s) Oral at bedtime  metoprolol tartrate 25 milliGRAM(s) Oral two times a day  multivitamin 1 Tablet(s) Oral daily  nystatin Powder 1 Application(s) Topical two times a day  phenylephrine    Infusion 1 MICROgram(s)/kG/Min (36.487 mL/Hr) IV Continuous <Continuous>  piperacillin/tazobactam IVPB. 3.375 Gram(s) IV Intermittent every 8 hours  sodium chloride 0.9%. 1000 milliLiter(s) (100 mL/Hr) IV Continuous <Continuous>    MEDICATIONS  (PRN):  acetaminophen   Tablet .. 650 milliGRAM(s) Oral every 6 hours PRN Temp greater or equal to 38C (100.4F), Mild Pain (1 - 3)  aluminum hydroxide/magnesium hydroxide/simethicone Suspension 30 milliLiter(s) Oral every 4 hours PRN Dyspepsia  LORazepam   Injectable 2 milliGRAM(s) IV Push every 2 hours PRN Agitation  prochlorperazine   Injectable 5 milliGRAM(s) IV Push every 4 hours PRN nausea  simethicone 80 milliGRAM(s) Chew every 8 hours PRN Gas      Vital Signs Last 24 Hrs  T(C): 37.6 (29 May 2019 04:00), Max: 38.2 (28 May 2019 14:00)  T(F): 99.7 (29 May 2019 04:00), Max: 100.7 (28 May 2019 14:00)  HR: 108 (29 May 2019 08:00) (89 - 120)  BP: 132/66 (29 May 2019 08:00) (92/53 - 133/71)  BP(mean): 86 (29 May 2019 08:00) (62 - 93)  RR: 22 (29 May 2019 08:00) (12 - 26)  SpO2: 100% (29 May 2019 08:00) (95% - 100%)    Physical Exam:        PE:    Constitutional: frail looking  HEENT: NC/AT, EOMI, orally intubated  Neck: supple; thyroid not palpable  Back: no tenderness  Respiratory: respiratory effort normal; clear to auscultation  Cardiovascular: S1S2 regular, no murmurs  Abdomen: soft, not tender, not distended, positive BS; no liver or spleen organomegaly  Genitourinary: no suprapubic tenderness  Musculoskeletal: no muscle tenderness, no joint swelling or tenderness  Neurological/ Psychiatric:   moving all extremities  Skin: no rashes; no palpable lesions    Labs: all available labs reviewed                        Labs:                        11.9   10.14 )-----------( 144      ( 29 May 2019 06:29 )             36.8     05-29    141  |  108  |  25<H>  ----------------------------<  93  3.7   |  23  |  1.17    Ca    8.7      29 May 2019 06:29  Phos  2.8     05-29  Mg     1.9     05-29             Cultures:       GI PCR Panel, Stool (collected 05-23-19 @ 08:20)  Source: .Stool Feces palomo salgado  Final Report (05-23-19 @ 14:02):    GI PCR Results: NOT detected    *******Please Note:*******    GI panel PCR evaluates for:    Campylobacter, Plesiomonas shigelloides, Salmonella,    Vibrio, Yersinia enterocolitica, Enteroaggregative    Escherichia coli (EAEC), Enteropathogenic E.coli (EPEC),    Enterotoxigenic E. coli (ETEC) lt/st, Shiga-like    toxin-producing E. coli (STEC) stx1/stx2,    Shigella/ Enteroinvasive E. coli (EIEC), Cryptosporidium,    Cyclospora cayetanensis, Entamoeba histolytica,    Giardia lamblia, Adenovirus F 40/41, Astrovirus,    Norovirus GI/GII, Rotavirus A, Sapovirus              Clostridium difficile Toxin by PCR (05.21.19 @ 20:40)    Clostridium difficile Toxin by PCR: The results of this test should be interpreted with consideration of all  clinical and laboratory findings. This test determines the presence of  the C. difficile tcdB gene at a given time and is not intended to  identify antibiotic associated disease or C. difficile infection without  clinical context.  Successful treatment is based on the resolution of clinical symptoms.  This test should not be used as a "test of cure" because C. difficile DNA  will persist after successful treatment. Repeat testing will not be  permitted.    This test is performed on the BD MAX system using Real-Time PCR and  fluorogenic target-specific hybridization.    C Diff by PCR Result: NotDetec        < from: CT Abdomen and Pelvis w/ Oral Cont and w/ IV Cont (05.24.19 @ 12:53) >    EXAM:  CT ABDOMEN AND PELVIS OC IC                            PROCEDURE DATE:  05/24/2019          INTERPRETATION:  CLINICAL INFORMATION: Colitis with worsening abdominal   distention    COMPARISON: CT scan of the abdomen and pelvis from May 21, 2019    PROCEDURE:   CT of the Abdomen and Pelvis was performed with intravenous contrast.   Intravenous contrast: 90 ml Omnipaque 350. 10 ml discarded.  Oral contrast: None.  Sagittal and coronal reformats were performed.    FINDINGS:    LOWER CHEST: There is interval development of small bilateral pleural   effusions with adjacent atelectasis. Patient is status post sternotomy   and CABG.    LIVER: Within normal limits.  BILE DUCTS: Normal caliber.  GALLBLADDER: Cholecystectomy clips are present increased artifact in the   surrounding region.  SPLEEN: Within normal limits.  PANCREAS: There are a few calcifications within the tail of the pancreas.   Chronic pancreatitis cannot be excluded. Please correlate clinically.  ADRENALS: Within normal limits.  KIDNEYS/URETERS: Right renal cyst measuring up to 2.1 cm in the upper   pole laterally. Bilateral mild-to-moderate hydronephrosis and mild   hydroureter to the level of the pelvis. This is unchanged.    BLADDER: Again noted is marked asymmetric wall thickening of the right   side of the bladder extending anteriorly and posteriorly. Underlying   neoplastic process cannot be excluded.  REPRODUCTIVE ORGANS: Small calcifications in the prostate gland.   Infiltrative soft tissue changes in the presacral and perirectal region   are unchanged.    BOWEL: There is no bowel obstruction. Again noted is wall thickening   throughout the colon which is mild in appearance. There are regions of   underdistended sigmoid colon which limit evaluation. Appendix not well   visualized. No focal inflammatory changes are seen. There is mild   nodularity and wall thickening in the gastric antrum. Underlying neoplasm   cannot be excluded.  PERITONEUM: Mild ascites has increased. There is mild soft tissue   stranding in the left lateral pelvis which is unchanged. Bilateral   inguinal hernias containing fat are present.  VESSELS:  There are vascular calcifications. There is mild dilatation of   the distal abdominal aorta measuring up to approximately 2.5 cm when   compared with 2.0 cm more superiorly.  RETROPERITONEUM: No lymphadenopathy.    ABDOMINAL WALL: There is mild subcutaneous edema.  BONES: Degenerative changes of bone are present.    IMPRESSION:     Mild wall thickening of the colon raising concern for colitis. This does   not appear significantly changed. Suggestion of wall thickening with mild   nodularity in the gastric antrum. Underlying neoplasm cannot be excluded.    Progression of mild ascites and interval development of small bilateral   pleural effusions. Presacral and perirectal soft tissue stranding and   soft tissue stranding in the left lateral pelvis are unchanged when   compared with the prior study.    Asymmetric wall thickening of the right side of the bladder extending   posteriorly and anteriorly. Underlying neoplastic process, be excluded.   Bilateral mild to moderate hydronephrosis and mild hydroureter which may   be related to the bladder process. Please correlate clinically.    Additional findings as above.        < end of copied text >                < from: CT Abdomen and Pelvis w/ Oral Cont and w/ IV Cont (05.21.19 @ 14:15) >    EXAM:  CT ABDOMEN AND PELVIS OC IC                            PROCEDURE DATE:  05/21/2019          INTERPRETATION:  DATE: 5/21/2019 2:15 PM  COMPARISON: None  CLINICAL INFORMATION: Diarrhea, abdominal distention  PROCEDURE:  CT of the Abdomen and Pelvis was performed withintravenous   contrast.  90 ml of Omnipaque 350 was injected intravenously. Oral   contrast was administered        FINDINGS:    LOWER CHEST: Coronary vascular calcification. Trace right pleural   effusion with minimal bibasilar atelectasis.    ABDOMEN AND PELVIS:    LIVER: within normal limits.  BILE DUCTS: Minimal biliary dilatation.  GALLBLADDER: Prior cholecystectomy.  PANCREAS: within normal limits.  SPLEEN: within normal limits.    ADRENALS: within normal limits.  KIDNEYS, URETERS AND BLADDER: Kidneys enhance bilaterally and   symmetrically. Bilateral chronic UPJ obstructions. Bilateral parapelvic   cysts. Exophytic anterior right upper pole renal cyst. Additional   subcentimeter hypodensities bilaterally to smallto characterize. No   intrarenal calculi. Ureters unremarkable. Marked thickening of the right   lateral and anterior bladder wall.    REPRODUCTIVE ORGANS: Extensive infiltrative changes in the pelvis and   presacral region with additional small ascites. Infiltrative changes   along the bilateral pelvic sidewall.    BOWEL: Pancolonic thickening with pericolonic infiltrative changes   compatible with pancolitis, differential including infectious versus   inflammatory etiology with consideration for C. Difficile infection.   Mildly prominent small bowel loops without evidence of obstruction.   Normal appendix. Thickening along the gastric antrum, may represent   gastritis however recommend direct visualization to exclude mucosal   abnormality.    PERITONEUM: no trace to small ascites. Extensive pelvic infiltrative   changes. No free air. No discrete drainable fluid collections.    VESSELS: Atherosclerotic changes .  RETROPERITONEUM: Within normal limits.      ABDOMINAL WALL: within normal limits.  BONES: Degenerative changes and osteopenia.    IMPRESSION:      Pancolonic thickening with pericolonic infiltrative changes compatible   with pancolitis, differential including infectious versus inflammatory   etiology with consideration for C. Difficile infection. Mildly prominent   small bowel loops without evidence of obstruction    Thickening along the gastric antrum, may represent gastritis however   recommend direct visualization to exclude mucosal abnormality.      Marked thickeningalong the right lateral and anterior bladder wall   recommend correlation with urinalysis as well as direct visualization to   exclude neoplasm.    Extensive infiltrate changes in the pelvic fat nonspecific in nature.        < end of copied text >          Radiology: all available radiological tests reviewed    Advanced directives addressed: full resuscitation

## 2019-05-29 NOTE — PROGRESS NOTE ADULT - ASSESSMENT
85 yo male with history of colitis, now intubated after ?aspiration pneumonia. Patient appears improved. Would continue current rx. Will consider sigmoidoscopy to evaluate colitis in the future when patient is improved.

## 2019-05-29 NOTE — PROGRESS NOTE ADULT - ASSESSMENT
1. Patient admitted with abdominal pain distention, hx of coronary artery disease      now with cardiac arrest, ? likely aspiration, no acute ekg changes       Plan:  ICU    PULM: Hypoxic respiratory failure likely from aspiration. Continue Zosyn, NC O2    Cardio: S/P Cardiac arrest-Likely related to aspiration and hypoxia.  Seen by cardio.  Change BBX to IV    Renal: Was in [facility] likely from ATN and is now improving  Continue IVF    GI: NPO, keep to LWS, colonoscopy on hold.  Will try clamping NGT x 4 hrs and if minimal residual will remove     SQH DVT prophylaxis

## 2019-05-29 NOTE — PROGRESS NOTE ADULT - ASSESSMENT
86 M with CAD, HTN CHol  with resp arrest req intubation after vomiting and likely aspiration      CE are minimally elevated - check serial enzymes  echo reveal perserved EF, no significnat valve disease      off of pressor support     will start low dose lopressor for PACs and tachycardia     no urgent need for angiogram at this time    will follow

## 2019-05-29 NOTE — PROGRESS NOTE ADULT - SUBJECTIVE AND OBJECTIVE BOX
HPI:      This patient is an 86 year old male with hx. of CAD, s/p CABG about 5 years ago who presented with weakness and persistent diarrhea      CT of abdomen and Pelvis showed ? early sbo, thickened colon and Pelvic inflammation. The patient was receiving a bowel prep which      he was having some difficulty taking. He acutely developed respiratory distress, ? vomiting ? aspiration and than had cardiac arrest      Code Blue was called.  On arrival cpr was started , patient was emergently intubated. Patient received 1 epinephrine, approximately 5 minutes of      cpr. Then ROSC was obtained. Patient did awake and follow commands after arrest, OG tube was placed. ekg no acute st changes.      Focused echo, good lv function, not on pressors      5/28: Patient following commands, Worsening L Infiltrate on CXR, Weaned for a while this morning  5/29: CXR improved and weand well and extubated this AM.  Still with significant NGT drainage overnight         PAST MEDICAL & SURGICAL HISTORY:  HLD (hyperlipidemia)  HTN (hypertension)  Osteoarthrosis  Hyperlipidemia  Hypertension  CAD (coronary artery disease)  H/O total knee replacement, right  History of cholecystectomy  S/P CABG x 5      FAMILY HISTORY:  Family history of premature CAD      Social Hx:    Allergies    No Known Allergies    Intolerances            ICU Vital Signs Last 24 Hrs  T(C): 37.6 (29 May 2019 04:00), Max: 38.2 (28 May 2019 14:00)  T(F): 99.7 (29 May 2019 04:00), Max: 100.7 (28 May 2019 14:00)  HR: 128 (29 May 2019 11:00) (85 - 128)  BP: 151/65 (29 May 2019 11:00) (92/53 - 151/65)  BP(mean): 84 (29 May 2019 11:00) (62 - 105)  ABP: --  ABP(mean): --  RR: 30 (29 May 2019 11:00) (12 - 30)  SpO2: 96% (29 May 2019 11:00) (93% - 100%)      Mode: AC/ CMV (Assist Control/ Continuous Mandatory Ventilation)  RR (machine): 14  TV (machine): 500  FiO2: 40  PEEP: 5  ITime: 1  MAP: 12  PIP: 26      I&O's Summary    28 May 2019 07:01  -  29 May 2019 07:00  --------------------------------------------------------  IN: 2751 mL / OUT: 1800 mL / NET: 951 mL                              11.9   10.14 )-----------( 144      ( 29 May 2019 06:29 )             36.8       05-29    141  |  108  |  25<H>  ----------------------------<  93  3.7   |  23  |  1.17    Ca    8.7      29 May 2019 06:29  Phos  2.8     05-29  Mg     1.9     05-29        CARDIAC MARKERS ( 28 May 2019 06:24 )  0.280 ng/mL / x     / x     / x     / x      CARDIAC MARKERS ( 27 May 2019 20:07 )  0.051 ng/mL / x     / x     / x     / x            ABG - ( 27 May 2019 20:06 )  pH, Arterial: 7.29  pH, Blood: x     /  pCO2: 41    /  pO2: 86    / HCO3: 19    / Base Excess: -6.4  /  SaO2: 94          MEDICATIONS  (STANDING):  aspirin 325 milliGRAM(s) Oral daily  atorvastatin 20 milliGRAM(s) Oral at bedtime  famotidine    Tablet 20 milliGRAM(s) Oral two times a day  heparin  Injectable 5000 Unit(s) SubCutaneous every 8 hours  metoprolol tartrate Injectable 5 milliGRAM(s) IV Push every 6 hours  multivitamin 1 Tablet(s) Oral daily  nystatin Powder 1 Application(s) Topical two times a day  piperacillin/tazobactam IVPB. 3.375 Gram(s) IV Intermittent every 8 hours  sodium chloride 0.9%. 1000 milliLiter(s) (100 mL/Hr) IV Continuous <Continuous>    MEDICATIONS  (PRN):  acetaminophen   Tablet .. 650 milliGRAM(s) Oral every 6 hours PRN Temp greater or equal to 38C (100.4F), Mild Pain (1 - 3)  prochlorperazine   Injectable 5 milliGRAM(s) IV Push every 4 hours PRN nausea  simethicone 80 milliGRAM(s) Chew every 8 hours PRN Gas      DVT Prophylaxis: [facility]    Advanced Directives:  Discussed with:    Visit Information: 30 min    ** Time is exclusive of billed procedures and/or teaching and/or routine family updates.

## 2019-05-29 NOTE — PROGRESS NOTE ADULT - ASSESSMENT
87yo/M with PMH CAD s/p CABG in 2005, HTN, hyperlipidemia, OA s/p RT TKR, admitted on 5/21 for evaluation of 4 weeks diarrhea that started as pasty stool but progressed to liquid watery stool. No prior antibiotics, no recent travel or food ingestion. No sick contacts and notes improvement in diarrhea since starting antibiotics. No abdominal pain and no fever. No nausea or vomiting.  1. Patient admitted with diarrhea, found to have pan colitis, infectious versus inflammatory  - patient evaluation complicated by aspiration while preparing bowel prep, then cardiac arrest, intubation  - day #6 zosyn for colitis but will cover for aspiration pneumonia as well  - tolerating antibiotics without rashes or side effects   - weaning of ventilatory support per icu  - to have urology evaluation given imaging studies  2. other issues; per medicine

## 2019-05-29 NOTE — PROGRESS NOTE ADULT - SUBJECTIVE AND OBJECTIVE BOX
Patient is a 86y old  Male who presents with a chief complaint of diarrhea (29 May 2019 07:59)      HPI:  87yo/M with PMH CAD s/p CABG in 2005, HTN, hyperlipidemia, OA s/p RT TKR, presented for evaluation of diarrhea. Patient states he had symptoms for almost a month, had been seen by GI as outpatient and had cdiff done about a week ago that was negative. He continues to have diarrhea now associated with urinary incontinence at time and dysuria. Patient was seen by DR Key as outpatient and supposed to have cystoscopy with biopsy last week but cancelled 2 to not feeling well with diarrhea and feeling weak. He denies chest pain or SOB. No fever or chills. Feels abdomen is distended and uncomfortable (21 May 2019 16:24).    Patient is currently awake and intubated. No complaints of abdominal pain.       PAST MEDICAL & SURGICAL HISTORY:  HLD (hyperlipidemia)  HTN (hypertension)  Osteoarthrosis  Hyperlipidemia  Hypertension  CAD (coronary artery disease)  H/O total knee replacement, right  History of cholecystectomy  S/P CABG x 5      MEDICATIONS  (STANDING):  aspirin 325 milliGRAM(s) Oral daily  atorvastatin 20 milliGRAM(s) Oral at bedtime  chlorhexidine 0.12% Liquid 15 milliLiter(s) Oral Mucosa two times a day  famotidine    Tablet 20 milliGRAM(s) Oral two times a day  heparin  Injectable 5000 Unit(s) SubCutaneous every 8 hours  melatonin 5 milliGRAM(s) Oral at bedtime  metoprolol tartrate 25 milliGRAM(s) Oral two times a day  multivitamin 1 Tablet(s) Oral daily  nystatin Powder 1 Application(s) Topical two times a day  phenylephrine    Infusion 1 MICROgram(s)/kG/Min (36.487 mL/Hr) IV Continuous <Continuous>  piperacillin/tazobactam IVPB. 3.375 Gram(s) IV Intermittent every 8 hours  sodium chloride 0.9%. 1000 milliLiter(s) (100 mL/Hr) IV Continuous <Continuous>    MEDICATIONS  (PRN):  acetaminophen   Tablet .. 650 milliGRAM(s) Oral every 6 hours PRN Temp greater or equal to 38C (100.4F), Mild Pain (1 - 3)  aluminum hydroxide/magnesium hydroxide/simethicone Suspension 30 milliLiter(s) Oral every 4 hours PRN Dyspepsia  LORazepam   Injectable 2 milliGRAM(s) IV Push every 2 hours PRN Agitation  prochlorperazine   Injectable 5 milliGRAM(s) IV Push every 4 hours PRN nausea  simethicone 80 milliGRAM(s) Chew every 8 hours PRN Gas      Allergies    No Known Allergies    Intolerances          Vital Signs Last 24 Hrs  T(C): 37.6 (29 May 2019 04:00), Max: 38.2 (28 May 2019 14:00)  T(F): 99.7 (29 May 2019 04:00), Max: 100.7 (28 May 2019 14:00)  HR: 106 (29 May 2019 07:00) (87 - 120)  BP: 133/71 (29 May 2019 07:00) (88/54 - 133/71)  BP(mean): 90 (29 May 2019 07:00) (62 - 93)  RR: 17 (29 May 2019 07:00) (12 - 26)  SpO2: 100% (29 May 2019 07:00) (95% - 100%)    PHYSICAL EXAM:      Respiratory: CTAB  Cardiovascular: S1 and S2, RRR, no M/G/R  Gastrointestinal: BS+, softly distended, nontender    LABS:                        11.9   10.14 )-----------( 144      ( 29 May 2019 06:29 )             36.8     05-29    141  |  108  |  25<H>  ----------------------------<  93  3.7   |  23  |  1.17    Ca    8.7      29 May 2019 06:29  Phos  2.8     05-29  Mg     1.9     05-29            RADIOLOGY & ADDITIONAL STUDIES:

## 2019-05-30 LAB
ANION GAP SERPL CALC-SCNC: 10 MMOL/L — SIGNIFICANT CHANGE UP (ref 5–17)
ANION GAP SERPL CALC-SCNC: 9 MMOL/L — SIGNIFICANT CHANGE UP (ref 5–17)
BUN SERPL-MCNC: 16 MG/DL — SIGNIFICANT CHANGE UP (ref 7–23)
BUN SERPL-MCNC: 18 MG/DL — SIGNIFICANT CHANGE UP (ref 7–23)
CALCIUM SERPL-MCNC: 8.1 MG/DL — LOW (ref 8.5–10.1)
CALCIUM SERPL-MCNC: 8.2 MG/DL — LOW (ref 8.5–10.1)
CHLORIDE SERPL-SCNC: 109 MMOL/L — HIGH (ref 96–108)
CHLORIDE SERPL-SCNC: 109 MMOL/L — HIGH (ref 96–108)
CO2 SERPL-SCNC: 27 MMOL/L — SIGNIFICANT CHANGE UP (ref 22–31)
CO2 SERPL-SCNC: 29 MMOL/L — SIGNIFICANT CHANGE UP (ref 22–31)
CREAT SERPL-MCNC: 0.9 MG/DL — SIGNIFICANT CHANGE UP (ref 0.5–1.3)
CREAT SERPL-MCNC: 0.92 MG/DL — SIGNIFICANT CHANGE UP (ref 0.5–1.3)
FAT STL QN: NORMAL — SIGNIFICANT CHANGE UP
FAT STL QN: NORMAL — SIGNIFICANT CHANGE UP
GLUCOSE SERPL-MCNC: 90 MG/DL — SIGNIFICANT CHANGE UP (ref 70–99)
GLUCOSE SERPL-MCNC: 91 MG/DL — SIGNIFICANT CHANGE UP (ref 70–99)
HCT VFR BLD CALC: 36.1 % — LOW (ref 39–50)
HGB BLD-MCNC: 11.2 G/DL — LOW (ref 13–17)
MAGNESIUM SERPL-MCNC: 1.9 MG/DL — SIGNIFICANT CHANGE UP (ref 1.6–2.6)
MCHC RBC-ENTMCNC: 28.6 PG — SIGNIFICANT CHANGE UP (ref 27–34)
MCHC RBC-ENTMCNC: 31 GM/DL — LOW (ref 32–36)
MCV RBC AUTO: 92.1 FL — SIGNIFICANT CHANGE UP (ref 80–100)
PHOSPHATE SERPL-MCNC: 2.3 MG/DL — LOW (ref 2.5–4.5)
PLATELET # BLD AUTO: 171 K/UL — SIGNIFICANT CHANGE UP (ref 150–400)
POTASSIUM SERPL-MCNC: 2.7 MMOL/L — CRITICAL LOW (ref 3.5–5.3)
POTASSIUM SERPL-MCNC: 3.3 MMOL/L — LOW (ref 3.5–5.3)
POTASSIUM SERPL-SCNC: 2.7 MMOL/L — CRITICAL LOW (ref 3.5–5.3)
POTASSIUM SERPL-SCNC: 3.3 MMOL/L — LOW (ref 3.5–5.3)
RBC # BLD: 3.92 M/UL — LOW (ref 4.2–5.8)
RBC # FLD: 14 % — SIGNIFICANT CHANGE UP (ref 10.3–14.5)
SODIUM SERPL-SCNC: 146 MMOL/L — HIGH (ref 135–145)
SODIUM SERPL-SCNC: 147 MMOL/L — HIGH (ref 135–145)
WBC # BLD: 10.93 K/UL — HIGH (ref 3.8–10.5)
WBC # FLD AUTO: 10.93 K/UL — HIGH (ref 3.8–10.5)

## 2019-05-30 PROCEDURE — 99231 SBSQ HOSP IP/OBS SF/LOW 25: CPT

## 2019-05-30 RX ORDER — METOPROLOL TARTRATE 50 MG
25 TABLET ORAL EVERY 8 HOURS
Refills: 0 | Status: DISCONTINUED | OUTPATIENT
Start: 2019-05-30 | End: 2019-05-30

## 2019-05-30 RX ORDER — SODIUM CHLORIDE 9 MG/ML
1000 INJECTION, SOLUTION INTRAVENOUS
Refills: 0 | Status: DISCONTINUED | OUTPATIENT
Start: 2019-05-30 | End: 2019-05-31

## 2019-05-30 RX ORDER — POTASSIUM CHLORIDE 20 MEQ
10 PACKET (EA) ORAL
Refills: 0 | Status: COMPLETED | OUTPATIENT
Start: 2019-05-30 | End: 2019-05-30

## 2019-05-30 RX ADMIN — Medication 5 MILLIGRAM(S): at 17:20

## 2019-05-30 RX ADMIN — NYSTATIN CREAM 1 APPLICATION(S): 100000 CREAM TOPICAL at 05:38

## 2019-05-30 RX ADMIN — FAMOTIDINE 20 MILLIGRAM(S): 10 INJECTION INTRAVENOUS at 17:21

## 2019-05-30 RX ADMIN — Medication 100 MILLIEQUIVALENT(S): at 16:51

## 2019-05-30 RX ADMIN — Medication 5 MILLIGRAM(S): at 12:23

## 2019-05-30 RX ADMIN — PIPERACILLIN AND TAZOBACTAM 25 GRAM(S): 4; .5 INJECTION, POWDER, LYOPHILIZED, FOR SOLUTION INTRAVENOUS at 16:41

## 2019-05-30 RX ADMIN — Medication 1 TABLET(S): at 12:24

## 2019-05-30 RX ADMIN — PIPERACILLIN AND TAZOBACTAM 25 GRAM(S): 4; .5 INJECTION, POWDER, LYOPHILIZED, FOR SOLUTION INTRAVENOUS at 05:38

## 2019-05-30 RX ADMIN — NYSTATIN CREAM 1 APPLICATION(S): 100000 CREAM TOPICAL at 17:20

## 2019-05-30 RX ADMIN — HEPARIN SODIUM 5000 UNIT(S): 5000 INJECTION INTRAVENOUS; SUBCUTANEOUS at 16:51

## 2019-05-30 RX ADMIN — HEPARIN SODIUM 5000 UNIT(S): 5000 INJECTION INTRAVENOUS; SUBCUTANEOUS at 21:44

## 2019-05-30 RX ADMIN — Medication 100 MILLIEQUIVALENT(S): at 10:08

## 2019-05-30 RX ADMIN — SODIUM CHLORIDE 100 MILLILITER(S): 9 INJECTION, SOLUTION INTRAVENOUS at 21:43

## 2019-05-30 RX ADMIN — Medication 100 MILLIEQUIVALENT(S): at 20:05

## 2019-05-30 RX ADMIN — Medication 325 MILLIGRAM(S): at 12:24

## 2019-05-30 RX ADMIN — Medication 100 MILLIEQUIVALENT(S): at 09:00

## 2019-05-30 RX ADMIN — Medication 100 MILLIEQUIVALENT(S): at 11:57

## 2019-05-30 RX ADMIN — PIPERACILLIN AND TAZOBACTAM 25 GRAM(S): 4; .5 INJECTION, POWDER, LYOPHILIZED, FOR SOLUTION INTRAVENOUS at 21:44

## 2019-05-30 RX ADMIN — Medication 5 MILLIGRAM(S): at 23:58

## 2019-05-30 RX ADMIN — Medication 5 MILLIGRAM(S): at 05:36

## 2019-05-30 RX ADMIN — HEPARIN SODIUM 5000 UNIT(S): 5000 INJECTION INTRAVENOUS; SUBCUTANEOUS at 05:37

## 2019-05-30 RX ADMIN — SODIUM CHLORIDE 100 MILLILITER(S): 9 INJECTION, SOLUTION INTRAVENOUS at 11:57

## 2019-05-30 RX ADMIN — Medication 100 MILLIEQUIVALENT(S): at 16:52

## 2019-05-30 RX ADMIN — SODIUM CHLORIDE 100 MILLILITER(S): 9 INJECTION INTRAMUSCULAR; INTRAVENOUS; SUBCUTANEOUS at 05:36

## 2019-05-30 RX ADMIN — Medication 0.5 MILLIGRAM(S): at 23:49

## 2019-05-30 NOTE — PROGRESS NOTE ADULT - SUBJECTIVE AND OBJECTIVE BOX
Patient is a 86y old  Male who presents with a chief complaint of diarrhea (22 May 2019 07:38)    Date of service: 05-30-19 @ 14:35    Patient extubated; NGT in place  Appears comfortable        ROS unable to obtain secondary to patient medical condition     MEDICATIONS  (STANDING):  aspirin 325 milliGRAM(s) Oral daily  atorvastatin 20 milliGRAM(s) Oral at bedtime  famotidine    Tablet 20 milliGRAM(s) Oral two times a day  heparin  Injectable 5000 Unit(s) SubCutaneous every 8 hours  metoprolol tartrate Injectable 5 milliGRAM(s) IV Push every 6 hours  multivitamin 1 Tablet(s) Oral daily  nystatin Powder 1 Application(s) Topical two times a day  piperacillin/tazobactam IVPB. 3.375 Gram(s) IV Intermittent every 8 hours  sodium chloride 0.45%. 1000 milliLiter(s) (100 mL/Hr) IV Continuous <Continuous>    MEDICATIONS  (PRN):  acetaminophen   Tablet .. 650 milliGRAM(s) Oral every 6 hours PRN Temp greater or equal to 38C (100.4F), Mild Pain (1 - 3)  prochlorperazine   Injectable 5 milliGRAM(s) IV Push every 4 hours PRN nausea  simethicone 80 milliGRAM(s) Chew every 8 hours PRN Gas      Vital Signs Last 24 Hrs  T(C): 36.7 (30 May 2019 09:00), Max: 37.7 (29 May 2019 18:00)  T(F): 98 (30 May 2019 09:00), Max: 99.8 (29 May 2019 18:00)  HR: 94 (30 May 2019 13:00) (84 - 127)  BP: 149/74 (30 May 2019 13:00) (110/72 - 162/82)  BP(mean): 96 (30 May 2019 13:00) (70 - 105)  RR: 25 (30 May 2019 13:00) (18 - 31)  SpO2: 83% (30 May 2019 13:00) (83% - 100%)    Physical Exam:        PE:    Constitutional: frail looking  HEENT: NC/AT, EOMI  Neck: supple; thyroid not palpable  Back: no tenderness  Respiratory: respiratory effort normal; clear to auscultation  Cardiovascular: S1S2 regular, no murmurs  Abdomen: soft, not tender, not distended, positive BS; no liver or spleen organomegaly  Genitourinary: no suprapubic tenderness  Musculoskeletal: no muscle tenderness, no joint swelling or tenderness  Neurological/ Psychiatric:   moving all extremities  Skin: no rashes; no palpable lesions    Labs: all available labs reviewed                        Labs:                    Labs:                        11.2   10.93 )-----------( 171      ( 30 May 2019 05:48 )             36.1     05-30    147<H>  |  109<H>  |  16  ----------------------------<  90  3.3<L>   |  29  |  0.92    Ca    8.2<L>      30 May 2019 13:29  Phos  2.3     05-30  Mg     1.9     05-30             Cultures:               Clostridium difficile Toxin by PCR (05.21.19 @ 20:40)    Clostridium difficile Toxin by PCR: The results of this test should be interpreted with consideration of all  clinical and laboratory findings. This test determines the presence of  the C. difficile tcdB gene at a given time and is not intended to  identify antibiotic associated disease or C. difficile infection without  clinical context.  Successful treatment is based on the resolution of clinical symptoms.  This test should not be used as a "test of cure" because C. difficile DNA  will persist after successful treatment. Repeat testing will not be  permitted.    This test is performed on the BD MAX system using Real-Time PCR and  fluorogenic target-specific hybridization.    C Diff by PCR Result: NotDetec        < from: CT Abdomen and Pelvis w/ Oral Cont and w/ IV Cont (05.24.19 @ 12:53) >    EXAM:  CT ABDOMEN AND PELVIS OC IC                            PROCEDURE DATE:  05/24/2019          INTERPRETATION:  CLINICAL INFORMATION: Colitis with worsening abdominal   distention    COMPARISON: CT scan of the abdomen and pelvis from May 21, 2019    PROCEDURE:   CT of the Abdomen and Pelvis was performed with intravenous contrast.   Intravenous contrast: 90 ml Omnipaque 350. 10 ml discarded.  Oral contrast: None.  Sagittal and coronal reformats were performed.    FINDINGS:    LOWER CHEST: There is interval development of small bilateral pleural   effusions with adjacent atelectasis. Patient is status post sternotomy   and CABG.    LIVER: Within normal limits.  BILE DUCTS: Normal caliber.  GALLBLADDER: Cholecystectomy clips are present increased artifact in the   surrounding region.  SPLEEN: Within normal limits.  PANCREAS: There are a few calcifications within the tail of the pancreas.   Chronic pancreatitis cannot be excluded. Please correlate clinically.  ADRENALS: Within normal limits.  KIDNEYS/URETERS: Right renal cyst measuring up to 2.1 cm in the upper   pole laterally. Bilateral mild-to-moderate hydronephrosis and mild   hydroureter to the level of the pelvis. This is unchanged.    BLADDER: Again noted is marked asymmetric wall thickening of the right   side of the bladder extending anteriorly and posteriorly. Underlying   neoplastic process cannot be excluded.  REPRODUCTIVE ORGANS: Small calcifications in the prostate gland.   Infiltrative soft tissue changes in the presacral and perirectal region   are unchanged.    BOWEL: There is no bowel obstruction. Again noted is wall thickening   throughout the colon which is mild in appearance. There are regions of   underdistended sigmoid colon which limit evaluation. Appendix not well   visualized. No focal inflammatory changes are seen. There is mild   nodularity and wall thickening in the gastric antrum. Underlying neoplasm   cannot be excluded.  PERITONEUM: Mild ascites has increased. There is mild soft tissue   stranding in the left lateral pelvis which is unchanged. Bilateral   inguinal hernias containing fat are present.  VESSELS:  There are vascular calcifications. There is mild dilatation of   the distal abdominal aorta measuring up to approximately 2.5 cm when   compared with 2.0 cm more superiorly.  RETROPERITONEUM: No lymphadenopathy.    ABDOMINAL WALL: There is mild subcutaneous edema.  BONES: Degenerative changes of bone are present.    IMPRESSION:     Mild wall thickening of the colon raising concern for colitis. This does   not appear significantly changed. Suggestion of wall thickening with mild   nodularity in the gastric antrum. Underlying neoplasm cannot be excluded.    Progression of mild ascites and interval development of small bilateral   pleural effusions. Presacral and perirectal soft tissue stranding and   soft tissue stranding in the left lateral pelvis are unchanged when   compared with the prior study.    Asymmetric wall thickening of the right side of the bladder extending   posteriorly and anteriorly. Underlying neoplastic process, be excluded.   Bilateral mild to moderate hydronephrosis and mild hydroureter which may   be related to the bladder process. Please correlate clinically.    Additional findings as above.        < end of copied text >                < from: CT Abdomen and Pelvis w/ Oral Cont and w/ IV Cont (05.21.19 @ 14:15) >    EXAM:  CT ABDOMEN AND PELVIS OC IC                            PROCEDURE DATE:  05/21/2019          INTERPRETATION:  DATE: 5/21/2019 2:15 PM  COMPARISON: None  CLINICAL INFORMATION: Diarrhea, abdominal distention  PROCEDURE:  CT of the Abdomen and Pelvis was performed withintravenous   contrast.  90 ml of Omnipaque 350 was injected intravenously. Oral   contrast was administered        FINDINGS:    LOWER CHEST: Coronary vascular calcification. Trace right pleural   effusion with minimal bibasilar atelectasis.    ABDOMEN AND PELVIS:    LIVER: within normal limits.  BILE DUCTS: Minimal biliary dilatation.  GALLBLADDER: Prior cholecystectomy.  PANCREAS: within normal limits.  SPLEEN: within normal limits.    ADRENALS: within normal limits.  KIDNEYS, URETERS AND BLADDER: Kidneys enhance bilaterally and   symmetrically. Bilateral chronic UPJ obstructions. Bilateral parapelvic   cysts. Exophytic anterior right upper pole renal cyst. Additional   subcentimeter hypodensities bilaterally to smallto characterize. No   intrarenal calculi. Ureters unremarkable. Marked thickening of the right   lateral and anterior bladder wall.    REPRODUCTIVE ORGANS: Extensive infiltrative changes in the pelvis and   presacral region with additional small ascites. Infiltrative changes   along the bilateral pelvic sidewall.    BOWEL: Pancolonic thickening with pericolonic infiltrative changes   compatible with pancolitis, differential including infectious versus   inflammatory etiology with consideration for C. Difficile infection.   Mildly prominent small bowel loops without evidence of obstruction.   Normal appendix. Thickening along the gastric antrum, may represent   gastritis however recommend direct visualization to exclude mucosal   abnormality.    PERITONEUM: no trace to small ascites. Extensive pelvic infiltrative   changes. No free air. No discrete drainable fluid collections.    VESSELS: Atherosclerotic changes .  RETROPERITONEUM: Within normal limits.      ABDOMINAL WALL: within normal limits.  BONES: Degenerative changes and osteopenia.    IMPRESSION:      Pancolonic thickening with pericolonic infiltrative changes compatible   with pancolitis, differential including infectious versus inflammatory   etiology with consideration for C. Difficile infection. Mildly prominent   small bowel loops without evidence of obstruction    Thickening along the gastric antrum, may represent gastritis however   recommend direct visualization to exclude mucosal abnormality.      Marked thickeningalong the right lateral and anterior bladder wall   recommend correlation with urinalysis as well as direct visualization to   exclude neoplasm.    Extensive infiltrate changes in the pelvic fat nonspecific in nature.        < end of copied text >          Radiology: all available radiological tests reviewed    Advanced directives addressed: full resuscitation

## 2019-05-30 NOTE — PROGRESS NOTE ADULT - ASSESSMENT
85yo male with colitis  s/p aspiration pneumonia causing arrest  pt extubated but still weak  will d/w family but if stable, will check EGD to evaluate antrum (thickened on ct scan) with persistent significant drainage from ng tube  will also check sigmoidoscopy to biopsy colon to evaluate for ulcerative colitis  d/w pt and ICU MD

## 2019-05-30 NOTE — PROGRESS NOTE ADULT - SUBJECTIVE AND OBJECTIVE BOX
HPI:      This patient is an 86 year old male with hx. of CAD, s/p CABG about 5 years ago who presented with weakness and persistent diarrhea      CT of abdomen and Pelvis showed ? early sbo, thickened colon and Pelvic inflammation. The patient was receiving a bowel prep which      he was having some difficulty taking. He acutely developed respiratory distress, ? vomiting ? aspiration and than had cardiac arrest      Code Blue was called.  On arrival cpr was started , patient was emergently intubated. Patient received 1 epinephrine, approximately 5 minutes of      cpr. Then ROSC was obtained. Patient did awake and follow commands after arrest, OG tube was placed. ekg no acute st changes.      Focused echo, good lv function, not on pressors      5/28: Patient following commands, Worsening L Infiltrate on CXR, Weaned for a while this morning  5/29: CXR improved and weaned well and extubated this AM.  Still with significant NGT drainage overnight  5/30: Doing well extubated.  Decreasing NGT drainage.  Coughing up some blood.         PAST MEDICAL & SURGICAL HISTORY:  HLD (hyperlipidemia)  HTN (hypertension)  Osteoarthrosis  Hyperlipidemia  Hypertension  CAD (coronary artery disease)  H/O total knee replacement, right  History of cholecystectomy  S/P CABG x 5      FAMILY HISTORY:  Family history of premature CAD      Social Hx:    Allergies    No Known Allergies    Intolerances            ICU Vital Signs Last 24 Hrs  T(C): 36.7 (30 May 2019 09:00), Max: 37.7 (29 May 2019 18:00)  T(F): 98 (30 May 2019 09:00), Max: 99.8 (29 May 2019 18:00)  HR: 108 (30 May 2019 11:00) (84 - 127)  BP: 110/72 (30 May 2019 11:00) (110/72 - 162/82)  BP(mean): 82 (30 May 2019 11:00) (70 - 105)  ABP: --  ABP(mean): --  RR: 28 (30 May 2019 11:00) (18 - 31)  SpO2: 95% (30 May 2019 11:00) (90% - 100%)          I&O's Summary    29 May 2019 07:01  -  30 May 2019 07:00  --------------------------------------------------------  IN: 2201 mL / OUT: 1000 mL / NET: 1201 mL                              11.2   10.93 )-----------( 171      ( 30 May 2019 05:48 )             36.1       05-30    146<H>  |  109<H>  |  18  ----------------------------<  91  2.7<LL>   |  27  |  0.90    Ca    8.1<L>      30 May 2019 05:48  Phos  2.3     05-30  Mg     1.9     05-30      MEDICATIONS  (STANDING):  aspirin 325 milliGRAM(s) Oral daily  atorvastatin 20 milliGRAM(s) Oral at bedtime  famotidine    Tablet 20 milliGRAM(s) Oral two times a day  heparin  Injectable 5000 Unit(s) SubCutaneous every 8 hours  metoprolol tartrate Injectable 5 milliGRAM(s) IV Push every 6 hours  multivitamin 1 Tablet(s) Oral daily  nystatin Powder 1 Application(s) Topical two times a day  piperacillin/tazobactam IVPB. 3.375 Gram(s) IV Intermittent every 8 hours  potassium chloride  10 mEq/100 mL IVPB 10 milliEquivalent(s) IV Intermittent every 1 hour  sodium chloride 0.45%. 1000 milliLiter(s) (100 mL/Hr) IV Continuous <Continuous>    MEDICATIONS  (PRN):  acetaminophen   Tablet .. 650 milliGRAM(s) Oral every 6 hours PRN Temp greater or equal to 38C (100.4F), Mild Pain (1 - 3)  prochlorperazine   Injectable 5 milliGRAM(s) IV Push every 4 hours PRN nausea  simethicone 80 milliGRAM(s) Chew every 8 hours PRN Gas      DVT Prophylaxis: [facility]    Advanced Directives:  Discussed with:    Visit Information: 30 min    ** Time is exclusive of billed procedures and/or teaching and/or routine family updates.

## 2019-05-30 NOTE — PROGRESS NOTE ADULT - ASSESSMENT
87yo/M with PMH CAD s/p CABG in 2005, HTN, hyperlipidemia, OA s/p RT TKR, admitted on 5/21 for evaluation of 4 weeks diarrhea that started as pasty stool but progressed to liquid watery stool. No prior antibiotics, no recent travel or food ingestion. No sick contacts and notes improvement in diarrhea since starting antibiotics. No abdominal pain and no fever. No nausea or vomiting.  1. Patient admitted with diarrhea, found to have pan colitis, infectious versus inflammatory  - patient evaluation complicated by aspiration while preparing bowel prep, then cardiac arrest, intubation  - day #7 zosyn for colitis but will cover for aspiration pneumonia as well  - tolerating antibiotics without rashes or side effects   - pt extubated; anticipated further GI workup planned in next 24-48 hours  - to have urology evaluation given imaging studies  2. other issues; per medicine

## 2019-05-30 NOTE — PROGRESS NOTE ADULT - SUBJECTIVE AND OBJECTIVE BOX
CHIEF COMPLAINT: Patient is a 86y old  Male who presents with a chief complaint of diarrhea (27 May 2019 20:09)      HPI:  85yo/M with PMH CAD s/p CABG in 2005, HTN, hyperlipidemia, OA s/p RT TKR, presented for evaluation of diarrhea. Patient states he had symptoms for almost a month, had been seen by GI as outpatient and had cdiff done about a week ago that was negative. He continues to have diarrhea now associated with urinary incontinence at time and dysuria. Patient was seen by DR Key as outpatient and supposed to have cystoscopy with biopsy last week but cancelled 2 to not feeling well with diarrhea and feeling weak. He denies chest pain or SOB. No fever or chills. Feels abdomen is distended and uncomfortable (21 May 2019 16:24)   aspirated / vomited -resp arrest now intubated in ICU    5/29 improving hemodynamics  5/30 extubated and hemodynamically stable    PMHx: PAST MEDICAL & SURGICAL HISTORY:  HLD (hyperlipidemia)  HTN (hypertension)  Osteoarthrosis  Hyperlipidemia  Hypertension  CAD (coronary artery disease)  H/O total knee replacement, right  History of cholecystectomy  S/P CABG x 5        Soc Hx:  no toxic habits      Allergies: Allergies    No Known Allergies    Intolerances          REVIEW OF SYSTEMS:    CONSTITUTIONAL: No weakness, fevers or chills  EYES/ENT: No visual changes;  No vertigo or throat pain   NECK: No pain or stiffness  RESPIRATORY: No cough, wheezing, hemoptysis; No shortness of breath  CARDIOVASCULAR: No chest pain or palpitations  GASTROINTESTINAL: No abdominal or epigastric pain. No nausea, vomiting, or hematemesis; No diarrhea or constipation. No melena or hematochezia.  GENITOURINARY: No dysuria, frequency or hematuria  NEUROLOGICAL: No numbness or weakness  SKIN: No itching, burning, rashes, or lesions   All other review of systems is negative unless indicated above    ICU Vital Signs Last 24 Hrs  T(C): 37.1 (30 May 2019 04:00), Max: 37.7 (29 May 2019 18:00)  T(F): 98.8 (30 May 2019 04:00), Max: 99.8 (29 May 2019 18:00)  HR: 109 (30 May 2019 06:00) (85 - 128)  BP: 146/82 (30 May 2019 06:00) (117/49 - 162/82)  BP(mean): 101 (30 May 2019 06:00) (70 - 105)  ABP: --  ABP(mean): --  RR: 28 (30 May 2019 06:00) (15 - 31)  SpO2: 90% (30 May 2019 06:00) (90% - 100%)            PHYSICAL EXAM:   Constitutional: NAD, awake and alert, well-developed  HEENT: PERR, EOMI, Normal Hearing, MMM  Neck: Soft and supple, No LAD, No JVD  Respiratory: Breath sounds are clear bilaterally, No wheezing, rales or rhonchi  Cardiovascular: S1 and S2, regular rate and rhythm, no Murmurs, gallops or rubs  Gastrointestinal: Bowel Sounds present, soft, nontender, nondistended, no guarding, no rebound  Extremities: No peripheral edema  Vascular: 2+ peripheral pulses  Neurological: A/O x 3, no focal deficits  Musculoskeletal: 5/5 strength b/l upper and lower extremities  Skin: No rashes    ICU Vital Signs Last 24 Hrs  T(C): 37.1 (30 May 2019 04:00), Max: 37.7 (29 May 2019 18:00)  T(F): 98.8 (30 May 2019 04:00), Max: 99.8 (29 May 2019 18:00)  HR: 109 (30 May 2019 06:00) (85 - 128)  BP: 146/82 (30 May 2019 06:00) (117/49 - 162/82)  BP(mean): 101 (30 May 2019 06:00) (70 - 105)  ABP: --  ABP(mean): --  RR: 28 (30 May 2019 06:00) (15 - 31)  SpO2: 90% (30 May 2019 06:00) (90% - 100%)    LABS: All Labs Reviewed:                                       11.2   10.93 )-----------( 171      ( 30 May 2019 05:48 )             36.1   05-29    141  |  108  |  25<H>  ----------------------------<  93  3.7   |  23  |  1.17    Ca    8.7      29 May 2019 06:29  Phos  2.8     05-29  Mg     1.9     05-29        Blood Culture: Organism --  Gram Stain Blood -- Gram Stain --  Specimen Source .Stool Feces palomo damian  Culture-Blood --        EKG: ST, RBBB, LPFB, possible inferior infarct, no signficiat ST changes     Telemetry:    ECHO:- < from: Transthoracic Echocardiogram (05.28.19 @ 08:46) >   Summary     Endocardium is not always well visualized, however, overall left   ventricular systolic function appears grossly normal. Technically   Difficult Study.   Estimated left ventricular ejection fraction is 55-60 %.   Fibrocalcific changes noted to the Aortic valve leaflets with preserved   leaflet excursion.   No aortic regurgitation is present.   There is thickening and calcification of anterior mitral valve leaflet.   The leaflet opening is normal.   Mild mitral annular calcification is present.   The mitral valve leaflets appear thickened.   EA reversal of the mitral inflow consistent with reduced compliance of   the   left ventricle.   Normal appearing tricuspid valve structure and function.   Trace tricuspid valve regurgitation is present.   Pleural effusion cannot be ruled out.     Signature     ----------------------------------------------------------------   Electronically signed by Dale Morrissey MD(Interpreting   physician) on 05/28/2019 08:05 PM   ----------------------------------------------------------------    < end of copied text >

## 2019-05-30 NOTE — PROGRESS NOTE ADULT - ASSESSMENT
1. Patient admitted with abdominal pain distention, hx of coronary artery disease      now with cardiac arrest, ? likely aspiration, no acute ekg changes       Plan:  ICU    PULM: Hypoxic respiratory failure likely from aspiration. Continue Zosyn, NC O2.  Monitor hemoptysis.      Cardio: S/P Cardiac arrest-Likely related to aspiration and hypoxia.  Seen by cardio.  Changed BBX to IV    Renal: Was in [facility] likely from [facility] and is now improving  Continue IVF    GI: NPO, Clamp NGT x 4 hrs and check residual.  Likely EGD and sigmoidoscopy on 5/31.      SQH DVT prophylaxis

## 2019-05-30 NOTE — PROGRESS NOTE ADULT - ASSESSMENT
86 M with CAD, HTN CHol  with resp arrest req intubation after vomiting and likely aspiration      CE are minimally elevated - check serial enzymes  echo reveal perserved EF, no significnat valve disease       will increase lopressor for PACs and tachycardia     no urgent need for angiogram at this time    will follow asneeded

## 2019-05-31 ENCOUNTER — RESULT REVIEW (OUTPATIENT)
Age: 84
End: 2019-05-31

## 2019-05-31 LAB
ANION GAP SERPL CALC-SCNC: 9 MMOL/L — SIGNIFICANT CHANGE UP (ref 5–17)
BUN SERPL-MCNC: 13 MG/DL — SIGNIFICANT CHANGE UP (ref 7–23)
CALCIUM SERPL-MCNC: 8.4 MG/DL — LOW (ref 8.5–10.1)
CHLORIDE SERPL-SCNC: 107 MMOL/L — SIGNIFICANT CHANGE UP (ref 96–108)
CO2 SERPL-SCNC: 27 MMOL/L — SIGNIFICANT CHANGE UP (ref 22–31)
CREAT SERPL-MCNC: 0.71 MG/DL — SIGNIFICANT CHANGE UP (ref 0.5–1.3)
GLUCOSE SERPL-MCNC: 73 MG/DL — SIGNIFICANT CHANGE UP (ref 70–99)
HCT VFR BLD CALC: 34.5 % — LOW (ref 39–50)
HGB BLD-MCNC: 10.8 G/DL — LOW (ref 13–17)
MAGNESIUM SERPL-MCNC: 1.8 MG/DL — SIGNIFICANT CHANGE UP (ref 1.6–2.6)
MCHC RBC-ENTMCNC: 28.6 PG — SIGNIFICANT CHANGE UP (ref 27–34)
MCHC RBC-ENTMCNC: 31.3 GM/DL — LOW (ref 32–36)
MCV RBC AUTO: 91.3 FL — SIGNIFICANT CHANGE UP (ref 80–100)
PHOSPHATE SERPL-MCNC: 2.3 MG/DL — LOW (ref 2.5–4.5)
PLATELET # BLD AUTO: 193 K/UL — SIGNIFICANT CHANGE UP (ref 150–400)
POTASSIUM SERPL-MCNC: 3.4 MMOL/L — LOW (ref 3.5–5.3)
POTASSIUM SERPL-SCNC: 3.4 MMOL/L — LOW (ref 3.5–5.3)
RBC # BLD: 3.78 M/UL — LOW (ref 4.2–5.8)
RBC # FLD: 14.1 % — SIGNIFICANT CHANGE UP (ref 10.3–14.5)
SODIUM SERPL-SCNC: 143 MMOL/L — SIGNIFICANT CHANGE UP (ref 135–145)
WBC # BLD: 9.49 K/UL — SIGNIFICANT CHANGE UP (ref 3.8–10.5)
WBC # FLD AUTO: 9.49 K/UL — SIGNIFICANT CHANGE UP (ref 3.8–10.5)

## 2019-05-31 PROCEDURE — 88305 TISSUE EXAM BY PATHOLOGIST: CPT | Mod: 26

## 2019-05-31 PROCEDURE — 88342 IMHCHEM/IMCYTCHM 1ST ANTB: CPT | Mod: 26

## 2019-05-31 PROCEDURE — 99231 SBSQ HOSP IP/OBS SF/LOW 25: CPT

## 2019-05-31 PROCEDURE — 88341 IMHCHEM/IMCYTCHM EA ADD ANTB: CPT | Mod: 26

## 2019-05-31 PROCEDURE — 71045 X-RAY EXAM CHEST 1 VIEW: CPT | Mod: 26

## 2019-05-31 PROCEDURE — 88312 SPECIAL STAINS GROUP 1: CPT | Mod: 26

## 2019-05-31 RX ORDER — FUROSEMIDE 40 MG
40 TABLET ORAL ONCE
Refills: 0 | Status: COMPLETED | OUTPATIENT
Start: 2019-05-31 | End: 2019-05-31

## 2019-05-31 RX ORDER — POTASSIUM CHLORIDE 20 MEQ
10 PACKET (EA) ORAL ONCE
Refills: 0 | Status: DISCONTINUED | OUTPATIENT
Start: 2019-05-31 | End: 2019-05-31

## 2019-05-31 RX ORDER — POTASSIUM CHLORIDE 20 MEQ
10 PACKET (EA) ORAL
Refills: 0 | Status: COMPLETED | OUTPATIENT
Start: 2019-05-31 | End: 2019-05-31

## 2019-05-31 RX ADMIN — PIPERACILLIN AND TAZOBACTAM 25 GRAM(S): 4; .5 INJECTION, POWDER, LYOPHILIZED, FOR SOLUTION INTRAVENOUS at 16:53

## 2019-05-31 RX ADMIN — PIPERACILLIN AND TAZOBACTAM 25 GRAM(S): 4; .5 INJECTION, POWDER, LYOPHILIZED, FOR SOLUTION INTRAVENOUS at 06:35

## 2019-05-31 RX ADMIN — NYSTATIN CREAM 1 APPLICATION(S): 100000 CREAM TOPICAL at 06:35

## 2019-05-31 RX ADMIN — Medication 5 MILLIGRAM(S): at 18:32

## 2019-05-31 RX ADMIN — PIPERACILLIN AND TAZOBACTAM 25 GRAM(S): 4; .5 INJECTION, POWDER, LYOPHILIZED, FOR SOLUTION INTRAVENOUS at 22:05

## 2019-05-31 RX ADMIN — Medication 5 MILLIGRAM(S): at 12:08

## 2019-05-31 RX ADMIN — Medication 40 MILLIGRAM(S): at 10:52

## 2019-05-31 RX ADMIN — HEPARIN SODIUM 5000 UNIT(S): 5000 INJECTION INTRAVENOUS; SUBCUTANEOUS at 22:05

## 2019-05-31 RX ADMIN — Medication 5 MILLIGRAM(S): at 06:35

## 2019-05-31 RX ADMIN — Medication 100 MILLIEQUIVALENT(S): at 12:08

## 2019-05-31 RX ADMIN — Medication 100 MILLIEQUIVALENT(S): at 13:49

## 2019-05-31 RX ADMIN — Medication 100 MILLIEQUIVALENT(S): at 10:52

## 2019-05-31 RX ADMIN — HEPARIN SODIUM 5000 UNIT(S): 5000 INJECTION INTRAVENOUS; SUBCUTANEOUS at 16:53

## 2019-05-31 RX ADMIN — HEPARIN SODIUM 5000 UNIT(S): 5000 INJECTION INTRAVENOUS; SUBCUTANEOUS at 06:35

## 2019-05-31 RX ADMIN — FAMOTIDINE 20 MILLIGRAM(S): 10 INJECTION INTRAVENOUS at 18:33

## 2019-05-31 RX ADMIN — NYSTATIN CREAM 1 APPLICATION(S): 100000 CREAM TOPICAL at 18:34

## 2019-05-31 NOTE — PROGRESS NOTE ADULT - ASSESSMENT
1. Patient admitted with abdominal pain distention, hx of coronary artery disease      now with cardiac arrest, ? likely aspiration, no acute ekg changes       Plan:  ICU    PULM: Hypoxic respiratory failure likely from aspiration. Continue Zosyn, NC O2.  Hemoptysis improved.  lasix 40 given for increased PVC    Cardio: S/P Cardiac arrest-Likely related to aspiration and hypoxia.  Seen by cardio.  Changed BBX to IV    Renal: Was in [facility] likely from [facility] and is now improving D/C IVF    GI: NPO, Clamp NGT x 4 hrs and check residual.  Likely EGD and sigmoidoscopy on 5/31.      SQH DVT prophylaxis

## 2019-05-31 NOTE — PROGRESS NOTE ADULT - SUBJECTIVE AND OBJECTIVE BOX
Patient is a 86y old  Male who presents with a chief complaint of diarrhea (30 May 2019 14:34)      HPI:  87yo/M with PMH CAD s/p CABG in 2005, HTN, hyperlipidemia, OA s/p RT TKR, presented for evaluation of diarrhea. Patient states he had symptoms for almost a month, had been seen by GI as outpatient and had cdiff done about a week ago that was negative. He continues to have diarrhea now associated with urinary incontinence at time and dysuria. Patient was seen by DR Key as outpatient and supposed to have cystoscopy with biopsy last week but cancelled 2 to not feeling well with diarrhea and feeling weak. He denies chest pain or SOB. No fever or chills. Feels abdomen is distended and uncomfortable (21 May 2019 16:24).    Patient is now extubated. Awake and alert. No complaints of abdominal pain. Mildly nauseated.       PAST MEDICAL & SURGICAL HISTORY:  HLD (hyperlipidemia)  HTN (hypertension)  Osteoarthrosis  Hyperlipidemia  Hypertension  CAD (coronary artery disease)  H/O total knee replacement, right  History of cholecystectomy  S/P CABG x 5      MEDICATIONS  (STANDING):  aspirin 325 milliGRAM(s) Oral daily  atorvastatin 20 milliGRAM(s) Oral at bedtime  famotidine    Tablet 20 milliGRAM(s) Oral two times a day  heparin  Injectable 5000 Unit(s) SubCutaneous every 8 hours  metoprolol tartrate Injectable 5 milliGRAM(s) IV Push every 6 hours  multivitamin 1 Tablet(s) Oral daily  nystatin Powder 1 Application(s) Topical two times a day  piperacillin/tazobactam IVPB. 3.375 Gram(s) IV Intermittent every 8 hours  potassium chloride  10 mEq/100 mL IVPB 10 milliEquivalent(s) IV Intermittent every 1 hour  sodium chloride 0.45%. 1000 milliLiter(s) (100 mL/Hr) IV Continuous <Continuous>    MEDICATIONS  (PRN):  acetaminophen   Tablet .. 650 milliGRAM(s) Oral every 6 hours PRN Temp greater or equal to 38C (100.4F), Mild Pain (1 - 3)  prochlorperazine   Injectable 5 milliGRAM(s) IV Push every 4 hours PRN nausea  simethicone 80 milliGRAM(s) Chew every 8 hours PRN Gas      Allergies    No Known Allergies    Intolerances          Vital Signs Last 24 Hrs  T(C): 37.1 (31 May 2019 06:00), Max: 37.1 (30 May 2019 12:00)  T(F): 98.7 (31 May 2019 06:00), Max: 98.8 (30 May 2019 22:00)  HR: 95 (31 May 2019 06:00) (64 - 117)  BP: 144/77 (31 May 2019 06:00) (110/72 - 158/95)  BP(mean): 97 (31 May 2019 06:00) (77 - 135)  RR: 30 (31 May 2019 06:00) (20 - 31)  SpO2: 94% (31 May 2019 06:00) (83% - 100%)    PHYSICAL EXAM:    Respiratory:  occasional rhonchi  Cardiovascular: S1 and S2, RRR, no M/G/R  Gastrointestinal: BS+, soft, NT/ND    LABS:                        10.8   9.49  )-----------( 193      ( 31 May 2019 06:09 )             34.5     05-31    143  |  107  |  13  ----------------------------<  73  3.4<L>   |  27  |  0.71    Ca    8.4<L>      31 May 2019 06:09  Phos  2.3     05-31  Mg     1.8     05-31            RADIOLOGY & ADDITIONAL STUDIES:

## 2019-05-31 NOTE — PROGRESS NOTE ADULT - SUBJECTIVE AND OBJECTIVE BOX
Patient is a 86y old  Male who presents with a chief complaint of diarrhea (22 May 2019 07:38)    Date of service: 05-31-19 @ 13:30      Patient having loose stools, awaiting EGD and sigmoidoscopy      ROS unable to obtain secondary to patient medical condition     MEDICATIONS  (STANDING):  aspirin 325 milliGRAM(s) Oral daily  atorvastatin 20 milliGRAM(s) Oral at bedtime  famotidine    Tablet 20 milliGRAM(s) Oral two times a day  heparin  Injectable 5000 Unit(s) SubCutaneous every 8 hours  metoprolol tartrate Injectable 5 milliGRAM(s) IV Push every 6 hours  multivitamin 1 Tablet(s) Oral daily  nystatin Powder 1 Application(s) Topical two times a day  piperacillin/tazobactam IVPB. 3.375 Gram(s) IV Intermittent every 8 hours  potassium chloride  10 mEq/100 mL IVPB 10 milliEquivalent(s) IV Intermittent every 1 hour  potassium chloride  10 mEq/100 mL IVPB 10 milliEquivalent(s) IV Intermittent once    MEDICATIONS  (PRN):  acetaminophen   Tablet .. 650 milliGRAM(s) Oral every 6 hours PRN Temp greater or equal to 38C (100.4F), Mild Pain (1 - 3)  prochlorperazine   Injectable 5 milliGRAM(s) IV Push every 4 hours PRN nausea  simethicone 80 milliGRAM(s) Chew every 8 hours PRN Gas      Vital Signs Last 24 Hrs  T(C): 37.1 (31 May 2019 10:00), Max: 37.1 (30 May 2019 18:00)  T(F): 98.8 (31 May 2019 10:00), Max: 98.8 (30 May 2019 22:00)  HR: 101 (31 May 2019 13:00) (64 - 109)  BP: 141/77 (31 May 2019 13:00) (125/58 - 158/95)  BP(mean): 97 (31 May 2019 13:00) (73 - 135)  RR: 23 (31 May 2019 13:00) (13 - 30)  SpO2: 93% (31 May 2019 13:00) (87% - 100%)    Physical Exam:      PE:    Constitutional: frail looking  HEENT: NC/AT, EOMI  Neck: supple; thyroid not palpable  Back: no tenderness  Respiratory: respiratory effort normal; clear to auscultation  Cardiovascular: S1S2 regular, no murmurs  Abdomen: soft, not tender, not distended, positive BS; no liver or spleen organomegaly  Genitourinary: no suprapubic tenderness  Musculoskeletal: no muscle tenderness, no joint swelling or tenderness  Neurological/ Psychiatric:   moving all extremities  Skin: no rashes; no palpable lesions    Labs: all available labs reviewed                        Labs:                    Labs:             Labs:                        10.8   9.49  )-----------( 193      ( 31 May 2019 06:09 )             34.5     05-31    143  |  107  |  13  ----------------------------<  73  3.4<L>   |  27  |  0.71    Ca    8.4<L>      31 May 2019 06:09  Phos  2.3     05-31  Mg     1.8     05-31             Cultures:             Clostridium difficile Toxin by PCR (05.21.19 @ 20:40)    Clostridium difficile Toxin by PCR: The results of this test should be interpreted with consideration of all  clinical and laboratory findings. This test determines the presence of  the C. difficile tcdB gene at a given time and is not intended to  identify antibiotic associated disease or C. difficile infection without  clinical context.  Successful treatment is based on the resolution of clinical symptoms.  This test should not be used as a "test of cure" because C. difficile DNA  will persist after successful treatment. Repeat testing will not be  permitted.    This test is performed on the BD MAX system using Real-Time PCR and  fluorogenic target-specific hybridization.    C Diff by PCR Result: NotDetec        < from: CT Abdomen and Pelvis w/ Oral Cont and w/ IV Cont (05.24.19 @ 12:53) >    EXAM:  CT ABDOMEN AND PELVIS OC IC                            PROCEDURE DATE:  05/24/2019          INTERPRETATION:  CLINICAL INFORMATION: Colitis with worsening abdominal   distention    COMPARISON: CT scan of the abdomen and pelvis from May 21, 2019    PROCEDURE:   CT of the Abdomen and Pelvis was performed with intravenous contrast.   Intravenous contrast: 90 ml Omnipaque 350. 10 ml discarded.  Oral contrast: None.  Sagittal and coronal reformats were performed.    FINDINGS:    LOWER CHEST: There is interval development of small bilateral pleural   effusions with adjacent atelectasis. Patient is status post sternotomy   and CABG.    LIVER: Within normal limits.  BILE DUCTS: Normal caliber.  GALLBLADDER: Cholecystectomy clips are present increased artifact in the   surrounding region.  SPLEEN: Within normal limits.  PANCREAS: There are a few calcifications within the tail of the pancreas.   Chronic pancreatitis cannot be excluded. Please correlate clinically.  ADRENALS: Within normal limits.  KIDNEYS/URETERS: Right renal cyst measuring up to 2.1 cm in the upper   pole laterally. Bilateral mild-to-moderate hydronephrosis and mild   hydroureter to the level of the pelvis. This is unchanged.    BLADDER: Again noted is marked asymmetric wall thickening of the right   side of the bladder extending anteriorly and posteriorly. Underlying   neoplastic process cannot be excluded.  REPRODUCTIVE ORGANS: Small calcifications in the prostate gland.   Infiltrative soft tissue changes in the presacral and perirectal region   are unchanged.    BOWEL: There is no bowel obstruction. Again noted is wall thickening   throughout the colon which is mild in appearance. There are regions of   underdistended sigmoid colon which limit evaluation. Appendix not well   visualized. No focal inflammatory changes are seen. There is mild   nodularity and wall thickening in the gastric antrum. Underlying neoplasm   cannot be excluded.  PERITONEUM: Mild ascites has increased. There is mild soft tissue   stranding in the left lateral pelvis which is unchanged. Bilateral   inguinal hernias containing fat are present.  VESSELS:  There are vascular calcifications. There is mild dilatation of   the distal abdominal aorta measuring up to approximately 2.5 cm when   compared with 2.0 cm more superiorly.  RETROPERITONEUM: No lymphadenopathy.    ABDOMINAL WALL: There is mild subcutaneous edema.  BONES: Degenerative changes of bone are present.    IMPRESSION:     Mild wall thickening of the colon raising concern for colitis. This does   not appear significantly changed. Suggestion of wall thickening with mild   nodularity in the gastric antrum. Underlying neoplasm cannot be excluded.    Progression of mild ascites and interval development of small bilateral   pleural effusions. Presacral and perirectal soft tissue stranding and   soft tissue stranding in the left lateral pelvis are unchanged when   compared with the prior study.    Asymmetric wall thickening of the right side of the bladder extending   posteriorly and anteriorly. Underlying neoplastic process, be excluded.   Bilateral mild to moderate hydronephrosis and mild hydroureter which may   be related to the bladder process. Please correlate clinically.    Additional findings as above.        < end of copied text >                < from: CT Abdomen and Pelvis w/ Oral Cont and w/ IV Cont (05.21.19 @ 14:15) >    EXAM:  CT ABDOMEN AND PELVIS OC IC                            PROCEDURE DATE:  05/21/2019          INTERPRETATION:  DATE: 5/21/2019 2:15 PM  COMPARISON: None  CLINICAL INFORMATION: Diarrhea, abdominal distention  PROCEDURE:  CT of the Abdomen and Pelvis was performed withintravenous   contrast.  90 ml of Omnipaque 350 was injected intravenously. Oral   contrast was administered        FINDINGS:    LOWER CHEST: Coronary vascular calcification. Trace right pleural   effusion with minimal bibasilar atelectasis.    ABDOMEN AND PELVIS:    LIVER: within normal limits.  BILE DUCTS: Minimal biliary dilatation.  GALLBLADDER: Prior cholecystectomy.  PANCREAS: within normal limits.  SPLEEN: within normal limits.    ADRENALS: within normal limits.  KIDNEYS, URETERS AND BLADDER: Kidneys enhance bilaterally and   symmetrically. Bilateral chronic UPJ obstructions. Bilateral parapelvic   cysts. Exophytic anterior right upper pole renal cyst. Additional   subcentimeter hypodensities bilaterally to smallto characterize. No   intrarenal calculi. Ureters unremarkable. Marked thickening of the right   lateral and anterior bladder wall.    REPRODUCTIVE ORGANS: Extensive infiltrative changes in the pelvis and   presacral region with additional small ascites. Infiltrative changes   along the bilateral pelvic sidewall.    BOWEL: Pancolonic thickening with pericolonic infiltrative changes   compatible with pancolitis, differential including infectious versus   inflammatory etiology with consideration for C. Difficile infection.   Mildly prominent small bowel loops without evidence of obstruction.   Normal appendix. Thickening along the gastric antrum, may represent   gastritis however recommend direct visualization to exclude mucosal   abnormality.    PERITONEUM: no trace to small ascites. Extensive pelvic infiltrative   changes. No free air. No discrete drainable fluid collections.    VESSELS: Atherosclerotic changes .  RETROPERITONEUM: Within normal limits.      ABDOMINAL WALL: within normal limits.  BONES: Degenerative changes and osteopenia.    IMPRESSION:      Pancolonic thickening with pericolonic infiltrative changes compatible   with pancolitis, differential including infectious versus inflammatory   etiology with consideration for C. Difficile infection. Mildly prominent   small bowel loops without evidence of obstruction    Thickening along the gastric antrum, may represent gastritis however   recommend direct visualization to exclude mucosal abnormality.      Marked thickeningalong the right lateral and anterior bladder wall   recommend correlation with urinalysis as well as direct visualization to   exclude neoplasm.    Extensive infiltrate changes in the pelvic fat nonspecific in nature.        < end of copied text >          Radiology: all available radiological tests reviewed    Advanced directives addressed: full resuscitation

## 2019-05-31 NOTE — PHYSICAL THERAPY INITIAL EVALUATION ADULT - GENERAL OBSERVATIONS, REHAB EVAL
pt rec'd supine in bed in ICU, alert, verbal, frail appearing, NGT, monitors, SCds in place, son present. per nsg defer OOB at this time as pt to receive enemas f/b endoscopy and sigmoidoscopy today. pt rec'd supine in bed in ICU, alert, verbal, frail appearing, NGT, monitors, O2 in place, son present. per nsg defer OOB at this time as pt to receive enemas f/b endoscopy and sigmoidoscopy today.

## 2019-05-31 NOTE — PHYSICAL THERAPY INITIAL EVALUATION ADULT - MODALITIES TREATMENT COMMENTS
deferred OOB per nsg, performed b/s therex-see flow sheet
Patient left in bed as found, call bell in reach. Educated patient in benefits of being out of bed. RN informed of session/status.

## 2019-05-31 NOTE — PROGRESS NOTE ADULT - ASSESSMENT
85yo/M with PMH CAD s/p CABG in 2005, HTN, hyperlipidemia, OA s/p RT TKR, admitted on 5/21 for evaluation of 4 weeks diarrhea that started as pasty stool but progressed to liquid watery stool. No prior antibiotics, no recent travel or food ingestion. No sick contacts and notes improvement in diarrhea since starting antibiotics. No abdominal pain and no fever. No nausea or vomiting.  1. Patient admitted with diarrhea, found to have pan colitis, infectious versus inflammatory  - patient evaluation complicated by aspiration while preparing bowel prep, then cardiac arrest, intubation  - day #8 zosyn for colitis but will cover for aspiration pneumonia as well  - tolerating antibiotics without rashes or side effects   - pt to have further GI workup; expect to stop antibiotics thereafter pending evaluation  - to have urology evaluation given imaging studies  2. other issues; per medicine

## 2019-05-31 NOTE — PHYSICAL THERAPY INITIAL EVALUATION ADULT - PLANNED THERAPY INTERVENTIONS, PT EVAL
transfer training/gait training/balance training/strengthening/bed mobility training
bed mobility training/transfer training/gait training

## 2019-05-31 NOTE — PHYSICAL THERAPY INITIAL EVALUATION ADULT - DIAGNOSIS, PT EVAL
Hypoxic respiratory failure likely from aspiration,  S/P Cardiac arrest-Likely related to aspiration and hypoxia, MARQUEZ, pna, pan colitis
diarrhea, found to have pan colitis, infectious versus inflammatory

## 2019-05-31 NOTE — PHYSICAL THERAPY INITIAL EVALUATION ADULT - ACTIVE RANGE OF MOTION EXAMINATION, REHAB EVAL
R shld flex ~90, R knee flex< L, WFL supine/bilateral  lower extremity Active ROM was WFL (within functional limits)/bilateral upper extremity Active ROM was WFL (within functional limits)
bilateral  lower extremity Active ROM was WFL (within functional limits)/bilateral upper extremity Active ROM was WFL (within functional limits)/R UE to 90* from OA

## 2019-05-31 NOTE — PROGRESS NOTE ADULT - ASSESSMENT
85 yo male with history of colitis (?etiology) and thickened stomach on CT scan. For upper endoscopy and sigmoidoscopy today.

## 2019-05-31 NOTE — CHART NOTE - NSCHARTNOTEFT_GEN_A_CORE
s/p EGD and sigmoidoscopy    full procedure note dictated  pt intubated for airway protection prior to procedures    EGD - significant gastroduodenitis, but no mass  erosive esophagitis at least partially due to NG tube  biopsies taken from antrum and duodenal bulb    sigmoidoscopy - tight anal stricture, unable to advance colonoscope   "baby" pediatric upper scope passed through tight anal stricture  normal appearing colonic mucosa to sigmoid at 30cm  some distal rectal erythema - biopsy taken    plan see if tolerates PO - unclear process but does not appear to be pancolitis, ?>ischemic colitis with secondary ileus and tight anal stricture  continue abx

## 2019-05-31 NOTE — PROGRESS NOTE ADULT - SUBJECTIVE AND OBJECTIVE BOX
HPI:      This patient is an 86 year old male with hx. of CAD, s/p CABG about 5 years ago who presented with weakness and persistent diarrhea      CT of abdomen and Pelvis showed ? early sbo, thickened colon and Pelvic inflammation. The patient was receiving a bowel prep which      he was having some difficulty taking. He acutely developed respiratory distress, ? vomiting ? aspiration and than had cardiac arrest      Code Blue was called.  On arrival cpr was started , patient was emergently intubated. Patient received 1 epinephrine, approximately 5 minutes of      cpr. Then ROSC was obtained. Patient did awake and follow commands after arrest, OG tube was placed. ekg no acute st changes.      Focused echo, good lv function, not on pressors      5/28: Patient following commands, Worsening L Infiltrate on CXR, Weaned for a while this morning  5/29: CXR improved and weaned well and extubated this AM.  Still with significant NGT drainage overnight  5/30: Doing well extubated.  Decreasing NGT drainage.  Coughing up some blood.    5/31: on NC O2, Increased PVC on CXR.  For EGD and Sigmoidoscopy today         PAST MEDICAL & SURGICAL HISTORY:  HLD (hyperlipidemia)  HTN (hypertension)  Osteoarthrosis  Hyperlipidemia  Hypertension  CAD (coronary artery disease)  H/O total knee replacement, right  History of cholecystectomy  S/P CABG x 5      FAMILY HISTORY:  Family history of premature CAD      Social Hx:    Allergies    No Known Allergies    Intolerances            ICU Vital Signs Last 24 Hrs  T(C): 37.1 (31 May 2019 10:00), Max: 37.1 (30 May 2019 18:00)  T(F): 98.8 (31 May 2019 10:00), Max: 98.8 (30 May 2019 22:00)  HR: 101 (31 May 2019 13:00) (64 - 109)  BP: 141/77 (31 May 2019 13:00) (125/58 - 158/95)  BP(mean): 97 (31 May 2019 13:00) (73 - 135)  ABP: --  ABP(mean): --  RR: 23 (31 May 2019 13:00) (13 - 30)  SpO2: 93% (31 May 2019 13:00) (87% - 100%)          I&O's Summary    30 May 2019 07:01  -  31 May 2019 07:00  --------------------------------------------------------  IN: 3032 mL / OUT: 120 mL / NET: 2912 mL                              10.8   9.49  )-----------( 193      ( 31 May 2019 06:09 )             34.5       05-31    143  |  107  |  13  ----------------------------<  73  3.4<L>   |  27  |  0.71    Ca    8.4<L>      31 May 2019 06:09  Phos  2.3     05-31  Mg     1.8     05-31    MEDICATIONS  (STANDING):  aspirin 325 milliGRAM(s) Oral daily  atorvastatin 20 milliGRAM(s) Oral at bedtime  famotidine    Tablet 20 milliGRAM(s) Oral two times a day  heparin  Injectable 5000 Unit(s) SubCutaneous every 8 hours  metoprolol tartrate Injectable 5 milliGRAM(s) IV Push every 6 hours  multivitamin 1 Tablet(s) Oral daily  nystatin Powder 1 Application(s) Topical two times a day  piperacillin/tazobactam IVPB. 3.375 Gram(s) IV Intermittent every 8 hours  potassium chloride  10 mEq/100 mL IVPB 10 milliEquivalent(s) IV Intermittent every 1 hour    MEDICATIONS  (PRN):  acetaminophen   Tablet .. 650 milliGRAM(s) Oral every 6 hours PRN Temp greater or equal to 38C (100.4F), Mild Pain (1 - 3)  prochlorperazine   Injectable 5 milliGRAM(s) IV Push every 4 hours PRN nausea  simethicone 80 milliGRAM(s) Chew every 8 hours PRN Gas      DVT Prophylaxis: [facility]    Advanced Directives:  Discussed with:    Visit Information:    ** Time is exclusive of billed procedures and/or teaching and/or routine family updates.

## 2019-05-31 NOTE — PHYSICAL THERAPY INITIAL EVALUATION ADULT - PRECAUTIONS/LIMITATIONS, REHAB EVAL
NGT/fall precautions/cardiac precautions
fall precautions/isolation precautions/C-diff.
Universal Safety Interventions

## 2019-05-31 NOTE — PHYSICAL THERAPY INITIAL EVALUATION ADULT - PERTINENT HX OF CURRENT PROBLEM, REHAB EVAL
p/w weakness and persistent diarrhea. CT A/P showed ? early sbo, thickened colon and Pelvic inflammation. pt was receiving a bowel prep which he was having difficulty taking. pt acutely developed resp distress, ? vomiting, ? aspiration and then had cardiac arrest. code blue called, CPR initiated, pt intubated. OG tube was placed. ekg no acute st changes. Worsening L Infiltrate on CXR then improved. extubated 5/29.
87 yo M admitted with persistent diarrhea.  Abd CT: pancolitis.

## 2019-06-01 LAB
ANION GAP SERPL CALC-SCNC: 12 MMOL/L — SIGNIFICANT CHANGE UP (ref 5–17)
BUN SERPL-MCNC: 14 MG/DL — SIGNIFICANT CHANGE UP (ref 7–23)
CALCIUM SERPL-MCNC: 8.4 MG/DL — LOW (ref 8.5–10.1)
CHLORIDE SERPL-SCNC: 109 MMOL/L — HIGH (ref 96–108)
CO2 SERPL-SCNC: 25 MMOL/L — SIGNIFICANT CHANGE UP (ref 22–31)
CREAT SERPL-MCNC: 0.73 MG/DL — SIGNIFICANT CHANGE UP (ref 0.5–1.3)
GLUCOSE SERPL-MCNC: 73 MG/DL — SIGNIFICANT CHANGE UP (ref 70–99)
HCT VFR BLD CALC: 34.7 % — LOW (ref 39–50)
HGB BLD-MCNC: 11 G/DL — LOW (ref 13–17)
MAGNESIUM SERPL-MCNC: 1.6 MG/DL — SIGNIFICANT CHANGE UP (ref 1.6–2.6)
MCHC RBC-ENTMCNC: 28.6 PG — SIGNIFICANT CHANGE UP (ref 27–34)
MCHC RBC-ENTMCNC: 31.7 GM/DL — LOW (ref 32–36)
MCV RBC AUTO: 90.1 FL — SIGNIFICANT CHANGE UP (ref 80–100)
PHOSPHATE SERPL-MCNC: 2.9 MG/DL — SIGNIFICANT CHANGE UP (ref 2.5–4.5)
PLATELET # BLD AUTO: 194 K/UL — SIGNIFICANT CHANGE UP (ref 150–400)
POTASSIUM SERPL-MCNC: 3.2 MMOL/L — LOW (ref 3.5–5.3)
POTASSIUM SERPL-SCNC: 3.2 MMOL/L — LOW (ref 3.5–5.3)
RBC # BLD: 3.85 M/UL — LOW (ref 4.2–5.8)
RBC # FLD: 14 % — SIGNIFICANT CHANGE UP (ref 10.3–14.5)
SODIUM SERPL-SCNC: 146 MMOL/L — HIGH (ref 135–145)
WBC # BLD: 7.4 K/UL — SIGNIFICANT CHANGE UP (ref 3.8–10.5)
WBC # FLD AUTO: 7.4 K/UL — SIGNIFICANT CHANGE UP (ref 3.8–10.5)

## 2019-06-01 RX ORDER — ZINC OXIDE 200 MG/G
1 OINTMENT TOPICAL EVERY 4 HOURS
Refills: 0 | Status: DISCONTINUED | OUTPATIENT
Start: 2019-06-01 | End: 2019-06-11

## 2019-06-01 RX ORDER — LOPERAMIDE HCL 2 MG
2 TABLET ORAL EVERY 6 HOURS
Refills: 0 | Status: DISCONTINUED | OUTPATIENT
Start: 2019-06-01 | End: 2019-06-11

## 2019-06-01 RX ORDER — METOPROLOL TARTRATE 50 MG
25 TABLET ORAL EVERY 8 HOURS
Refills: 0 | Status: DISCONTINUED | OUTPATIENT
Start: 2019-06-01 | End: 2019-06-03

## 2019-06-01 RX ORDER — POTASSIUM CHLORIDE 20 MEQ
40 PACKET (EA) ORAL ONCE
Refills: 0 | Status: COMPLETED | OUTPATIENT
Start: 2019-06-01 | End: 2019-06-01

## 2019-06-01 RX ORDER — ZOLPIDEM TARTRATE 10 MG/1
5 TABLET ORAL AT BEDTIME
Refills: 0 | Status: DISCONTINUED | OUTPATIENT
Start: 2019-06-01 | End: 2019-06-08

## 2019-06-01 RX ADMIN — Medication 5 MILLIGRAM(S): at 05:41

## 2019-06-01 RX ADMIN — HEPARIN SODIUM 5000 UNIT(S): 5000 INJECTION INTRAVENOUS; SUBCUTANEOUS at 22:00

## 2019-06-01 RX ADMIN — ATORVASTATIN CALCIUM 20 MILLIGRAM(S): 80 TABLET, FILM COATED ORAL at 22:00

## 2019-06-01 RX ADMIN — ZINC OXIDE 1 APPLICATION(S): 200 OINTMENT TOPICAL at 08:52

## 2019-06-01 RX ADMIN — Medication 2 MILLIGRAM(S): at 12:15

## 2019-06-01 RX ADMIN — NYSTATIN CREAM 1 APPLICATION(S): 100000 CREAM TOPICAL at 05:30

## 2019-06-01 RX ADMIN — HEPARIN SODIUM 5000 UNIT(S): 5000 INJECTION INTRAVENOUS; SUBCUTANEOUS at 14:29

## 2019-06-01 RX ADMIN — HEPARIN SODIUM 5000 UNIT(S): 5000 INJECTION INTRAVENOUS; SUBCUTANEOUS at 05:41

## 2019-06-01 RX ADMIN — PIPERACILLIN AND TAZOBACTAM 25 GRAM(S): 4; .5 INJECTION, POWDER, LYOPHILIZED, FOR SOLUTION INTRAVENOUS at 22:01

## 2019-06-01 RX ADMIN — Medication 5 MILLIGRAM(S): at 00:50

## 2019-06-01 RX ADMIN — FAMOTIDINE 20 MILLIGRAM(S): 10 INJECTION INTRAVENOUS at 05:40

## 2019-06-01 RX ADMIN — ZOLPIDEM TARTRATE 5 MILLIGRAM(S): 10 TABLET ORAL at 22:02

## 2019-06-01 RX ADMIN — NYSTATIN CREAM 1 APPLICATION(S): 100000 CREAM TOPICAL at 18:51

## 2019-06-01 RX ADMIN — Medication 1 TABLET(S): at 12:15

## 2019-06-01 RX ADMIN — Medication 25 MILLIGRAM(S): at 14:28

## 2019-06-01 RX ADMIN — PIPERACILLIN AND TAZOBACTAM 25 GRAM(S): 4; .5 INJECTION, POWDER, LYOPHILIZED, FOR SOLUTION INTRAVENOUS at 14:29

## 2019-06-01 RX ADMIN — Medication 25 MILLIGRAM(S): at 22:00

## 2019-06-01 RX ADMIN — Medication 40 MILLIEQUIVALENT(S): at 12:15

## 2019-06-01 RX ADMIN — PIPERACILLIN AND TAZOBACTAM 25 GRAM(S): 4; .5 INJECTION, POWDER, LYOPHILIZED, FOR SOLUTION INTRAVENOUS at 05:41

## 2019-06-01 RX ADMIN — FAMOTIDINE 20 MILLIGRAM(S): 10 INJECTION INTRAVENOUS at 18:53

## 2019-06-01 NOTE — PROGRESS NOTE ADULT - SUBJECTIVE AND OBJECTIVE BOX
HPI:      This patient is an 86 year old male with hx. of CAD, s/p CABG about 5 years ago who presented with weakness and persistent diarrhea      CT of abdomen and Pelvis showed ? early sbo, thickened colon and Pelvic inflammation. The patient was receiving a bowel prep which      he was having some difficulty taking. He acutely developed respiratory distress, ? vomiting ? aspiration and than had cardiac arrest      Code Blue was called.  On arrival cpr was started , patient was emergently intubated. Patient received 1 epinephrine, approximately 5 minutes of      cpr. Then ROSC was obtained. Patient did awake and follow commands after arrest, OG tube was placed. ekg no acute st changes.      Focused echo, good lv function, not on pressors      5/28: Patient following commands, Worsening L Infiltrate on CXR, Weaned for a while this morning  5/29: CXR improved and weaned well and extubated this AM.  Still with significant NGT drainage overnight  5/30: Doing well extubated.  Decreasing NGT drainage.  Coughing up some blood.    5/31: on NC O2, Increased PVC on CXR.  For EGD and Sigmoidoscopy today  6/1: Tolerating PO, No other events         PAST MEDICAL & SURGICAL HISTORY:  HLD (hyperlipidemia)  HTN (hypertension)  Osteoarthrosis  Hyperlipidemia  Hypertension  CAD (coronary artery disease)  H/O total knee replacement, right  History of cholecystectomy  S/P CABG x 5      FAMILY HISTORY:  Family history of premature CAD      Social Hx:    Allergies    No Known Allergies    Intolerances            ICU Vital Signs Last 24 Hrs  T(C): 37.2 (01 Jun 2019 10:00), Max: 37.2 (01 Jun 2019 10:00)  T(F): 98.9 (01 Jun 2019 10:00), Max: 98.9 (01 Jun 2019 10:00)  HR: 109 (01 Jun 2019 11:00) (67 - 110)  BP: 152/88 (01 Jun 2019 11:00) (99/61 - 159/95)  BP(mean): 105 (01 Jun 2019 11:00) (72 - 131)  ABP: --  ABP(mean): --  RR: 30 (01 Jun 2019 11:00) (16 - 34)  SpO2: 99% (01 Jun 2019 11:00) (88% - 100%)          I&O's Summary    31 May 2019 07:01  -  01 Jun 2019 07:00  --------------------------------------------------------  IN: 700 mL / OUT: 1000 mL / NET: -300 mL                              11.0   7.40  )-----------( 194      ( 01 Jun 2019 06:31 )             34.7       06-01    146<H>  |  109<H>  |  14  ----------------------------<  73  3.2<L>   |  25  |  0.73    Ca    8.4<L>      01 Jun 2019 06:31  Phos  2.9     06-01  Mg     1.6     06-01                      MEDICATIONS  (STANDING):  aspirin 325 milliGRAM(s) Oral daily  atorvastatin 20 milliGRAM(s) Oral at bedtime  famotidine    Tablet 20 milliGRAM(s) Oral two times a day  heparin  Injectable 5000 Unit(s) SubCutaneous every 8 hours  metoprolol tartrate Injectable 5 milliGRAM(s) IV Push every 6 hours  multivitamin 1 Tablet(s) Oral daily  nystatin Powder 1 Application(s) Topical two times a day  piperacillin/tazobactam IVPB. 3.375 Gram(s) IV Intermittent every 8 hours  potassium chloride   Powder 40 milliEquivalent(s) Oral once    MEDICATIONS  (PRN):  acetaminophen   Tablet .. 650 milliGRAM(s) Oral every 6 hours PRN Temp greater or equal to 38C (100.4F), Mild Pain (1 - 3)  loperamide 2 milliGRAM(s) Oral every 6 hours PRN Diarrhea  prochlorperazine   Injectable 5 milliGRAM(s) IV Push every 4 hours PRN nausea  simethicone 80 milliGRAM(s) Chew every 8 hours PRN Gas  zinc oxide 40% Ointment 1 Application(s) Topical every 4 hours PRN incontinent of stool and urine      DVT Prophylaxis: [facility]    Advanced Directives:  Discussed with:    Visit Information:    ** Time is exclusive of billed procedures and/or teaching and/or routine family updates.

## 2019-06-01 NOTE — PROGRESS NOTE ADULT - RS GEN PE MLT RESP DETAILS PC
no rales/no rhonchi/breath sounds equal/no wheezes
no rhonchi/no wheezes/breath sounds equal/no rales
breath sounds equal/no rales/no wheezes/no rhonchi
no wheezes/breath sounds equal/no rhonchi/no rales

## 2019-06-01 NOTE — PROGRESS NOTE ADULT - ASSESSMENT
1. Patient admitted with abdominal pain distention, hx of coronary artery disease      now with cardiac arrest, ? likely aspiration, no acute ekg changes       Plan:  ICU    PULM: Hypoxic respiratory failure likely from aspiration. Continue Zosyn, NC O2.  Hemoptysis improved.  now on NC O2 and try to wean to RA    Cardio: S/P Cardiac arrest-Likely related to aspiration and hypoxia.  Seen by cardio.  Changed BBX PO    Renal: Was in [facility] likely from ATN and is now improving.  Replace K+    GI: Po.  To be seen by CRS as per GI    [facility] DVT prophylaxis    Transfer to tele    Called into the Hospitalist at 12:11PM 1. Patient admitted with abdominal pain distention, hx of coronary artery disease      now with cardiac arrest, ? likely aspiration, no acute ekg changes       Plan:  ICU    PULM: Hypoxic respiratory failure likely from aspiration. Continue Zosyn, NC O2.  Hemoptysis improved.  now on NC O2 and try to wean to RA    Cardio: S/P Cardiac arrest-Likely related to aspiration and hypoxia.  Seen by cardio.  Changed BBX PO, ACE on hold as well as ASA because of esophagitis.      Renal: Was in [facility] likely from ATN and is now improving.  Replace K+    GI: Po.  To be seen by CRS as per GI    : Was scheduled to have a bladder bx on 5/22 but was admitted to .  Family hoping Dr. ferguson perform the Bx during this admission.      [facility] DVT prophylaxis    Transfer to [facility]    Called into the Hospitalist at 12:11PM

## 2019-06-01 NOTE — PROGRESS NOTE ADULT - GASTROINTESTINAL DETAILS
nontender/no distention/soft
soft/no distention
no distention/soft
soft/bowel sounds normal/no distention
soft/no distention

## 2019-06-01 NOTE — PROGRESS NOTE ADULT - CARDIOVASCULAR DETAILS
positive S1/positive S2
positive S2/positive S1

## 2019-06-02 LAB
ANION GAP SERPL CALC-SCNC: 7 MMOL/L — SIGNIFICANT CHANGE UP (ref 5–17)
BUN SERPL-MCNC: 14 MG/DL — SIGNIFICANT CHANGE UP (ref 7–23)
CALCIUM SERPL-MCNC: 8.5 MG/DL — SIGNIFICANT CHANGE UP (ref 8.5–10.1)
CHLORIDE SERPL-SCNC: 109 MMOL/L — HIGH (ref 96–108)
CO2 SERPL-SCNC: 31 MMOL/L — SIGNIFICANT CHANGE UP (ref 22–31)
CREAT SERPL-MCNC: 0.8 MG/DL — SIGNIFICANT CHANGE UP (ref 0.5–1.3)
GLUCOSE SERPL-MCNC: 105 MG/DL — HIGH (ref 70–99)
HCT VFR BLD CALC: 33.3 % — LOW (ref 39–50)
HGB BLD-MCNC: 10.4 G/DL — LOW (ref 13–17)
MAGNESIUM SERPL-MCNC: 1.7 MG/DL — SIGNIFICANT CHANGE UP (ref 1.6–2.6)
MCHC RBC-ENTMCNC: 28.6 PG — SIGNIFICANT CHANGE UP (ref 27–34)
MCHC RBC-ENTMCNC: 31.2 GM/DL — LOW (ref 32–36)
MCV RBC AUTO: 91.5 FL — SIGNIFICANT CHANGE UP (ref 80–100)
PHOSPHATE SERPL-MCNC: 3.2 MG/DL — SIGNIFICANT CHANGE UP (ref 2.5–4.5)
PLATELET # BLD AUTO: 219 K/UL — SIGNIFICANT CHANGE UP (ref 150–400)
POTASSIUM SERPL-MCNC: 3 MMOL/L — LOW (ref 3.5–5.3)
POTASSIUM SERPL-SCNC: 3 MMOL/L — LOW (ref 3.5–5.3)
RBC # BLD: 3.64 M/UL — LOW (ref 4.2–5.8)
RBC # FLD: 13.9 % — SIGNIFICANT CHANGE UP (ref 10.3–14.5)
SODIUM SERPL-SCNC: 147 MMOL/L — HIGH (ref 135–145)
WBC # BLD: 8.01 K/UL — SIGNIFICANT CHANGE UP (ref 3.8–10.5)
WBC # FLD AUTO: 8.01 K/UL — SIGNIFICANT CHANGE UP (ref 3.8–10.5)

## 2019-06-02 RX ORDER — POTASSIUM CHLORIDE 20 MEQ
10 PACKET (EA) ORAL
Refills: 0 | Status: COMPLETED | OUTPATIENT
Start: 2019-06-02 | End: 2019-06-02

## 2019-06-02 RX ORDER — POTASSIUM CHLORIDE 20 MEQ
40 PACKET (EA) ORAL EVERY 4 HOURS
Refills: 0 | Status: DISCONTINUED | OUTPATIENT
Start: 2019-06-02 | End: 2019-06-02

## 2019-06-02 RX ADMIN — FAMOTIDINE 20 MILLIGRAM(S): 10 INJECTION INTRAVENOUS at 17:46

## 2019-06-02 RX ADMIN — PIPERACILLIN AND TAZOBACTAM 25 GRAM(S): 4; .5 INJECTION, POWDER, LYOPHILIZED, FOR SOLUTION INTRAVENOUS at 13:40

## 2019-06-02 RX ADMIN — NYSTATIN CREAM 1 APPLICATION(S): 100000 CREAM TOPICAL at 06:21

## 2019-06-02 RX ADMIN — ATORVASTATIN CALCIUM 20 MILLIGRAM(S): 80 TABLET, FILM COATED ORAL at 22:12

## 2019-06-02 RX ADMIN — HEPARIN SODIUM 5000 UNIT(S): 5000 INJECTION INTRAVENOUS; SUBCUTANEOUS at 22:12

## 2019-06-02 RX ADMIN — Medication 100 MILLIEQUIVALENT(S): at 19:03

## 2019-06-02 RX ADMIN — Medication 40 MILLIEQUIVALENT(S): at 16:26

## 2019-06-02 RX ADMIN — HEPARIN SODIUM 5000 UNIT(S): 5000 INJECTION INTRAVENOUS; SUBCUTANEOUS at 06:22

## 2019-06-02 RX ADMIN — HEPARIN SODIUM 5000 UNIT(S): 5000 INJECTION INTRAVENOUS; SUBCUTANEOUS at 13:38

## 2019-06-02 RX ADMIN — ZOLPIDEM TARTRATE 5 MILLIGRAM(S): 10 TABLET ORAL at 22:12

## 2019-06-02 RX ADMIN — Medication 25 MILLIGRAM(S): at 13:38

## 2019-06-02 RX ADMIN — Medication 100 MILLIEQUIVALENT(S): at 17:49

## 2019-06-02 RX ADMIN — Medication 25 MILLIGRAM(S): at 06:22

## 2019-06-02 RX ADMIN — Medication 25 MILLIGRAM(S): at 22:12

## 2019-06-02 RX ADMIN — Medication 1 TABLET(S): at 13:38

## 2019-06-02 RX ADMIN — NYSTATIN CREAM 1 APPLICATION(S): 100000 CREAM TOPICAL at 17:44

## 2019-06-02 RX ADMIN — FAMOTIDINE 20 MILLIGRAM(S): 10 INJECTION INTRAVENOUS at 06:22

## 2019-06-02 RX ADMIN — PIPERACILLIN AND TAZOBACTAM 25 GRAM(S): 4; .5 INJECTION, POWDER, LYOPHILIZED, FOR SOLUTION INTRAVENOUS at 06:22

## 2019-06-02 RX ADMIN — PIPERACILLIN AND TAZOBACTAM 25 GRAM(S): 4; .5 INJECTION, POWDER, LYOPHILIZED, FOR SOLUTION INTRAVENOUS at 22:12

## 2019-06-02 RX ADMIN — Medication 100 MILLIEQUIVALENT(S): at 21:53

## 2019-06-02 RX ADMIN — Medication 325 MILLIGRAM(S): at 13:35

## 2019-06-02 NOTE — PROGRESS NOTE ADULT - ASSESSMENT
#Hypoxic respiratory failure likely from aspiration.   -Continue Zosyn, NC O2  -Speech and swallow consult  -Downgrade from ICU on 6/1/19    #S/P Cardiac arrest  -Cardio consulted, appreciate recs  -Continue ASA 325mg  -Continue BB, statin    #Diarrhea/nausea/abd distention: due to Pancolitis with extensive pelvic inflammation: Infectious vs inflammatory  -cdiff negative  -GI PCR negative  -repeat CT imaging still with extensive inflammation, no obstruction   -ESR: 34  -supportive care    # H/O hematuria  -CT shows b/l hydroureternephrosis with thickened bladder  -Pt will need outpatient cystoscopy with biopsy w/ Dr. Briseno to r/o malignancy- family requesting reconsult to see if biopsy can be done while inpatient    #CAD s/p CABG  Continue aspirin, lipitor    Anemia: Stable  -monitor    #HTN  Stable  Continue lopressor and Vasotec    # DVT prophylaxis-on heparin

## 2019-06-02 NOTE — PROGRESS NOTE ADULT - ASSESSMENT
possible microaspiration with eating.  Suggest swallowing evaluation, discussed with hospitalist.    Recent hypoxic respiratory failure possibly secondary to aspiration.

## 2019-06-02 NOTE — PROGRESS NOTE ADULT - SUBJECTIVE AND OBJECTIVE BOX
Patient is a 86y old  Male who presents with a chief complaint of diarrhea (01 Jun 2019 12:02)      HPI:  87yo/M with PMH CAD s/p CABG in 2005, HTN, hyperlipidemia, OA s/p RT TKR, presented for evaluation of diarrhea. Patient states he had symptoms for almost a month, had been seen by GI as outpatient and had cdiff done about a week ago that was negative. He continues to have diarrhea now associated with urinary incontinence at time and dysuria. Patient was seen by DR Key as outpatient and supposed to have cystoscopy with biopsy last week but cancelled 2 to not feeling well with diarrhea and feeling weak. He denies chest pain or SOB. No fever or chills. Feels abdomen is distended and uncomfortable (21 May 2019 16:24)    patient comfortable. Having difficulty with by mouth SC has a lot of fatigue. Seen with son. Son has been trying to help him. He has a weak cough and at least after some of the ssips of and sure he has some coughing.        PAST MEDICAL & SURGICAL HISTORY:  HLD (hyperlipidemia)  HTN (hypertension)  Osteoarthrosis  Hyperlipidemia  Hypertension  CAD (coronary artery disease)  H/O total knee replacement, right  History of cholecystectomy  S/P CABG x 5      MEDICATIONS  (STANDING):  aspirin 325 milliGRAM(s) Oral daily  atorvastatin 20 milliGRAM(s) Oral at bedtime  famotidine    Tablet 20 milliGRAM(s) Oral two times a day  heparin  Injectable 5000 Unit(s) SubCutaneous every 8 hours  metoprolol tartrate 25 milliGRAM(s) Oral every 8 hours  multivitamin 1 Tablet(s) Oral daily  nystatin Powder 1 Application(s) Topical two times a day  piperacillin/tazobactam IVPB. 3.375 Gram(s) IV Intermittent every 8 hours    MEDICATIONS  (PRN):  acetaminophen   Tablet .. 650 milliGRAM(s) Oral every 6 hours PRN Temp greater or equal to 38C (100.4F), Mild Pain (1 - 3)  loperamide 2 milliGRAM(s) Oral every 6 hours PRN Diarrhea  prochlorperazine   Injectable 5 milliGRAM(s) IV Push every 4 hours PRN nausea  simethicone 80 milliGRAM(s) Chew every 8 hours PRN Gas  zinc oxide 40% Ointment 1 Application(s) Topical every 4 hours PRN incontinent of stool and urine  zolpidem 5 milliGRAM(s) Oral at bedtime PRN Insomnia      Allergies    No Known Allergies    Intolerances        SOCIAL HISTORY:NC    FAMILY HISTORY:  Family history of premature CAD      REVIEW OF SYSTEMS:    not reliable  Vital Signs Last 24 Hrs  T(C): 36.3 (02 Jun 2019 05:21), Max: 36.8 (01 Jun 2019 21:51)  T(F): 97.3 (02 Jun 2019 05:21), Max: 98.2 (01 Jun 2019 21:51)  HR: 90 (02 Jun 2019 05:21) (0 - 120)  BP: 102/50 (02 Jun 2019 05:21) (102/50 - 161/85)  BP(mean): 104 (01 Jun 2019 14:00) (104 - 104)  RR: 22 (02 Jun 2019 05:21) (22 - 27)  SpO2: 98% (02 Jun 2019 05:21) (92% - 98%)    PHYSICAL EXAM:    Constitutional: NAD, well-developed  HEENT: EOMI, throat clear  Neck: No LAD, supple  Respiratory: CTA and P  Cardiovascular: S1 and S2, RRR, no M  Gastrointestinal: BS+, soft, NT/ND, neg HSM,  Extremities: No peripheral edema, neg clubing, cyanosis  Vascular: 2+ peripheral pulses  Neurological: A/O x 2, no focal deficits  Psychiatric: Normal mood, normal affect  Skin: No rashes    LABS:  CBC Full  -  ( 02 Jun 2019 05:59 )  WBC Count : 8.01 K/uL  RBC Count : 3.64 M/uL  Hemoglobin : 10.4 g/dL  Hematocrit : 33.3 %  Platelet Count - Automated : 219 K/uL  Mean Cell Volume : 91.5 fl  Mean Cell Hemoglobin : 28.6 pg  Mean Cell Hemoglobin Concentration : 31.2 gm/dL  Auto Neutrophil # : x  Auto Lymphocyte # : x  Auto Monocyte # : x  Auto Eosinophil # : x  Auto Basophil # : x  Auto Neutrophil % : x  Auto Lymphocyte % : x  Auto Monocyte % : x  Auto Eosinophil % : x  Auto Basophil % : x    06-02    147<H>  |  109<H>  |  14  ----------------------------<  105<H>  3.0<L>   |  31  |  0.80    Ca    8.5      02 Jun 2019 05:59  Phos  3.2     06-02  Mg     1.7     06-02          05-26 @ 07:15  Hep A Igm Nonreact  Hep A total ab, IgA and M --  Hep B core Ab total --  Hep B core IgM Nonreact  Hep B DNA PCR --  Hep BSAg --  Hep BSAb --  Hep BSAb --  HCV Ab --  HCV RNA Log --  HCV RNA interp --                RADIOLOGY & ADDITIONAL STUDIES:

## 2019-06-02 NOTE — PROGRESS NOTE ADULT - SUBJECTIVE AND OBJECTIVE BOX
INTERVAL HPI/OVERNIGHT EVENTS:   This patient is an 86 year old male with hx. of CAD, s/p CABG about 5 years ago who presented with weakness and persistent diarrhea. CT of abdomen and Pelvis showed ? early sbo, thickened colon and Pelvic inflammation. The patient was receiving a bowel prep which he was having some difficulty taking. He acutely developed respiratory distress, ? vomiting ? aspiration and than had cardiac arrest Code Blue was called.  On arrival cpr was started , patient was emergently intubated. Patient received 1 epinephrine, approximately 5 minutes of cpr. Then ROSC was obtained. Patient did awake and follow commands after arrest, OG tube was placed. ekg no acute st changes.  Focused echo, good lv function, not on pressors    5/28: Patient following commands, Worsening L Infiltrate on CXR, Weaned for a while this morning  5/29: CXR improved and weaned well and extubated this AM.  Still with significant NGT drainage overnight  5/30: Doing well extubated.  Decreasing NGT drainage.  Coughing up some blood.    5/31: on NC O2, Increased PVC on CXR.  For EGD and Sigmoidoscopy today  6/1: Tolerating PO, No other events  6/2/19- Patient seen and examined at bedside with son. Patient states he feels tired/weak, no acute overnight events.      MEDICATIONS  (STANDING):  aspirin 325 milliGRAM(s) Oral daily  atorvastatin 20 milliGRAM(s) Oral at bedtime  famotidine    Tablet 20 milliGRAM(s) Oral two times a day  heparin  Injectable 5000 Unit(s) SubCutaneous every 8 hours  metoprolol tartrate 25 milliGRAM(s) Oral every 8 hours  multivitamin 1 Tablet(s) Oral daily  nystatin Powder 1 Application(s) Topical two times a day  piperacillin/tazobactam IVPB. 3.375 Gram(s) IV Intermittent every 8 hours  potassium chloride   Powder 40 milliEquivalent(s) Oral every 4 hours    MEDICATIONS  (PRN):  acetaminophen   Tablet .. 650 milliGRAM(s) Oral every 6 hours PRN Temp greater or equal to 38C (100.4F), Mild Pain (1 - 3)  loperamide 2 milliGRAM(s) Oral every 6 hours PRN Diarrhea  prochlorperazine   Injectable 5 milliGRAM(s) IV Push every 4 hours PRN nausea  simethicone 80 milliGRAM(s) Chew every 8 hours PRN Gas  zinc oxide 40% Ointment 1 Application(s) Topical every 4 hours PRN incontinent of stool and urine  zolpidem 5 milliGRAM(s) Oral at bedtime PRN Insomnia      Allergies    No Known Allergies    Intolerances      ROS:  CONSTITUTIONAL: + weakness, no fevers or chills  EYES/ENT: No visual changes;  No vertigo or throat pain   NECK: No pain or stiffness  RESPIRATORY: No cough, wheezing, hemoptysis; No shortness of breath  CARDIOVASCULAR: No chest pain or palpitations  GASTROINTESTINAL: No abdominal or epigastric pain. No nausea, vomiting, or hematemesis  GENITOURINARY: No dysuria, frequency or hematuria  NEUROLOGICAL: No numbness  SKIN: No itching, burning, rashes, or lesions   All other review of systems is negative unless indicated above.    Vital Signs Last 24 Hrs  T(C): 36.8 (02 Jun 2019 13:01), Max: 36.8 (01 Jun 2019 21:51)  T(F): 98.2 (02 Jun 2019 13:01), Max: 98.2 (01 Jun 2019 21:51)  HR: 85 (02 Jun 2019 13:01) (79 - 111)  BP: 142/73 (02 Jun 2019 13:01) (102/50 - 147/85)  BP(mean): --  RR: 20 (02 Jun 2019 13:01) (20 - 22)  SpO2: 92% (02 Jun 2019 13:01) (92% - 98%)    Physical Exam:  General: frail, weak  Neurology: A&Ox3, nonfocal, GREEN x 4  Respiratory: coarse breath sounds B/L  CV: RRR, S1S2  Abdominal: Soft, NT, ND +BS  Extremities: +edema B/L LE, + peripheral pulses      LABS:                        10.4   8.01  )-----------( 219      ( 02 Jun 2019 05:59 )             33.3     06-02    147<H>  |  109<H>  |  14  ----------------------------<  105<H>  3.0<L>   |  31  |  0.80    Ca    8.5      02 Jun 2019 05:59  Phos  3.2     06-02  Mg     1.7     06-02            RADIOLOGY & ADDITIONAL TESTS:

## 2019-06-03 LAB
ANION GAP SERPL CALC-SCNC: 7 MMOL/L — SIGNIFICANT CHANGE UP (ref 5–17)
BUN SERPL-MCNC: 14 MG/DL — SIGNIFICANT CHANGE UP (ref 7–23)
CALCIUM SERPL-MCNC: 8.6 MG/DL — SIGNIFICANT CHANGE UP (ref 8.5–10.1)
CHLORIDE SERPL-SCNC: 108 MMOL/L — SIGNIFICANT CHANGE UP (ref 96–108)
CO2 SERPL-SCNC: 32 MMOL/L — HIGH (ref 22–31)
CREAT SERPL-MCNC: 0.82 MG/DL — SIGNIFICANT CHANGE UP (ref 0.5–1.3)
GLUCOSE SERPL-MCNC: 98 MG/DL — SIGNIFICANT CHANGE UP (ref 70–99)
HCT VFR BLD CALC: 34.5 % — LOW (ref 39–50)
HGB BLD-MCNC: 10.8 G/DL — LOW (ref 13–17)
MAGNESIUM SERPL-MCNC: 1.7 MG/DL — SIGNIFICANT CHANGE UP (ref 1.6–2.6)
MCHC RBC-ENTMCNC: 28.7 PG — SIGNIFICANT CHANGE UP (ref 27–34)
MCHC RBC-ENTMCNC: 31.3 GM/DL — LOW (ref 32–36)
MCV RBC AUTO: 91.8 FL — SIGNIFICANT CHANGE UP (ref 80–100)
PHOSPHATE SERPL-MCNC: 3.2 MG/DL — SIGNIFICANT CHANGE UP (ref 2.5–4.5)
PLATELET # BLD AUTO: 226 K/UL — SIGNIFICANT CHANGE UP (ref 150–400)
POTASSIUM SERPL-MCNC: 3.2 MMOL/L — LOW (ref 3.5–5.3)
POTASSIUM SERPL-SCNC: 3.2 MMOL/L — LOW (ref 3.5–5.3)
RBC # BLD: 3.76 M/UL — LOW (ref 4.2–5.8)
RBC # FLD: 14 % — SIGNIFICANT CHANGE UP (ref 10.3–14.5)
SODIUM SERPL-SCNC: 147 MMOL/L — HIGH (ref 135–145)
WBC # BLD: 7.82 K/UL — SIGNIFICANT CHANGE UP (ref 3.8–10.5)
WBC # FLD AUTO: 7.82 K/UL — SIGNIFICANT CHANGE UP (ref 3.8–10.5)

## 2019-06-03 PROCEDURE — 99232 SBSQ HOSP IP/OBS MODERATE 35: CPT

## 2019-06-03 RX ORDER — METOPROLOL TARTRATE 50 MG
50 TABLET ORAL
Refills: 0 | Status: DISCONTINUED | OUTPATIENT
Start: 2019-06-03 | End: 2019-06-11

## 2019-06-03 RX ORDER — POTASSIUM CHLORIDE 20 MEQ
10 PACKET (EA) ORAL
Refills: 0 | Status: COMPLETED | OUTPATIENT
Start: 2019-06-03 | End: 2019-06-03

## 2019-06-03 RX ADMIN — Medication 325 MILLIGRAM(S): at 13:32

## 2019-06-03 RX ADMIN — Medication 100 MILLIEQUIVALENT(S): at 14:00

## 2019-06-03 RX ADMIN — HEPARIN SODIUM 5000 UNIT(S): 5000 INJECTION INTRAVENOUS; SUBCUTANEOUS at 21:34

## 2019-06-03 RX ADMIN — Medication 100 MILLIEQUIVALENT(S): at 13:20

## 2019-06-03 RX ADMIN — NYSTATIN CREAM 1 APPLICATION(S): 100000 CREAM TOPICAL at 18:21

## 2019-06-03 RX ADMIN — FAMOTIDINE 20 MILLIGRAM(S): 10 INJECTION INTRAVENOUS at 05:57

## 2019-06-03 RX ADMIN — ZOLPIDEM TARTRATE 5 MILLIGRAM(S): 10 TABLET ORAL at 21:34

## 2019-06-03 RX ADMIN — Medication 1 TABLET(S): at 13:32

## 2019-06-03 RX ADMIN — NYSTATIN CREAM 1 APPLICATION(S): 100000 CREAM TOPICAL at 05:58

## 2019-06-03 RX ADMIN — Medication 50 MILLIGRAM(S): at 18:21

## 2019-06-03 RX ADMIN — PIPERACILLIN AND TAZOBACTAM 25 GRAM(S): 4; .5 INJECTION, POWDER, LYOPHILIZED, FOR SOLUTION INTRAVENOUS at 05:57

## 2019-06-03 RX ADMIN — Medication 100 MILLIEQUIVALENT(S): at 13:00

## 2019-06-03 RX ADMIN — ATORVASTATIN CALCIUM 20 MILLIGRAM(S): 80 TABLET, FILM COATED ORAL at 21:34

## 2019-06-03 RX ADMIN — HEPARIN SODIUM 5000 UNIT(S): 5000 INJECTION INTRAVENOUS; SUBCUTANEOUS at 05:57

## 2019-06-03 RX ADMIN — FAMOTIDINE 20 MILLIGRAM(S): 10 INJECTION INTRAVENOUS at 18:21

## 2019-06-03 RX ADMIN — PIPERACILLIN AND TAZOBACTAM 25 GRAM(S): 4; .5 INJECTION, POWDER, LYOPHILIZED, FOR SOLUTION INTRAVENOUS at 21:34

## 2019-06-03 RX ADMIN — HEPARIN SODIUM 5000 UNIT(S): 5000 INJECTION INTRAVENOUS; SUBCUTANEOUS at 13:32

## 2019-06-03 RX ADMIN — PIPERACILLIN AND TAZOBACTAM 25 GRAM(S): 4; .5 INJECTION, POWDER, LYOPHILIZED, FOR SOLUTION INTRAVENOUS at 13:30

## 2019-06-03 RX ADMIN — Medication 25 MILLIGRAM(S): at 05:57

## 2019-06-03 NOTE — SWALLOW BEDSIDE ASSESSMENT ADULT - ASR SWALLOW LABIAL MOBILITY
A generalized reduction in effort/agility were noted upon attempts to execute volitional labial actions but no overt lip focality was evident on exam.

## 2019-06-03 NOTE — SWALLOW BEDSIDE ASSESSMENT ADULT - SWALLOW EVAL: DIAGNOSIS
1) The pt exhibits oropharyngeal Dysphagia which [id number] appears to be a grossly functional condition with a restricted inventory of modified food textures. Overt aspiration signs were demonstrated with liquids less viscous than honey consistency. Effects from acute medical deconditioning are felt to be contributory(i.e pancolitis, hematuria/hydro, left pneumonia, cardiac arrest, respiratory failure warranting transient intubation).   2) Pt is alert. Affect is flat. He is interactive but communicatively passive. Pt was able to verbalize during communicative probes. At these times, his motor speech abilities were felt to be grossly functional and his utterances were linguistically intact/contextually appropriate. However, his verbalizations were often brief and he is essentially Aphonic(no voice).  Effects from, acute medical deconditioning(i.e PNA, cardiac arrest, transient intubation for respiratory failure) with metabolic encephalopathy are felt to be contributory. 1) The pt exhibits Oropharyngeal Dysphagia which subjectively appears to be a grossly functional condition with a restricted inventory of modified food textures. Overt aspiration signs were demonstrated with liquids less viscous than honey consistency. Effects from acute medical deconditioning are felt to be contributory(i.e pancolitis, hematuria/hydro, left pneumonia, cardiac arrest, respiratory failure warranting transient intubation, etc).   2) Pt is alert. Affect is flat. He is interactive but communicatively passive. Pt was able to verbalize during communicative probes. At these times, his motor speech abilities were felt to be grossly functional and his utterances were linguistically intact/contextually appropriate. However, his verbalizations were often brief and he is essentially Aphonic(no voice).  Effects from, acute medical deconditioning(i.e PNA, cardiac arrest, transient intubation for respiratory failure, etc) with metabolic encephalopathy are felt to be contributory.

## 2019-06-03 NOTE — PROGRESS NOTE ADULT - SUBJECTIVE AND OBJECTIVE BOX
Patient is a 86y old  Male who presents with a chief complaint of diarrhea (03 Jun 2019 13:10)    pt seen earlier this AM  d/w pt's daughter and nurse    HPI:  pt feeling very weak  decreased appetite  weak voice though may be slowly improving  some loose stool - per nurse small amount liquid stool few times today    PAST MEDICAL & SURGICAL HISTORY:  HLD (hyperlipidemia)  HTN (hypertension)  Osteoarthrosis  Hyperlipidemia  Hypertension  CAD (coronary artery disease)  H/O total knee replacement, right  History of cholecystectomy  S/P CABG x 5      MEDICATIONS  (STANDING):  aspirin 325 milliGRAM(s) Oral daily  atorvastatin 20 milliGRAM(s) Oral at bedtime  famotidine    Tablet 20 milliGRAM(s) Oral two times a day  heparin  Injectable 5000 Unit(s) SubCutaneous every 8 hours  metoprolol tartrate 50 milliGRAM(s) Oral two times a day  multivitamin 1 Tablet(s) Oral daily  nystatin Powder 1 Application(s) Topical two times a day  piperacillin/tazobactam IVPB. 3.375 Gram(s) IV Intermittent every 8 hours  potassium chloride  10 mEq/100 mL IVPB 10 milliEquivalent(s) IV Intermittent every 1 hour    MEDICATIONS  (PRN):  acetaminophen   Tablet .. 650 milliGRAM(s) Oral every 6 hours PRN Temp greater or equal to 38C (100.4F), Mild Pain (1 - 3)  loperamide 2 milliGRAM(s) Oral every 6 hours PRN Diarrhea  prochlorperazine   Injectable 5 milliGRAM(s) IV Push every 4 hours PRN nausea  simethicone 80 milliGRAM(s) Chew every 8 hours PRN Gas  zinc oxide 40% Ointment 1 Application(s) Topical every 4 hours PRN incontinent of stool and urine  zolpidem 5 milliGRAM(s) Oral at bedtime PRN Insomnia    Allergies  No Known Allergies    REVIEW OF SYSTEMS:    CONSTITUTIONAL: weakness  RESPIRATORY: mild SOB  CARDIOVASCULAR: No chest pain or palpitations  GASTROINTESTINAL: as above  All other review of systems is negative unless indicated above.    Vital Signs Last 24 Hrs  T(C): 36.6 (03 Jun 2019 12:35), Max: 36.6 (02 Jun 2019 22:03)  T(F): 97.9 (03 Jun 2019 12:35), Max: 97.9 (03 Jun 2019 12:35)  HR: 70 (03 Jun 2019 12:35) (70 - 101)  BP: 143/64 (03 Jun 2019 12:35) (127/58 - 143/64)  BP(mean): --  RR: 18 (03 Jun 2019 12:35) (18 - 19)  SpO2: 98% (03 Jun 2019 12:35) (97% - 98%)    PHYSICAL EXAM:    Constitutional: appears chronically ill, NAD  Respiratory: rhonchi more on left  Cardiovascular: S1 and S2  Gastrointestinal: BS+, soft, mildly distended but no focal tenderness      LABS:                        10.8   7.82  )-----------( 226      ( 03 Jun 2019 07:16 )             34.5     06-03    147<H>  |  108  |  14  ----------------------------<  98  3.2<L>   |  32<H>  |  0.82    Ca    8.6      03 Jun 2019 07:16  Phos  3.2     06-03  Mg     1.7     06-03            RADIOLOGY & ADDITIONAL STUDIES:

## 2019-06-03 NOTE — PROGRESS NOTE ADULT - ASSESSMENT
87yo male with colitis, n/v  unclear etiology   does no appear to be IBD and stool studies negative  swallow eval noted-  encourage PO   await biopsy results

## 2019-06-03 NOTE — SWALLOW BEDSIDE ASSESSMENT ADULT - ADDITIONAL RECOMMENDATIONS
1)  NUTRITION FOLLOW UP IS WARRANTED. PT'S PROFILE PLACES HIM AT NUTRITION RISK.     2) HOSPITALIST FOLLOW UP. IF FUNCTIONAL VOICE RESTORATION DOES NOT OCCUR IN THE NEAR FUTURE THAN WOULD OBTIAN AN ENT CONSULT TO R/O A VOCAL CORD/FOLD PATHOLOGY. 1)  NUTRITION FOLLOW UP IS WARRANTED. PT'S PROFILE PLACES HIM AT NUTRITION RISK.     2) HOSPITALIST FOLLOW UP. IF FUNCTIONAL VOICE RESTORATION DOES NOT OCCUR IN THE NEAR FUTURE THAN WOULD OBTAIN AN ENT CONSULT TO R/O A VOCAL CORD/FOLD PATHOLOGY.    3) CONSIDER NEURO CONSULT TO R/O ANOXIC BRAIN INJURY AS CONTRIBUTORY TO HIS DYSPHAGIA.

## 2019-06-03 NOTE — SWALLOW BEDSIDE ASSESSMENT ADULT - ASR SWALLOW RECOMMEND DIAG
DEFER MBS AS THE PT APPEARED CLINICALLY TOLERANT OF SUGGESTED PO TEXTURES FROM AN OROPHARYNGEAL SWALLOWING STANCE, DYSPHAGIA WITH A PROPENSITY TO FLUCTUATE IN SEVERITY GIVEN PROFILE AND CONSIDERING HIS REDUCED STIMULABILITY FOR EFFECTIVE USE OF COMPENSATORY SWALLOWING MANEUVERS WHEN PROBED.

## 2019-06-03 NOTE — PROGRESS NOTE ADULT - ASSESSMENT
86 M with CAD, HTN CHol  with resp arrest req intubation after vomiting and likely aspiration      CE are minimally elevated - check serial enzymes  echo reveal perserved EF, no significnat valve disease     AT- will increase lopressor for PACs and tachycardia     no urgent need for angiogram at this time    will follow as needed

## 2019-06-03 NOTE — SWALLOW BEDSIDE ASSESSMENT ADULT - NS SPL SWALLOW CLINIC TRIAL FT
Thin liquids and solids were not offered given the severity of pt's Dysphagia. Odynophagia was denied.

## 2019-06-03 NOTE — PROGRESS NOTE ADULT - SUBJECTIVE AND OBJECTIVE BOX
INTERVAL HPI/OVERNIGHT EVENTS:  This patient is an 86 year old male with hx. of CAD, s/p CABG about 5 years ago who presented with weakness and persistent diarrhea. CT of abdomen and Pelvis showed ? early sbo, thickened colon and Pelvic inflammation. The patient was receiving a bowel prep which he was having some difficulty taking. He acutely developed respiratory distress, ? vomiting ? aspiration and than had cardiac arrest Code Blue was called.  On arrival cpr was started , patient was emergently intubated. Patient received 1 epinephrine, approximately 5 minutes of cpr. Then ROSC was obtained. Patient did awake and follow commands after arrest, OG tube was placed. ekg no acute st changes.  Focused echo, good lv function, not on pressors    5/28: Patient following commands, Worsening L Infiltrate on CXR, Weaned for a while this morning  5/29: CXR improved and weaned well and extubated this AM.  Still with significant NGT drainage overnight  5/30: Doing well extubated.  Decreasing NGT drainage.  Coughing up some blood.    5/31: on NC O2, Increased PVC on CXR.  For EGD and Sigmoidoscopy today  6/1: Tolerating PO, No other events  6/2/19- Patient seen and examined at bedside with son. Patient states he feels tired/weak, no acute overnight events.  6/3/19- Patient seen and examined at bedside with son. Patient states he feels alright, patient tired, having trouble speaking, but able to communicate with hand gestures and writing. Patient states he feels hungry today.    MEDICATIONS  (STANDING):  aspirin 325 milliGRAM(s) Oral daily  atorvastatin 20 milliGRAM(s) Oral at bedtime  famotidine    Tablet 20 milliGRAM(s) Oral two times a day  heparin  Injectable 5000 Unit(s) SubCutaneous every 8 hours  metoprolol tartrate 50 milliGRAM(s) Oral two times a day  multivitamin 1 Tablet(s) Oral daily  nystatin Powder 1 Application(s) Topical two times a day  piperacillin/tazobactam IVPB. 3.375 Gram(s) IV Intermittent every 8 hours    MEDICATIONS  (PRN):  acetaminophen   Tablet .. 650 milliGRAM(s) Oral every 6 hours PRN Temp greater or equal to 38C (100.4F), Mild Pain (1 - 3)  loperamide 2 milliGRAM(s) Oral every 6 hours PRN Diarrhea  prochlorperazine   Injectable 5 milliGRAM(s) IV Push every 4 hours PRN nausea  simethicone 80 milliGRAM(s) Chew every 8 hours PRN Gas  zinc oxide 40% Ointment 1 Application(s) Topical every 4 hours PRN incontinent of stool and urine  zolpidem 5 milliGRAM(s) Oral at bedtime PRN Insomnia      Allergies    No Known Allergies    Intolerances        ROS:  CONSTITUTIONAL: + weakness, no fevers or chills  EYES/ENT: No visual changes;  No vertigo or throat pain   NECK: No pain or stiffness  RESPIRATORY: No cough, wheezing, hemoptysis; No shortness of breath  CARDIOVASCULAR: No chest pain or palpitations  GASTROINTESTINAL: No abdominal or epigastric pain. No nausea, vomiting, or hematemesis  GENITOURINARY: No dysuria, frequency or hematuria  NEUROLOGICAL: No numbness  SKIN: No itching, burning, rashes, or lesions   All other review of systems is negative unless indicated above.    Vital Signs Last 24 Hrs  T(C): 36.6 (03 Jun 2019 12:35), Max: 36.6 (02 Jun 2019 22:03)  T(F): 97.9 (03 Jun 2019 12:35), Max: 97.9 (03 Jun 2019 12:35)  HR: 70 (03 Jun 2019 12:35) (70 - 101)  BP: 143/64 (03 Jun 2019 12:35) (127/58 - 143/64)  BP(mean): --  RR: 18 (03 Jun 2019 12:35) (18 - 19)  SpO2: 98% (03 Jun 2019 12:35) (97% - 98%)      Physical Exam:  General: frail, weak  Neurology: A&Ox3, nonfocal, GREEN x 4  Respiratory: coarse breath sounds B/L  CV: RRR, S1S2  Abdominal: Soft, NT, ND +BS  Extremities: +edema B/L LE, + peripheral pulses      LABS:                        10.8   7.82  )-----------( 226      ( 03 Jun 2019 07:16 )             34.5     06-03    147<H>  |  108  |  14  ----------------------------<  98  3.2<L>   |  32<H>  |  0.82    Ca    8.6      03 Jun 2019 07:16  Phos  3.2     06-03  Mg     1.7     06-03            RADIOLOGY & ADDITIONAL TESTS:

## 2019-06-03 NOTE — SWALLOW BEDSIDE ASSESSMENT ADULT - COMMENTS
The patient's selected hospital course is notable for diarrhea with pancolitis/pelvic inflammation on imaging, hematuria with underlying hydroureteronephrosis/bladder wall thickening on imaging, anemia, and onset of emesis with post vomiting left pneumonia as well as cardiac arrest for which code blue was called. He was transiently intubated and is now extubated. This profile is superimposed upon a prior history of CAD s/p CABG, HTN, OA s/p right TKR, and past choley. The patient's selected hospital course is notable for diarrhea with pancolitis/pelvic inflammation on imaging, hematuria with underlying hydroureteronephrosis/bladder wall thickening on imaging, anemia, and onset of emesis with post vomiting left pneumonia as well as cardiac arrest for which code blue was called. He was transiently intubated and is now extubated. This profile is superimposed upon a prior history of CAD s/p CABG, HTN, HLD, OA s/p right TKR, and past choley.

## 2019-06-03 NOTE — SWALLOW BEDSIDE ASSESSMENT ADULT - SWALLOW EVAL: RECOMMENDED DIET
SUGGEST A DYSPHAGIA 1 DIET WITH HONEY THICK LIQUIDS AS THIS IS THE LEAST RESTRICTIVE TOLERABLE DIET CONSISTENCIES FROM AN OROPHARYNGEAL SWALLOWING PERSPECTIVE ON CLINICAL EXAM. NOTE THAT IF PT NEEDS TO BE A ON A LIQUID DIET FOR GI REASONS, THAN HER LIQUIDS SHOULD BE THICKENED TO A HONEY CONSISTENCY. SUGGEST A DYSPHAGIA 1 DIET WITH HONEY THICK LIQUIDS AS THIS IS THE LEAST RESTRICTIVE TOLERABLE DIET CONSISTENCIES FROM AN OROPHARYNGEAL SWALLOWING PERSPECTIVE ON CLINICAL EXAM. NOTE THAT IF PT NEEDS TO BE A ON A LIQUID DIET FOR GI REASONS, THAN HIS LIQUIDS SHOULD BE THICKENED TO A HONEY CONSISTENCY. CEASE PO IF ASPIRATION SIGNS ARE NOTED.

## 2019-06-03 NOTE — SWALLOW BEDSIDE ASSESSMENT ADULT - SLP GENERAL OBSERVATIONS
On encounter, the patient was somewhat congested.  His facies were reddened.  Pt is alert. Affect is flat. He is interactive but communicatively passive. Pt was able to verbalize during communicative probes. At these times, his motor speech abilities were felt to be grossly functional and his utterances were linguistically intact/contextually appropriate. However, his verbalizations were often brief and he is essentially Aphonic(no voice).  Effects from, acute medical deconditioning(i.e PNA, cardiac arrest, transient intubation for respiratory failure) with metabolic encephalopathy are felt to be contributory.

## 2019-06-03 NOTE — PROGRESS NOTE ADULT - ASSESSMENT
87yo/M with PMH CAD s/p CABG in 2005, HTN, hyperlipidemia, OA s/p RT TKR, admitted on 5/21 for evaluation of 4 weeks diarrhea that started as pasty stool but progressed to liquid watery stool. No prior antibiotics, no recent travel or food ingestion. No sick contacts and notes improvement in diarrhea since starting antibiotics. No abdominal pain and no fever. No nausea or vomiting.  1. Patient admitted with diarrhea, found to have pan colitis, infectious versus inflammatory  - patient evaluation complicated by aspiration while preparing bowel prep, then cardiac arrest, intubation  - day #9 zosyn for colitis but will cover for aspiration pneumonia as well  - tolerating antibiotics without rashes or side effects   - will discuss with GI regarding when to stop antibiotics  2. other issues; per medicine

## 2019-06-03 NOTE — SWALLOW BEDSIDE ASSESSMENT ADULT - ASR SWALLOW LINGUAL MOBILITY
A generalized reduction in effort/agility were noted upon attempts to execute volitional lingual actions but no overt tongue focality was evident on exam.

## 2019-06-03 NOTE — PROGRESS NOTE ADULT - SUBJECTIVE AND OBJECTIVE BOX
CHIEF COMPLAINT: Patient is a 86y old  Male who presents with a chief complaint of diarrhea (27 May 2019 20:09)      HPI:  85yo/M with PMH CAD s/p CABG in 2005, HTN, hyperlipidemia, OA s/p RT TKR, presented for evaluation of diarrhea. Patient states he had symptoms for almost a month, had been seen by GI as outpatient and had cdiff done about a week ago that was negative. He continues to have diarrhea now associated with urinary incontinence at time and dysuria. Patient was seen by DR Key as outpatient and supposed to have cystoscopy with biopsy last week but cancelled 2 to not feeling well with diarrhea and feeling weak. He denies chest pain or SOB. No fever or chills. Feels abdomen is distended and uncomfortable (21 May 2019 16:24)   aspirated / vomited -resp arrest now intubated in ICU    5/29 improving hemodynamics  5/30 extubated and hemodynamically stable    6/3 AT on monitor     PMHx: PAST MEDICAL & SURGICAL HISTORY:  HLD (hyperlipidemia)  HTN (hypertension)  Osteoarthrosis  Hyperlipidemia  Hypertension  CAD (coronary artery disease)  H/O total knee replacement, right  History of cholecystectomy  S/P CABG x 5        Soc Hx:  no toxic habits      Allergies: Allergies    No Known Allergies    Intolerances    MEDICATIONS  (STANDING):  aspirin 325 milliGRAM(s) Oral daily  atorvastatin 20 milliGRAM(s) Oral at bedtime  famotidine    Tablet 20 milliGRAM(s) Oral two times a day  heparin  Injectable 5000 Unit(s) SubCutaneous every 8 hours  metoprolol tartrate 25 milliGRAM(s) Oral every 8 hours  multivitamin 1 Tablet(s) Oral daily  nystatin Powder 1 Application(s) Topical two times a day  piperacillin/tazobactam IVPB. 3.375 Gram(s) IV Intermittent every 8 hours        REVIEW OF SYSTEMS:    CONSTITUTIONAL: No weakness, fevers or chills  EYES/ENT: No visual changes;  No vertigo or throat pain   NECK: No pain or stiffness  RESPIRATORY: No cough, wheezing, hemoptysis; No shortness of breath  CARDIOVASCULAR: No chest pain or palpitations  GASTROINTESTINAL: No abdominal or epigastric pain. No nausea, vomiting, or hematemesis; No diarrhea or constipation. No melena or hematochezia.  GENITOURINARY: No dysuria, frequency or hematuria  NEUROLOGICAL: No numbness or weakness  SKIN: No itching, burning, rashes, or lesions   All other review of systems is negative unless indicated above    ICU Vital Signs Last 24 Hrs  T(C): 36.3 (03 Jun 2019 05:29), Max: 36.8 (02 Jun 2019 13:01)  T(F): 97.3 (03 Jun 2019 05:29), Max: 98.2 (02 Jun 2019 13:01)  HR: 86 (03 Jun 2019 05:29) (85 - 101)  BP: 140/62 (03 Jun 2019 05:29) (127/58 - 142/73)  BP(mean): --  ABP: --  ABP(mean): --  RR: 18 (03 Jun 2019 05:29) (18 - 20)  SpO2: 97% (03 Jun 2019 05:29) (92% - 97%)          PHYSICAL EXAM:   Constitutional: NAD, awake and alert, well-developed  HEENT: PERR, EOMI, Normal Hearing, MMM  Neck: Soft and supple, No LAD, No JVD  Respiratory: Breath sounds are clear bilaterally, No wheezing, rales or rhonchi  Cardiovascular: S1 and S2, regular rate and rhythm, no Murmurs, gallops or rubs  Gastrointestinal: Bowel Sounds present, soft, nontender, nondistended, no guarding, no rebound  Extremities: No peripheral edema  Vascular: 2+ peripheral pulses  Neurological: A/O x 3, no focal deficits  Musculoskeletal: 5/5 strength b/l upper and lower extremities  Skin: No rashes    ICU Vital Signs Last 24 Hrs  T(C): 37.1 (30 May 2019 04:00), Max: 37.7 (29 May 2019 18:00)  T(F): 98.8 (30 May 2019 04:00), Max: 99.8 (29 May 2019 18:00)  HR: 109 (30 May 2019 06:00) (85 - 128)  BP: 146/82 (30 May 2019 06:00) (117/49 - 162/82)  BP(mean): 101 (30 May 2019 06:00) (70 - 105)  ABP: --  ABP(mean): --  RR: 28 (30 May 2019 06:00) (15 - 31)  SpO2: 90% (30 May 2019 06:00) (90% - 100%)    LABS: All Labs Reviewed:                                          10.4   8.01  )-----------( 219      ( 02 Jun 2019 05:59 )             33.3   06-02    147<H>  |  109<H>  |  14  ----------------------------<  105<H>  3.0<L>   |  31  |  0.80    Ca    8.5      02 Jun 2019 05:59  Phos  3.2     06-02  Mg     1.7     06-02        Blood Culture: Organism --  Gram Stain Blood -- Gram Stain --  Specimen Source .Stool Feces palomo damian  Culture-Blood --        EKG: ST, RBBB, LPFB, possible inferior infarct, no signficiat ST changes     Telemetry: SR , breif AT    ECHO:- < from: Transthoracic Echocardiogram (05.28.19 @ 08:46) >   Summary     Endocardium is not always well visualized, however, overall left   ventricular systolic function appears grossly normal. Technically   Difficult Study.   Estimated left ventricular ejection fraction is 55-60 %.   Fibrocalcific changes noted to the Aortic valve leaflets with preserved   leaflet excursion.   No aortic regurgitation is present.   There is thickening and calcification of anterior mitral valve leaflet.   The leaflet opening is normal.   Mild mitral annular calcification is present.   The mitral valve leaflets appear thickened.   EA reversal of the mitral inflow consistent with reduced compliance of   the   left ventricle.   Normal appearing tricuspid valve structure and function.   Trace tricuspid valve regurgitation is present.   Pleural effusion cannot be ruled out.     Signature     ----------------------------------------------------------------   Electronically signed by Dale Morrissey MD(Interpreting   physician) on 05/28/2019 08:05 PM   ----------------------------------------------------------------    < end of copied text >

## 2019-06-03 NOTE — SWALLOW BEDSIDE ASSESSMENT ADULT - ORAL PHASE
Bolus formation/transfer were achieved via mildly to moderately prolonged but functional lingual pumping actions. Piecemeal deglutition was evident. Mild tongue debris noted with pureed foods only.

## 2019-06-03 NOTE — PROGRESS NOTE ADULT - SUBJECTIVE AND OBJECTIVE BOX
Patient is a 86y old  Male who presents with a chief complaint of diarrhea (22 May 2019 07:38)        Date of service: 06-03-19 @ 13:12      Patient is very weak  Afebrile   Events of weekend noted      ROS unable to obtain secondary to patient medical condition     MEDICATIONS  (STANDING):  aspirin 325 milliGRAM(s) Oral daily  atorvastatin 20 milliGRAM(s) Oral at bedtime  famotidine    Tablet 20 milliGRAM(s) Oral two times a day  heparin  Injectable 5000 Unit(s) SubCutaneous every 8 hours  metoprolol tartrate 50 milliGRAM(s) Oral two times a day  multivitamin 1 Tablet(s) Oral daily  nystatin Powder 1 Application(s) Topical two times a day  piperacillin/tazobactam IVPB. 3.375 Gram(s) IV Intermittent every 8 hours  potassium chloride  10 mEq/100 mL IVPB 10 milliEquivalent(s) IV Intermittent every 1 hour    MEDICATIONS  (PRN):  acetaminophen   Tablet .. 650 milliGRAM(s) Oral every 6 hours PRN Temp greater or equal to 38C (100.4F), Mild Pain (1 - 3)  loperamide 2 milliGRAM(s) Oral every 6 hours PRN Diarrhea  prochlorperazine   Injectable 5 milliGRAM(s) IV Push every 4 hours PRN nausea  simethicone 80 milliGRAM(s) Chew every 8 hours PRN Gas  zinc oxide 40% Ointment 1 Application(s) Topical every 4 hours PRN incontinent of stool and urine  zolpidem 5 milliGRAM(s) Oral at bedtime PRN Insomnia      Vital Signs Last 24 Hrs  T(C): 36.6 (03 Jun 2019 12:35), Max: 36.6 (02 Jun 2019 22:03)  T(F): 97.9 (03 Jun 2019 12:35), Max: 97.9 (03 Jun 2019 12:35)  HR: 70 (03 Jun 2019 12:35) (70 - 101)  BP: 143/64 (03 Jun 2019 12:35) (127/58 - 143/64)  BP(mean): --  RR: 18 (03 Jun 2019 12:35) (18 - 19)  SpO2: 98% (03 Jun 2019 12:35) (97% - 98%)    Physical Exam:        PE:    Constitutional: frail looking  HEENT: NC/AT, EOMI  Neck: supple; thyroid not palpable  Back: no tenderness  Respiratory: respiratory effort normal; scattered coarse breath sounds  Cardiovascular: S1S2 regular, no murmurs  Abdomen: soft, not tender, not distended, positive BS; no liver or spleen organomegaly  Genitourinary: no suprapubic tenderness  Musculoskeletal: no muscle tenderness, no joint swelling or tenderness  Neurological/ Psychiatric:   moving all extremities  Skin: no rashes; no palpable lesions    Labs: all available labs reviewed                        Labs:                    Labs:             Labs:                         Labs:                        10.8   7.82  )-----------( 226      ( 03 Jun 2019 07:16 )             34.5     06-03    147<H>  |  108  |  14  ----------------------------<  98  3.2<L>   |  32<H>  |  0.82    Ca    8.6      03 Jun 2019 07:16  Phos  3.2     06-03  Mg     1.7     06-03             Cultures:             Clostridium difficile Toxin by PCR (05.21.19 @ 20:40)    Clostridium difficile Toxin by PCR: The results of this test should be interpreted with consideration of all  clinical and laboratory findings. This test determines the presence of  the C. difficile tcdB gene at a given time and is not intended to  identify antibiotic associated disease or C. difficile infection without  clinical context.  Successful treatment is based on the resolution of clinical symptoms.  This test should not be used as a "test of cure" because C. difficile DNA  will persist after successful treatment. Repeat testing will not be  permitted.    This test is performed on the BD MAX system using Real-Time PCR and  fluorogenic target-specific hybridization.    C Diff by PCR Result: NotDetec        < from: CT Abdomen and Pelvis w/ Oral Cont and w/ IV Cont (05.24.19 @ 12:53) >    EXAM:  CT ABDOMEN AND PELVIS OC IC                            PROCEDURE DATE:  05/24/2019          INTERPRETATION:  CLINICAL INFORMATION: Colitis with worsening abdominal   distention    COMPARISON: CT scan of the abdomen and pelvis from May 21, 2019    PROCEDURE:   CT of the Abdomen and Pelvis was performed with intravenous contrast.   Intravenous contrast: 90 ml Omnipaque 350. 10 ml discarded.  Oral contrast: None.  Sagittal and coronal reformats were performed.    FINDINGS:    LOWER CHEST: There is interval development of small bilateral pleural   effusions with adjacent atelectasis. Patient is status post sternotomy   and CABG.    LIVER: Within normal limits.  BILE DUCTS: Normal caliber.  GALLBLADDER: Cholecystectomy clips are present increased artifact in the   surrounding region.  SPLEEN: Within normal limits.  PANCREAS: There are a few calcifications within the tail of the pancreas.   Chronic pancreatitis cannot be excluded. Please correlate clinically.  ADRENALS: Within normal limits.  KIDNEYS/URETERS: Right renal cyst measuring up to 2.1 cm in the upper   pole laterally. Bilateral mild-to-moderate hydronephrosis and mild   hydroureter to the level of the pelvis. This is unchanged.    BLADDER: Again noted is marked asymmetric wall thickening of the right   side of the bladder extending anteriorly and posteriorly. Underlying   neoplastic process cannot be excluded.  REPRODUCTIVE ORGANS: Small calcifications in the prostate gland.   Infiltrative soft tissue changes in the presacral and perirectal region   are unchanged.    BOWEL: There is no bowel obstruction. Again noted is wall thickening   throughout the colon which is mild in appearance. There are regions of   underdistended sigmoid colon which limit evaluation. Appendix not well   visualized. No focal inflammatory changes are seen. There is mild   nodularity and wall thickening in the gastric antrum. Underlying neoplasm   cannot be excluded.  PERITONEUM: Mild ascites has increased. There is mild soft tissue   stranding in the left lateral pelvis which is unchanged. Bilateral   inguinal hernias containing fat are present.  VESSELS:  There are vascular calcifications. There is mild dilatation of   the distal abdominal aorta measuring up to approximately 2.5 cm when   compared with 2.0 cm more superiorly.  RETROPERITONEUM: No lymphadenopathy.    ABDOMINAL WALL: There is mild subcutaneous edema.  BONES: Degenerative changes of bone are present.    IMPRESSION:     Mild wall thickening of the colon raising concern for colitis. This does   not appear significantly changed. Suggestion of wall thickening with mild   nodularity in the gastric antrum. Underlying neoplasm cannot be excluded.    Progression of mild ascites and interval development of small bilateral   pleural effusions. Presacral and perirectal soft tissue stranding and   soft tissue stranding in the left lateral pelvis are unchanged when   compared with the prior study.    Asymmetric wall thickening of the right side of the bladder extending   posteriorly and anteriorly. Underlying neoplastic process, be excluded.   Bilateral mild to moderate hydronephrosis and mild hydroureter which may   be related to the bladder process. Please correlate clinically.    Additional findings as above.        < end of copied text >                < from: CT Abdomen and Pelvis w/ Oral Cont and w/ IV Cont (05.21.19 @ 14:15) >    EXAM:  CT ABDOMEN AND PELVIS OC IC                            PROCEDURE DATE:  05/21/2019          INTERPRETATION:  DATE: 5/21/2019 2:15 PM  COMPARISON: None  CLINICAL INFORMATION: Diarrhea, abdominal distention  PROCEDURE:  CT of the Abdomen and Pelvis was performed withintravenous   contrast.  90 ml of Omnipaque 350 was injected intravenously. Oral   contrast was administered        FINDINGS:    LOWER CHEST: Coronary vascular calcification. Trace right pleural   effusion with minimal bibasilar atelectasis.    ABDOMEN AND PELVIS:    LIVER: within normal limits.  BILE DUCTS: Minimal biliary dilatation.  GALLBLADDER: Prior cholecystectomy.  PANCREAS: within normal limits.  SPLEEN: within normal limits.    ADRENALS: within normal limits.  KIDNEYS, URETERS AND BLADDER: Kidneys enhance bilaterally and   symmetrically. Bilateral chronic UPJ obstructions. Bilateral parapelvic   cysts. Exophytic anterior right upper pole renal cyst. Additional   subcentimeter hypodensities bilaterally to smallto characterize. No   intrarenal calculi. Ureters unremarkable. Marked thickening of the right   lateral and anterior bladder wall.    REPRODUCTIVE ORGANS: Extensive infiltrative changes in the pelvis and   presacral region with additional small ascites. Infiltrative changes   along the bilateral pelvic sidewall.    BOWEL: Pancolonic thickening with pericolonic infiltrative changes   compatible with pancolitis, differential including infectious versus   inflammatory etiology with consideration for C. Difficile infection.   Mildly prominent small bowel loops without evidence of obstruction.   Normal appendix. Thickening along the gastric antrum, may represent   gastritis however recommend direct visualization to exclude mucosal   abnormality.    PERITONEUM: no trace to small ascites. Extensive pelvic infiltrative   changes. No free air. No discrete drainable fluid collections.    VESSELS: Atherosclerotic changes .  RETROPERITONEUM: Within normal limits.      ABDOMINAL WALL: within normal limits.  BONES: Degenerative changes and osteopenia.    IMPRESSION:      Pancolonic thickening with pericolonic infiltrative changes compatible   with pancolitis, differential including infectious versus inflammatory   etiology with consideration for C. Difficile infection. Mildly prominent   small bowel loops without evidence of obstruction    Thickening along the gastric antrum, may represent gastritis however   recommend direct visualization to exclude mucosal abnormality.      Marked thickeningalong the right lateral and anterior bladder wall   recommend correlation with urinalysis as well as direct visualization to   exclude neoplasm.    Extensive infiltrate changes in the pelvic fat nonspecific in nature.        < end of copied text >          Radiology: all available radiological tests reviewed    Advanced directives addressed: full resuscitation

## 2019-06-03 NOTE — CHART NOTE - NSCHARTNOTEFT_GEN_A_CORE
Assessment:   Pt has been feeling weak  Pt seen by SLP today,  diet recommended was Puree, HT liquids, pt currently on this diet  Suggest add Vit C and Zn for woud healing  Pt now on that diet.  Pt was being fed by sons,  is receptive to the new diet.    Suggest maintain this diet  continue with Ensure enlive and gelatein  suggest new weight  continue to monitor PO intake, labs, meds.         Diet Prescription: Diet, Dysphagia 1 Pureed-Honey Consistency Fluid (06-03-19 @ 12:13)    Initial Assessment 5/22  **Based on above pt meets criteria for severe malnutrition in the context of acute illness.   Pt noted w/ stage 3 pressure ulcer on right buttocks requiring increased protein intake to promote wound healing. Iftikhar 19.    No c/o chewing/swallowing difficulties. Recommendations: 1. add ensure enlive 1x/day and gelatein 1x/day.   2. encourage intake at each meal w/ adequate protein. 3. biweekly wt.   4. add 500 mg vitamin c daily and 220 mg zinc BID x 10days to promote wound healing.    Wt Hx:  Height (cm): 177.8 (05-21-19 @ 19:11)  Weight (kg): 97.3 (05-21-19 @ 19:11)  BMI (kg/m2): 30.8 (05-21-19 @ 19:11)    Pertinent Medications: MEDICATIONS  (STANDING):  aspirin 325 milliGRAM(s) Oral daily  atorvastatin 20 milliGRAM(s) Oral at bedtime  famotidine    Tablet 20 milliGRAM(s) Oral two times a day  heparin  Injectable 5000 Unit(s) SubCutaneous every 8 hours  metoprolol tartrate 50 milliGRAM(s) Oral two times a day  multivitamin 1 Tablet(s) Oral daily  nystatin Powder 1 Application(s) Topical two times a day  piperacillin/tazobactam IVPB. 3.375 Gram(s) IV Intermittent every 8 hours  potassium chloride  10 mEq/100 mL IVPB 10 milliEquivalent(s) IV Intermittent every 1 hour    MEDICATIONS  (PRN):  acetaminophen   Tablet .. 650 milliGRAM(s) Oral every 6 hours PRN Temp greater or equal to 38C (100.4F), Mild Pain (1 - 3)  loperamide 2 milliGRAM(s) Oral every 6 hours PRN Diarrhea  prochlorperazine   Injectable 5 milliGRAM(s) IV Push every 4 hours PRN nausea  simethicone 80 milliGRAM(s) Chew every 8 hours PRN Gas  zinc oxide 40% Ointment 1 Application(s) Topical every 4 hours PRN incontinent of stool and urine  zolpidem 5 milliGRAM(s) Oral at bedtime PRN Insomnia    Pertinent Labs: 06-03 Na147 mmol/L<H> Glu 98 mg/dL K+ 3.2 mmol/L<L> Cr  0.82 mg/dL BUN 14 mg/dL 06-03 Phos 3.2 mg/dL     CAPILLARY BLOOD GLUCOSE            Monitoring and Evaluation:   [x] PO intake/Nutr support infusion [ x ] Tolerance to Nutr [ x ] weights [ x ] labs[ x ] follow up per protocol  [ ] other:

## 2019-06-04 LAB
ANION GAP SERPL CALC-SCNC: 7 MMOL/L — SIGNIFICANT CHANGE UP (ref 5–17)
BUN SERPL-MCNC: 14 MG/DL — SIGNIFICANT CHANGE UP (ref 7–23)
CALCIUM SERPL-MCNC: 8.8 MG/DL — SIGNIFICANT CHANGE UP (ref 8.5–10.1)
CALPROTECTIN STL-MCNT: <16 UG/G — SIGNIFICANT CHANGE UP (ref 0–120)
CHLORIDE SERPL-SCNC: 105 MMOL/L — SIGNIFICANT CHANGE UP (ref 96–108)
CO2 SERPL-SCNC: 31 MMOL/L — SIGNIFICANT CHANGE UP (ref 22–31)
CREAT SERPL-MCNC: 1.12 MG/DL — SIGNIFICANT CHANGE UP (ref 0.5–1.3)
GLUCOSE SERPL-MCNC: 105 MG/DL — HIGH (ref 70–99)
HCT VFR BLD CALC: 34.6 % — LOW (ref 39–50)
HGB BLD-MCNC: 10.8 G/DL — LOW (ref 13–17)
MCHC RBC-ENTMCNC: 29 PG — SIGNIFICANT CHANGE UP (ref 27–34)
MCHC RBC-ENTMCNC: 31.2 GM/DL — LOW (ref 32–36)
MCV RBC AUTO: 92.8 FL — SIGNIFICANT CHANGE UP (ref 80–100)
PLATELET # BLD AUTO: 241 K/UL — SIGNIFICANT CHANGE UP (ref 150–400)
POTASSIUM SERPL-MCNC: 3.3 MMOL/L — LOW (ref 3.5–5.3)
POTASSIUM SERPL-SCNC: 3.3 MMOL/L — LOW (ref 3.5–5.3)
RBC # BLD: 3.73 M/UL — LOW (ref 4.2–5.8)
RBC # FLD: 14 % — SIGNIFICANT CHANGE UP (ref 10.3–14.5)
SODIUM SERPL-SCNC: 143 MMOL/L — SIGNIFICANT CHANGE UP (ref 135–145)
WBC # BLD: 8.03 K/UL — SIGNIFICANT CHANGE UP (ref 3.8–10.5)
WBC # FLD AUTO: 8.03 K/UL — SIGNIFICANT CHANGE UP (ref 3.8–10.5)

## 2019-06-04 PROCEDURE — 99231 SBSQ HOSP IP/OBS SF/LOW 25: CPT

## 2019-06-04 RX ORDER — POTASSIUM CHLORIDE 20 MEQ
10 PACKET (EA) ORAL
Refills: 0 | Status: COMPLETED | OUTPATIENT
Start: 2019-06-04 | End: 2019-06-04

## 2019-06-04 RX ADMIN — HEPARIN SODIUM 5000 UNIT(S): 5000 INJECTION INTRAVENOUS; SUBCUTANEOUS at 14:30

## 2019-06-04 RX ADMIN — HEPARIN SODIUM 5000 UNIT(S): 5000 INJECTION INTRAVENOUS; SUBCUTANEOUS at 22:08

## 2019-06-04 RX ADMIN — Medication 100 MILLIEQUIVALENT(S): at 12:50

## 2019-06-04 RX ADMIN — FAMOTIDINE 20 MILLIGRAM(S): 10 INJECTION INTRAVENOUS at 06:49

## 2019-06-04 RX ADMIN — HEPARIN SODIUM 5000 UNIT(S): 5000 INJECTION INTRAVENOUS; SUBCUTANEOUS at 06:49

## 2019-06-04 RX ADMIN — Medication 100 MILLIEQUIVALENT(S): at 17:27

## 2019-06-04 RX ADMIN — PIPERACILLIN AND TAZOBACTAM 25 GRAM(S): 4; .5 INJECTION, POWDER, LYOPHILIZED, FOR SOLUTION INTRAVENOUS at 06:49

## 2019-06-04 RX ADMIN — ZOLPIDEM TARTRATE 5 MILLIGRAM(S): 10 TABLET ORAL at 22:08

## 2019-06-04 RX ADMIN — NYSTATIN CREAM 1 APPLICATION(S): 100000 CREAM TOPICAL at 06:51

## 2019-06-04 RX ADMIN — Medication 325 MILLIGRAM(S): at 12:51

## 2019-06-04 RX ADMIN — Medication 100 MILLIEQUIVALENT(S): at 15:24

## 2019-06-04 RX ADMIN — NYSTATIN CREAM 1 APPLICATION(S): 100000 CREAM TOPICAL at 17:30

## 2019-06-04 RX ADMIN — ATORVASTATIN CALCIUM 20 MILLIGRAM(S): 80 TABLET, FILM COATED ORAL at 22:08

## 2019-06-04 RX ADMIN — Medication 50 MILLIGRAM(S): at 17:28

## 2019-06-04 RX ADMIN — Medication 1 TABLET(S): at 12:51

## 2019-06-04 RX ADMIN — FAMOTIDINE 20 MILLIGRAM(S): 10 INJECTION INTRAVENOUS at 17:30

## 2019-06-04 RX ADMIN — Medication 50 MILLIGRAM(S): at 06:49

## 2019-06-04 NOTE — PROGRESS NOTE ADULT - SUBJECTIVE AND OBJECTIVE BOX
INTERVAL HPI/OVERNIGHT EVENTS:      MEDICATIONS  (STANDING):  aspirin 325 milliGRAM(s) Oral daily  atorvastatin 20 milliGRAM(s) Oral at bedtime  famotidine    Tablet 20 milliGRAM(s) Oral two times a day  heparin  Injectable 5000 Unit(s) SubCutaneous every 8 hours  metoprolol tartrate 50 milliGRAM(s) Oral two times a day  multivitamin 1 Tablet(s) Oral daily  nystatin Powder 1 Application(s) Topical two times a day  potassium chloride  10 mEq/100 mL IVPB 10 milliEquivalent(s) IV Intermittent every 1 hour    MEDICATIONS  (PRN):  acetaminophen   Tablet .. 650 milliGRAM(s) Oral every 6 hours PRN Temp greater or equal to 38C (100.4F), Mild Pain (1 - 3)  loperamide 2 milliGRAM(s) Oral every 6 hours PRN Diarrhea  prochlorperazine   Injectable 5 milliGRAM(s) IV Push every 4 hours PRN nausea  simethicone 80 milliGRAM(s) Chew every 8 hours PRN Gas  zinc oxide 40% Ointment 1 Application(s) Topical every 4 hours PRN incontinent of stool and urine  zolpidem 5 milliGRAM(s) Oral at bedtime PRN Insomnia      Allergies    No Known Allergies    Intolerances        CONSTITUTIONAL: No weakness, fevers or chills  EYES/ENT: No visual changes;  No vertigo or throat pain   NECK: No pain or stiffness  RESPIRATORY: No cough, wheezing, hemoptysis; No shortness of breath  CARDIOVASCULAR: No chest pain or palpitations  GASTROINTESTINAL: No abdominal or epigastric pain. No nausea, vomiting, or hematemesis; No diarrhea or constipation. No melena or hematochezia.  GENITOURINARY: No dysuria, frequency or hematuria  NEUROLOGICAL: No numbness or weakness  SKIN: No itching, burning, rashes, or lesions   All other review of systems is negative unless indicated above.    Vital Signs Last 24 Hrs  T(C): 36.7 (04 Jun 2019 11:51), Max: 36.7 (04 Jun 2019 11:51)  T(F): 98.1 (04 Jun 2019 11:51), Max: 98.1 (04 Jun 2019 11:51)  HR: 82 (04 Jun 2019 11:51) (76 - 89)  BP: 125/63 (04 Jun 2019 11:51) (125/63 - 143/55)  BP(mean): --  RR: 18 (04 Jun 2019 11:51) (18 - 18)  SpO2: 95% (04 Jun 2019 11:51) (95% - 97%)      General: WN/WD NAD  Neurology: A&Ox3, nonfocal, GREEN x 4  Respiratory: CTA B/L  CV: RRR, S1S2, no murmurs, rubs or gallops  Abdominal: Soft, NT, ND +BS, Last BM  Extremities: No edema, + peripheral pulses      LABS:                        10.8   8.03  )-----------( 241      ( 04 Jun 2019 07:45 )             34.6     06-04    143  |  105  |  14  ----------------------------<  105<H>  3.3<L>   |  31  |  1.12    Ca    8.8      04 Jun 2019 07:45  Phos  3.2     06-03  Mg     1.7     06-03            RADIOLOGY & ADDITIONAL TESTS: INTERVAL HPI/OVERNIGHT EVENTS:   This patient is an 86 year old male with hx. of CAD, s/p CABG about 5 years ago who presented with weakness and persistent diarrhea. CT of abdomen and Pelvis showed ? early sbo, thickened colon and Pelvic inflammation. The patient was receiving a bowel prep which he was having some difficulty taking. He acutely developed respiratory distress, ? vomiting ? aspiration and than had cardiac arrest Code Blue was called.  On arrival cpr was started , patient was emergently intubated. Patient received 1 epinephrine, approximately 5 minutes of cpr. Then ROSC was obtained. Patient did awake and follow commands after arrest, OG tube was placed. ekg no acute st changes.  Focused echo, good lv function, not on pressors    5/28: Patient following commands, Worsening L Infiltrate on CXR, Weaned for a while this morning  5/29: CXR improved and weaned well and extubated this AM.  Still with significant NGT drainage overnight  5/30: Doing well extubated.  Decreasing NGT drainage.  Coughing up some blood.    5/31: on NC O2, Increased PVC on CXR.  For EGD and Sigmoidoscopy today  6/1: Tolerating PO, No other events  6/2/19- Patient seen and examined at bedside with son. Patient states he feels tired/weak, no acute overnight events.  6/3/19- Patient seen and examined at bedside with son. Patient states he feels alright, patient tired, having trouble speaking, but able to communicate with hand gestures and writing. Patient states he feels hungry today.  6/4/19- Patient seen and examined at bedside. Patient resting comfortably in bed, but looks weak. Patient still with trouble speaking. Family states patient eating some food. Patient denies any CP, SOB.    MEDICATIONS  (STANDING):  aspirin 325 milliGRAM(s) Oral daily  atorvastatin 20 milliGRAM(s) Oral at bedtime  famotidine    Tablet 20 milliGRAM(s) Oral two times a day  heparin  Injectable 5000 Unit(s) SubCutaneous every 8 hours  metoprolol tartrate 50 milliGRAM(s) Oral two times a day  multivitamin 1 Tablet(s) Oral daily  nystatin Powder 1 Application(s) Topical two times a day  potassium chloride  10 mEq/100 mL IVPB 10 milliEquivalent(s) IV Intermittent every 1 hour    MEDICATIONS  (PRN):  acetaminophen   Tablet .. 650 milliGRAM(s) Oral every 6 hours PRN Temp greater or equal to 38C (100.4F), Mild Pain (1 - 3)  loperamide 2 milliGRAM(s) Oral every 6 hours PRN Diarrhea  prochlorperazine   Injectable 5 milliGRAM(s) IV Push every 4 hours PRN nausea  simethicone 80 milliGRAM(s) Chew every 8 hours PRN Gas  zinc oxide 40% Ointment 1 Application(s) Topical every 4 hours PRN incontinent of stool and urine  zolpidem 5 milliGRAM(s) Oral at bedtime PRN Insomnia      Allergies    No Known Allergies    Intolerances      ROS:  CONSTITUTIONAL: + weakness, no fevers or chills  EYES/ENT: No visual changes;  No vertigo or throat pain   NECK: No pain or stiffness  RESPIRATORY: No cough, wheezing, hemoptysis; No shortness of breath  CARDIOVASCULAR: No chest pain or palpitations  GASTROINTESTINAL: No abdominal or epigastric pain. No nausea, vomiting, or hematemesis  GENITOURINARY: No dysuria, frequency or hematuria  NEUROLOGICAL: No numbness  SKIN: No itching, burning, rashes, or lesions   All other review of systems is negative unless indicated above.    Vital Signs Last 24 Hrs  T(C): 36.7 (04 Jun 2019 11:51), Max: 36.7 (04 Jun 2019 11:51)  T(F): 98.1 (04 Jun 2019 11:51), Max: 98.1 (04 Jun 2019 11:51)  HR: 82 (04 Jun 2019 11:51) (76 - 89)  BP: 125/63 (04 Jun 2019 11:51) (125/63 - 143/55)  BP(mean): --  RR: 18 (04 Jun 2019 11:51) (18 - 18)  SpO2: 95% (04 Jun 2019 11:51) (95% - 97%)      Physical Exam:  General: frail, weak  Neurology: A&Ox3, nonfocal, GREEN x 4  Respiratory: coarse breath sounds B/L  CV: RRR, S1S2  Abdominal: Soft, NT, ND +BS  Extremities: +edema B/L LE, + peripheral pulses      LABS:                        10.8   8.03  )-----------( 241      ( 04 Jun 2019 07:45 )             34.6     06-04    143  |  105  |  14  ----------------------------<  105<H>  3.3<L>   |  31  |  1.12    Ca    8.8      04 Jun 2019 07:45  Phos  3.2     06-03  Mg     1.7     06-03            RADIOLOGY & ADDITIONAL TESTS:

## 2019-06-04 NOTE — PROGRESS NOTE ADULT - ASSESSMENT
86 M with CAD, HTN CHol  with resp arrest req intubation after vomiting and likely aspiration- now extubated and improving       CE are minimally elevated - check serial enzymes  echo reveal perserved EF, no significnat valve disease     AT- now with brief AF - will continue lopressor for PACs and tachycardia - if no bleeding risk , consider AC for AF- chads vasc score of 4( age, HTN, CAD)- 4 % yearly risk of CVA      CAD- continue with asa and statin -no urgent need for angiogram at this time- consider ischemic evaluation when infection cleared 86 M with CAD, HTN CHol  with resp arrest req intubation after vomiting and likely aspiration- now extubated and improving       CE are minimally elevated - check serial enzymes  echo reveal perserved EF, no significnat valve disease    LE edema- recommend starting low dose lasix- but would await AM chem to correct electrolytes which were agnormal yesterday- keep k+ > 4-- some of edema is likely due to protein malnutrition      AT- now with brief AF - will continue lopressor for PACs and tachycardia - if no bleeding risk , consider AC for AF- chads vasc score of 4( age, HTN, CAD)- 4 % yearly risk of CVA      CAD- continue with asa and statin -no urgent need for angiogram at this time- consider ischemic evaluation when infection cleared

## 2019-06-04 NOTE — PROGRESS NOTE ADULT - SUBJECTIVE AND OBJECTIVE BOX
Patient is a 86y old  Male who presents with a chief complaint of diarrhea (04 Jun 2019 11:14)      HPI:  pt awake and sitting in bed  his voice is not working well today but he is able to communicate via nodding  he is slowly eating more  some loose stool but low volume    PAST MEDICAL & SURGICAL HISTORY:  HLD (hyperlipidemia)  HTN (hypertension)  Osteoarthrosis  Hyperlipidemia  Hypertension  CAD (coronary artery disease)  H/O total knee replacement, right  History of cholecystectomy  S/P CABG x 5    MEDICATIONS  (STANDING):  aspirin 325 milliGRAM(s) Oral daily  atorvastatin 20 milliGRAM(s) Oral at bedtime  famotidine    Tablet 20 milliGRAM(s) Oral two times a day  heparin  Injectable 5000 Unit(s) SubCutaneous every 8 hours  metoprolol tartrate 50 milliGRAM(s) Oral two times a day  multivitamin 1 Tablet(s) Oral daily  nystatin Powder 1 Application(s) Topical two times a day  potassium chloride  10 mEq/100 mL IVPB 10 milliEquivalent(s) IV Intermittent every 1 hour    MEDICATIONS  (PRN):  acetaminophen   Tablet .. 650 milliGRAM(s) Oral every 6 hours PRN Temp greater or equal to 38C (100.4F), Mild Pain (1 - 3)  loperamide 2 milliGRAM(s) Oral every 6 hours PRN Diarrhea  prochlorperazine   Injectable 5 milliGRAM(s) IV Push every 4 hours PRN nausea  simethicone 80 milliGRAM(s) Chew every 8 hours PRN Gas  zinc oxide 40% Ointment 1 Application(s) Topical every 4 hours PRN incontinent of stool and urine  zolpidem 5 milliGRAM(s) Oral at bedtime PRN Insomnia    Allergies  No Known Allergies    REVIEW OF SYSTEMS:    CONSTITUTIONAL: weakness  RESPIRATORY: No cough, wheezing, hemoptysis; No shortness of breath  CARDIOVASCULAR: No chest pain or palpitations  GASTROINTESTINAL: as above  All other review of systems is negative unless indicated above.    Vital Signs Last 24 Hrs  T(C): 36.7 (04 Jun 2019 11:51), Max: 36.7 (04 Jun 2019 11:51)  T(F): 98.1 (04 Jun 2019 11:51), Max: 98.1 (04 Jun 2019 11:51)  HR: 82 (04 Jun 2019 11:51) (70 - 89)  BP: 125/63 (04 Jun 2019 11:51) (125/63 - 143/64)  BP(mean): --  RR: 18 (04 Jun 2019 11:51) (18 - 18)  SpO2: 95% (04 Jun 2019 11:51) (95% - 98%)    PHYSICAL EXAM:    Constitutional: sitting in chair appears chronically ill  Respiratory: occ rhonchi left more than right  Cardiovascular: S1 and S2  Gastrointestinal: BS+, soft, NT/ND      LABS:                        10.8   8.03  )-----------( 241      ( 04 Jun 2019 07:45 )             34.6     06-04    143  |  105  |  14  ----------------------------<  105<H>  3.3<L>   |  31  |  1.12    Ca    8.8      04 Jun 2019 07:45  Phos  3.2     06-03  Mg     1.7     06-03            RADIOLOGY & ADDITIONAL STUDIES:

## 2019-06-04 NOTE — PROGRESS NOTE ADULT - ASSESSMENT
85yo male with recent colitis, aspiration pneumonia  slowly improving - less volume of diarrhea - stopping abx  encourage PO  if voice remains issue ENT to see  if continues to tolerate PO well with minimal abdominal pain/distention/diarrhea, will start exploring d/c to rehab  d/w Dr Rodriguez in detail and daughter last PM

## 2019-06-04 NOTE — PROGRESS NOTE ADULT - ASSESSMENT
87yo/M with PMH CAD s/p CABG in 2005, HTN, hyperlipidemia, OA s/p RT TKR, admitted on 5/21 for evaluation of 4 weeks diarrhea that started as pasty stool but progressed to liquid watery stool. No prior antibiotics, no recent travel or food ingestion. No sick contacts and notes improvement in diarrhea since starting antibiotics. No abdominal pain and no fever. No nausea or vomiting.  1. Patient admitted with diarrhea, found to have pan colitis, infectious versus inflammatory  - patient evaluation complicated by aspiration while preparing bowel prep, then cardiac arrest, intubation  - day #10 zosyn for colitis but will cover for aspiration pneumonia as well  - will change to augmentin 500 mg po q 12 hours for 4 more days  - tolerating antibiotics without rashes or side effects   If further ID issues please reconsult   2. other issues; per medicine 85yo/M with PMH CAD s/p CABG in 2005, HTN, hyperlipidemia, OA s/p RT TKR, admitted on 5/21 for evaluation of 4 weeks diarrhea that started as pasty stool but progressed to liquid watery stool. No prior antibiotics, no recent travel or food ingestion. No sick contacts and notes improvement in diarrhea since starting antibiotics. No abdominal pain and no fever. No nausea or vomiting.  1. Patient admitted with diarrhea, found to have pan colitis, infectious versus inflammatory  - patient evaluation complicated by aspiration while preparing bowel prep, then cardiac arrest, intubation  - day #10 zosyn for colitis but will cover for aspiration pneumonia as well  - okay from id standpoint to stop antibiotics, discussed with GI  If further ID issues please reconsult   2. other issues; per medicine

## 2019-06-04 NOTE — PROGRESS NOTE ADULT - SUBJECTIVE AND OBJECTIVE BOX
Patient is a 86y old  Male who presents with a chief complaint of diarrhea (22 May 2019 07:38)          Date of service: 06-04-19 @ 11:14    Patient lying in bed; awake, alert  Weak appearing  Afebrile      ROS: unable to obtain secondary to patient medical condition     MEDICATIONS  (STANDING):  aspirin 325 milliGRAM(s) Oral daily  atorvastatin 20 milliGRAM(s) Oral at bedtime  famotidine    Tablet 20 milliGRAM(s) Oral two times a day  heparin  Injectable 5000 Unit(s) SubCutaneous every 8 hours  metoprolol tartrate 50 milliGRAM(s) Oral two times a day  multivitamin 1 Tablet(s) Oral daily  nystatin Powder 1 Application(s) Topical two times a day  potassium chloride  10 mEq/100 mL IVPB 10 milliEquivalent(s) IV Intermittent every 1 hour    MEDICATIONS  (PRN):  acetaminophen   Tablet .. 650 milliGRAM(s) Oral every 6 hours PRN Temp greater or equal to 38C (100.4F), Mild Pain (1 - 3)  loperamide 2 milliGRAM(s) Oral every 6 hours PRN Diarrhea  prochlorperazine   Injectable 5 milliGRAM(s) IV Push every 4 hours PRN nausea  simethicone 80 milliGRAM(s) Chew every 8 hours PRN Gas  zinc oxide 40% Ointment 1 Application(s) Topical every 4 hours PRN incontinent of stool and urine  zolpidem 5 milliGRAM(s) Oral at bedtime PRN Insomnia      Vital Signs Last 24 Hrs  T(C): 36.4 (04 Jun 2019 05:41), Max: 36.6 (03 Jun 2019 12:35)  T(F): 97.6 (04 Jun 2019 05:41), Max: 97.9 (03 Jun 2019 12:35)  HR: 78 (04 Jun 2019 05:41) (70 - 89)  BP: 136/67 (04 Jun 2019 05:41) (136/67 - 143/64)  BP(mean): --  RR: 18 (04 Jun 2019 05:41) (18 - 18)  SpO2: 97% (04 Jun 2019 05:41) (97% - 98%)    Physical Exam:        Physical Exam:        PE:    Constitutional: frail looking  HEENT: NC/AT, EOMI  Neck: supple; thyroid not palpable  Back: no tenderness  Respiratory: respiratory effort normal; scattered coarse breath sounds  Cardiovascular: S1S2 regular, no murmurs  Abdomen: soft, not tender, not distended, positive BS; no liver or spleen organomegaly  Genitourinary: no suprapubic tenderness  Musculoskeletal: no muscle tenderness, no joint swelling or tenderness  Neurological/ Psychiatric:   moving all extremities  Skin: no rashes; no palpable lesions    Labs: all available labs reviewed                        Labs:                    Labs:             Labs:                         Labs:                        10.8   7.82  )-----------( 226      ( 03 Jun 2019 07:16 )             34.5     06-03    147<H>  |  108  |  14  ----------------------------<  98  3.2<L>   |  32<H>  |  0.82    Ca    8.6      03 Jun 2019 07:16  Phos  3.2     06-03  Mg     1.7     06-03             Cultures:             Clostridium difficile Toxin by PCR (05.21.19 @ 20:40)    Clostridium difficile Toxin by PCR: The results of this test should be interpreted with consideration of all  clinical and laboratory findings. This test determines the presence of  the C. difficile tcdB gene at a given time and is not intended to  identify antibiotic associated disease or C. difficile infection without  clinical context.  Successful treatment is based on the resolution of clinical symptoms.  This test should not be used as a "test of cure" because C. difficile DNA  will persist after successful treatment. Repeat testing will not be  permitted.    This test is performed on the BD MAX system using Real-Time PCR and  fluorogenic target-specific hybridization.    C Diff by PCR Result: NotDetec        < from: CT Abdomen and Pelvis w/ Oral Cont and w/ IV Cont (05.24.19 @ 12:53) >    EXAM:  CT ABDOMEN AND PELVIS OC IC                            PROCEDURE DATE:  05/24/2019          INTERPRETATION:  CLINICAL INFORMATION: Colitis with worsening abdominal   distention    COMPARISON: CT scan of the abdomen and pelvis from May 21, 2019    PROCEDURE:   CT of the Abdomen and Pelvis was performed with intravenous contrast.   Intravenous contrast: 90 ml Omnipaque 350. 10 ml discarded.  Oral contrast: None.  Sagittal and coronal reformats were performed.    FINDINGS:    LOWER CHEST: There is interval development of small bilateral pleural   effusions with adjacent atelectasis. Patient is status post sternotomy   and CABG.    LIVER: Within normal limits.  BILE DUCTS: Normal caliber.  GALLBLADDER: Cholecystectomy clips are present increased artifact in the   surrounding region.  SPLEEN: Within normal limits.  PANCREAS: There are a few calcifications within the tail of the pancreas.   Chronic pancreatitis cannot be excluded. Please correlate clinically.  ADRENALS: Within normal limits.  KIDNEYS/URETERS: Right renal cyst measuring up to 2.1 cm in the upper   pole laterally. Bilateral mild-to-moderate hydronephrosis and mild   hydroureter to the level of the pelvis. This is unchanged.    BLADDER: Again noted is marked asymmetric wall thickening of the right   side of the bladder extending anteriorly and posteriorly. Underlying   neoplastic process cannot be excluded.  REPRODUCTIVE ORGANS: Small calcifications in the prostate gland.   Infiltrative soft tissue changes in the presacral and perirectal region   are unchanged.    BOWEL: There is no bowel obstruction. Again noted is wall thickening   throughout the colon which is mild in appearance. There are regions of   underdistended sigmoid colon which limit evaluation. Appendix not well   visualized. No focal inflammatory changes are seen. There is mild   nodularity and wall thickening in the gastric antrum. Underlying neoplasm   cannot be excluded.  PERITONEUM: Mild ascites has increased. There is mild soft tissue   stranding in the left lateral pelvis which is unchanged. Bilateral   inguinal hernias containing fat are present.  VESSELS:  There are vascular calcifications. There is mild dilatation of   the distal abdominal aorta measuring up to approximately 2.5 cm when   compared with 2.0 cm more superiorly.  RETROPERITONEUM: No lymphadenopathy.    ABDOMINAL WALL: There is mild subcutaneous edema.  BONES: Degenerative changes of bone are present.    IMPRESSION:     Mild wall thickening of the colon raising concern for colitis. This does   not appear significantly changed. Suggestion of wall thickening with mild   nodularity in the gastric antrum. Underlying neoplasm cannot be excluded.    Progression of mild ascites and interval development of small bilateral   pleural effusions. Presacral and perirectal soft tissue stranding and   soft tissue stranding in the left lateral pelvis are unchanged when   compared with the prior study.    Asymmetric wall thickening of the right side of the bladder extending   posteriorly and anteriorly. Underlying neoplastic process, be excluded.   Bilateral mild to moderate hydronephrosis and mild hydroureter which may   be related to the bladder process. Please correlate clinically.    Additional findings as above.        < end of copied text >                < from: CT Abdomen and Pelvis w/ Oral Cont and w/ IV Cont (05.21.19 @ 14:15) >    EXAM:  CT ABDOMEN AND PELVIS OC IC                            PROCEDURE DATE:  05/21/2019          INTERPRETATION:  DATE: 5/21/2019 2:15 PM  COMPARISON: None  CLINICAL INFORMATION: Diarrhea, abdominal distention  PROCEDURE:  CT of the Abdomen and Pelvis was performed withintravenous   contrast.  90 ml of Omnipaque 350 was injected intravenously. Oral   contrast was administered        FINDINGS:    LOWER CHEST: Coronary vascular calcification. Trace right pleural   effusion with minimal bibasilar atelectasis.    ABDOMEN AND PELVIS:    LIVER: within normal limits.  BILE DUCTS: Minimal biliary dilatation.  GALLBLADDER: Prior cholecystectomy.  PANCREAS: within normal limits.  SPLEEN: within normal limits.    ADRENALS: within normal limits.  KIDNEYS, URETERS AND BLADDER: Kidneys enhance bilaterally and   symmetrically. Bilateral chronic UPJ obstructions. Bilateral parapelvic   cysts. Exophytic anterior right upper pole renal cyst. Additional   subcentimeter hypodensities bilaterally to smallto characterize. No   intrarenal calculi. Ureters unremarkable. Marked thickening of the right   lateral and anterior bladder wall.    REPRODUCTIVE ORGANS: Extensive infiltrative changes in the pelvis and   presacral region with additional small ascites. Infiltrative changes   along the bilateral pelvic sidewall.    BOWEL: Pancolonic thickening with pericolonic infiltrative changes   compatible with pancolitis, differential including infectious versus   inflammatory etiology with consideration for C. Difficile infection.   Mildly prominent small bowel loops without evidence of obstruction.   Normal appendix. Thickening along the gastric antrum, may represent   gastritis however recommend direct visualization to exclude mucosal   abnormality.    PERITONEUM: no trace to small ascites. Extensive pelvic infiltrative   changes. No free air. No discrete drainable fluid collections.    VESSELS: Atherosclerotic changes .  RETROPERITONEUM: Within normal limits.      ABDOMINAL WALL: within normal limits.  BONES: Degenerative changes and osteopenia.    IMPRESSION:      Pancolonic thickening with pericolonic infiltrative changes compatible   with pancolitis, differential including infectious versus inflammatory   etiology with consideration for C. Difficile infection. Mildly prominent   small bowel loops without evidence of obstruction    Thickening along the gastric antrum, may represent gastritis however   recommend direct visualization to exclude mucosal abnormality.      Marked thickeningalong the right lateral and anterior bladder wall   recommend correlation with urinalysis as well as direct visualization to   exclude neoplasm.    Extensive infiltrate changes in the pelvic fat nonspecific in nature.        < end of copied text >          Radiology: all available radiological tests reviewed    Advanced directives addressed: full resuscitation

## 2019-06-04 NOTE — PHARMACOTHERAPY INTERVENTION NOTE - COMMENTS
Pt on both lopressor IV and PO. Clarify with MD regarding which one to continue based on feeding status
Pt receiving  ml/hr, now Na increased to 146, recommend to change to 1/2 NS
QTc 528. Recommended DCing zofran and reglan and starting prochlorperazine which has less risk of QTc prolongation
Recommended K repletion
Recommended chlorhexidine and pepcid for prophylaxis since patient is intubated
Recommended potassium repletion
med history complete, reviewed medications with patient and confirmed with doctor first med profile
Recommended dc-ing zosyn

## 2019-06-04 NOTE — PROGRESS NOTE ADULT - SUBJECTIVE AND OBJECTIVE BOX
CHIEF COMPLAINT: Patient is a 86y old  Male who presents with a chief complaint of diarrhea (27 May 2019 20:09)      HPI:  85yo/M with PMH CAD s/p CABG in 2005, HTN, hyperlipidemia, OA s/p RT TKR, presented for evaluation of diarrhea. Patient states he had symptoms for almost a month, had been seen by GI as outpatient and had cdiff done about a week ago that was negative. He continues to have diarrhea now associated with urinary incontinence at time and dysuria. Patient was seen by DR Key as outpatient and supposed to have cystoscopy with biopsy last week but cancelled 2 to not feeling well with diarrhea and feeling weak. He denies chest pain or SOB. No fever or chills. Feels abdomen is distended and uncomfortable (21 May 2019 16:24)   aspirated / vomited -resp arrest now intubated in ICU    5/29 improving hemodynamics  5/30 extubated and hemodynamically stable    6/3 AT on monitor       6/4 - breif AF o monitor     PMHx: PAST MEDICAL & SURGICAL HISTORY:  HLD (hyperlipidemia)  HTN (hypertension)  Osteoarthrosis  Hyperlipidemia  Hypertension  CAD (coronary artery disease)  H/O total knee replacement, right  History of cholecystectomy  S/P CABG x 5        Soc Hx:  no toxic habits      Allergies: Allergies    No Known Allergies    Intolerances    MEDICATIONS  (STANDING):  aspirin 325 milliGRAM(s) Oral daily  atorvastatin 20 milliGRAM(s) Oral at bedtime  famotidine    Tablet 20 milliGRAM(s) Oral two times a day  heparin  Injectable 5000 Unit(s) SubCutaneous every 8 hours  metoprolol tartrate 50 milliGRAM(s) Oral two times a day  multivitamin 1 Tablet(s) Oral daily  nystatin Powder 1 Application(s) Topical two times a day  piperacillin/tazobactam IVPB. 3.375 Gram(s) IV Intermittent every 8 hours        REVIEW OF SYSTEMS:    CONSTITUTIONAL: No weakness, fevers or chills  EYES/ENT: No visual changes;  No vertigo or throat pain   NECK: No pain or stiffness  RESPIRATORY: No cough, wheezing, hemoptysis; No shortness of breath  CARDIOVASCULAR: No chest pain or palpitations  GASTROINTESTINAL: No abdominal or epigastric pain. No nausea, vomiting, or hematemesis; No diarrhea or constipation. No melena or hematochezia.  GENITOURINARY: No dysuria, frequency or hematuria  NEUROLOGICAL: No numbness or weakness  SKIN: No itching, burning, rashes, or lesions   All other review of systems is negative unless indicated above          PHYSICAL EXAM:   Constitutional: NAD, awake and alert, well-developed  HEENT: PERR, EOMI, Normal Hearing, MMM  Neck: Soft and supple, No LAD, No JVD  Respiratory: Breath sounds are clear bilaterally, No wheezing, rales or rhonchi  Cardiovascular: S1 and S2, regular rate and rhythm, no Murmurs, gallops or rubs  Gastrointestinal: Bowel Sounds present, soft, nontender, nondistended, no guarding, no rebound  Extremities: No peripheral edema  Vascular: 2+ peripheral pulses  Neurological: A/O x 3, no focal deficits  Musculoskeletal: 5/5 strength b/l upper and lower extremities  Skin: No rashes    ICU Vital Signs Last 24 Hrs  T(C): 36.4 (04 Jun 2019 05:41), Max: 36.6 (03 Jun 2019 12:35)  T(F): 97.6 (04 Jun 2019 05:41), Max: 97.9 (03 Jun 2019 12:35)  HR: 78 (04 Jun 2019 05:41) (70 - 89)  BP: 136/67 (04 Jun 2019 05:41) (136/67 - 143/64)  BP(mean): --  ABP: --  ABP(mean): --  RR: 18 (04 Jun 2019 05:41) (18 - 18)  SpO2: 97% (04 Jun 2019 05:41) (97% - 98%)    LABS: All Labs Reviewed:                06-03    147<H>  |  108  |  14  ----------------------------<  98  3.2<L>   |  32<H>  |  0.82    Ca    8.6      03 Jun 2019 07:16  Phos  3.2     06-03  Mg     1.7     06-03                          10.8   7.82  )-----------( 226      ( 03 Jun 2019 07:16 )             34.5       Blood Culture: Organism --  Gram Stain Blood -- Gram Stain --  Specimen Source .Stool Feces palomo damian  Culture-Blood --        EKG: ST, RBBB, LPFB, possible inferior infarct, no signficiat ST changes     Telemetry: SR , jace AT    ECHO:- < from: Transthoracic Echocardiogram (05.28.19 @ 08:46) >   Summary     Endocardium is not always well visualized, however, overall left   ventricular systolic function appears grossly normal. Technically   Difficult Study.   Estimated left ventricular ejection fraction is 55-60 %.   Fibrocalcific changes noted to the Aortic valve leaflets with preserved   leaflet excursion.   No aortic regurgitation is present.   There is thickening and calcification of anterior mitral valve leaflet.   The leaflet opening is normal.   Mild mitral annular calcification is present.   The mitral valve leaflets appear thickened.   EA reversal of the mitral inflow consistent with reduced compliance of   the   left ventricle.   Normal appearing tricuspid valve structure and function.   Trace tricuspid valve regurgitation is present.   Pleural effusion cannot be ruled out.     Signature     ----------------------------------------------------------------   Electronically signed by Dale Morrissey MD(Interpreting   physician) on 05/28/2019 08:05 PM   ----------------------------------------------------------------    < end of copied text > CHIEF COMPLAINT: Patient is a 86y old  Male who presents with a chief complaint of diarrhea (27 May 2019 20:09)      HPI:  87yo/M with PMH CAD s/p CABG in 2005, HTN, hyperlipidemia, OA s/p RT TKR, presented for evaluation of diarrhea. Patient states he had symptoms for almost a month, had been seen by GI as outpatient and had cdiff done about a week ago that was negative. He continues to have diarrhea now associated with urinary incontinence at time and dysuria. Patient was seen by DR Key as outpatient and supposed to have cystoscopy with biopsy last week but cancelled 2 to not feeling well with diarrhea and feeling weak. He denies chest pain or SOB. No fever or chills. Feels abdomen is distended and uncomfortable (21 May 2019 16:24)   aspirated / vomited -resp arrest now intubated in ICU    5/29 improving hemodynamics  5/30 extubated and hemodynamically stable    6/3 AT on monitor       6/4 - breif AF o monitor     PMHx: PAST MEDICAL & SURGICAL HISTORY:  HLD (hyperlipidemia)  HTN (hypertension)  Osteoarthrosis  Hyperlipidemia  Hypertension  CAD (coronary artery disease)  H/O total knee replacement, right  History of cholecystectomy  S/P CABG x 5        Soc Hx:  no toxic habits      Allergies: Allergies    No Known Allergies    Intolerances    MEDICATIONS  (STANDING):  aspirin 325 milliGRAM(s) Oral daily  atorvastatin 20 milliGRAM(s) Oral at bedtime  famotidine    Tablet 20 milliGRAM(s) Oral two times a day  heparin  Injectable 5000 Unit(s) SubCutaneous every 8 hours  metoprolol tartrate 50 milliGRAM(s) Oral two times a day  multivitamin 1 Tablet(s) Oral daily  nystatin Powder 1 Application(s) Topical two times a day  piperacillin/tazobactam IVPB. 3.375 Gram(s) IV Intermittent every 8 hours        REVIEW OF SYSTEMS:    All other review of systems is negative unless indicated above          PHYSICAL EXAM:   Constitutional: NAD, awake and alert, well-developed  HEENT: PERR, EOMI, Normal Hearing, MMM  Neck: Soft and supple, No LAD, No JVD  Respiratory: Breath sounds are clear bilaterally, No wheezing, rales or rhonchi  Cardiovascular: S1 and S2, regular rate and rhythm, no Murmurs, gallops or rubs  Gastrointestinal: Bowel Sounds present, soft, nontender, nondistended, no guarding, no rebound  Extremities: 2+peripheral edema  Vascular: 2+ peripheral pulses  Neurological: A/O x 3, no focal deficits  Musculoskeletal: 5/5 strength b/l upper and lower extremities  Skin: No rashes  ICU Vital Signs Last 24 Hrs  T(C): 36.4 (04 Jun 2019 05:41), Max: 36.6 (03 Jun 2019 12:35)  T(F): 97.6 (04 Jun 2019 05:41), Max: 97.9 (03 Jun 2019 12:35)  HR: 78 (04 Jun 2019 05:41) (70 - 89)  BP: 136/67 (04 Jun 2019 05:41) (136/67 - 143/64)  BP(mean): --  ABP: --  ABP(mean): --  RR: 18 (04 Jun 2019 05:41) (18 - 18)  SpO2: 97% (04 Jun 2019 05:41) (97% - 98%)      LABS: All Labs Reviewed:                06-03    147<H>  |  108  |  14  ----------------------------<  98  3.2<L>   |  32<H>  |  0.82    Ca    8.6      03 Jun 2019 07:16  Phos  3.2     06-03  Mg     1.7     06-03                          10.8   7.82  )-----------( 226      ( 03 Jun 2019 07:16 )             34.5       Blood Culture: Organism --  Gram Stain Blood -- Gram Stain --  Specimen Source .Stool Feces palomo damian  Culture-Blood --        EKG: ST, RBBB, LPFB, possible inferior infarct, no signficiat ST changes     Telemetry: SR , breif AT    ECHO:- < from: Transthoracic Echocardiogram (05.28.19 @ 08:46) >   Summary     Endocardium is not always well visualized, however, overall left   ventricular systolic function appears grossly normal. Technically   Difficult Study.   Estimated left ventricular ejection fraction is 55-60 %.   Fibrocalcific changes noted to the Aortic valve leaflets with preserved   leaflet excursion.   No aortic regurgitation is present.   There is thickening and calcification of anterior mitral valve leaflet.   The leaflet opening is normal.   Mild mitral annular calcification is present.   The mitral valve leaflets appear thickened.   EA reversal of the mitral inflow consistent with reduced compliance of   the   left ventricle.   Normal appearing tricuspid valve structure and function.   Trace tricuspid valve regurgitation is present.   Pleural effusion cannot be ruled out.     Signature     ----------------------------------------------------------------   Electronically signed by Dale GREGGInterpreting   physician) on 05/28/2019 08:05 PM   ----------------------------------------------------------------    < end of copied text >

## 2019-06-05 LAB — SURGICAL PATHOLOGY STUDY: SIGNIFICANT CHANGE UP

## 2019-06-05 PROCEDURE — 99222 1ST HOSP IP/OBS MODERATE 55: CPT

## 2019-06-05 PROCEDURE — 99232 SBSQ HOSP IP/OBS MODERATE 35: CPT

## 2019-06-05 RX ORDER — POTASSIUM CHLORIDE 20 MEQ
10 PACKET (EA) ORAL
Refills: 0 | Status: DISCONTINUED | OUTPATIENT
Start: 2019-06-05 | End: 2019-06-05

## 2019-06-05 RX ORDER — POTASSIUM CHLORIDE 20 MEQ
40 PACKET (EA) ORAL EVERY 4 HOURS
Refills: 0 | Status: COMPLETED | OUTPATIENT
Start: 2019-06-05 | End: 2019-06-05

## 2019-06-05 RX ORDER — FUROSEMIDE 40 MG
20 TABLET ORAL ONCE
Refills: 0 | Status: COMPLETED | OUTPATIENT
Start: 2019-06-05 | End: 2019-06-05

## 2019-06-05 RX ADMIN — Medication 50 MILLIGRAM(S): at 06:48

## 2019-06-05 RX ADMIN — SIMETHICONE 80 MILLIGRAM(S): 80 TABLET, CHEWABLE ORAL at 11:21

## 2019-06-05 RX ADMIN — HEPARIN SODIUM 5000 UNIT(S): 5000 INJECTION INTRAVENOUS; SUBCUTANEOUS at 20:56

## 2019-06-05 RX ADMIN — FAMOTIDINE 20 MILLIGRAM(S): 10 INJECTION INTRAVENOUS at 17:53

## 2019-06-05 RX ADMIN — Medication 40 MILLIEQUIVALENT(S): at 13:28

## 2019-06-05 RX ADMIN — Medication 20 MILLIGRAM(S): at 17:52

## 2019-06-05 RX ADMIN — ATORVASTATIN CALCIUM 20 MILLIGRAM(S): 80 TABLET, FILM COATED ORAL at 20:55

## 2019-06-05 RX ADMIN — FAMOTIDINE 20 MILLIGRAM(S): 10 INJECTION INTRAVENOUS at 06:47

## 2019-06-05 RX ADMIN — ZOLPIDEM TARTRATE 5 MILLIGRAM(S): 10 TABLET ORAL at 22:17

## 2019-06-05 RX ADMIN — NYSTATIN CREAM 1 APPLICATION(S): 100000 CREAM TOPICAL at 17:54

## 2019-06-05 RX ADMIN — Medication 1 TABLET(S): at 11:22

## 2019-06-05 RX ADMIN — Medication 40 MILLIEQUIVALENT(S): at 17:52

## 2019-06-05 RX ADMIN — HEPARIN SODIUM 5000 UNIT(S): 5000 INJECTION INTRAVENOUS; SUBCUTANEOUS at 06:47

## 2019-06-05 RX ADMIN — Medication 325 MILLIGRAM(S): at 11:22

## 2019-06-05 RX ADMIN — NYSTATIN CREAM 1 APPLICATION(S): 100000 CREAM TOPICAL at 06:30

## 2019-06-05 RX ADMIN — Medication 50 MILLIGRAM(S): at 17:53

## 2019-06-05 NOTE — PROGRESS NOTE ADULT - ASSESSMENT
#Hypoxic respiratory failure likely from aspiration.   -Continue Zosyn, NC O2  -Speech and swallow consult  -Downgrade from ICU on 6/1/19    #S/P Cardiac arrest  -Cardio consulted, appreciate recs  -Continue ASA 325mg  -Continue BB, statin    #Signet ring cell adenocarcinoma  -Found on rectal biopsy (proximal and distal rectum) during colonoscopy  -Spoke with Heme/Onc- will speak with patient and family tmr  -Colorectal Surgery consulted, appreciate recs    #Diarrhea/nausea/abd distention: due to Pancolitis with extensive pelvic inflammation: Infectious vs inflammatory  -cdiff negative  -GI PCR negative  -repeat CT imaging still with extensive inflammation, no obstruction   -ESR: 34  -supportive care    # H/O hematuria  -CT shows b/l hydroureternephrosis with thickened bladder  -Pt will need outpatient cystoscopy with biopsy w/ Dr. Briseno to r/o malignancy- family requesting reconsult to see if biopsy can be done while inpatient- spoke with Dr. Briseno who stated he will perform biopsy as outpatient    #CAD s/p CABG  Continue aspirin, lipitor    Anemia: Stable  -monitor    #HTN  Stable  Continue lopressor and Vasotec    # DVT prophylaxis-on heparin

## 2019-06-05 NOTE — PROVIDER CONTACT NOTE (OTHER) - SITUATION
Dr. Ayala s/w Peggy from office
Dr. Gann s/cyrus Andre from office
Spoke with Zenon
Cardiac arrest
Juan Smith at answering service of consult
notified dr gamboa's service of admission

## 2019-06-05 NOTE — CONSULT NOTE ADULT - ASSESSMENT
A/P - Rectal ca, ? invasion into bladder.    MRI pelvis for staging purposes.  CEA level.  Once staging completed, will discuss treatment options with family.  Will cont to follow. Thanks.

## 2019-06-05 NOTE — PROGRESS NOTE ADULT - ASSESSMENT
85 yo male with diarrhea and anal stricture. Will review pathology. Awaiting Oncologic/Colorectal Surgical evaluation.

## 2019-06-05 NOTE — PROGRESS NOTE ADULT - SUBJECTIVE AND OBJECTIVE BOX
Patient is a 86y old  Male who presents with a chief complaint of diarrhea (05 Jun 2019 06:46)      HPI:  85yo/M with PMH CAD s/p CABG in 2005, HTN, hyperlipidemia, OA s/p RT TKR, presented for evaluation of diarrhea. Patient states he had symptoms for almost a month, had been seen by GI as outpatient and had cdiff done about a week ago that was negative. He continues to have diarrhea now associated with urinary incontinence at time and dysuria. Patient was seen by DR Key as outpatient and supposed to have cystoscopy with biopsy last week but cancelled 2 to not feeling well with diarrhea and feeling weak. He denies chest pain or SOB. No fever or chills.     Patient appears comfortable. Tolerating diet. No complaints of abdominal pain. Pathology - signet ring cancer.    PAST MEDICAL & SURGICAL HISTORY:  HLD (hyperlipidemia)  HTN (hypertension)  Osteoarthrosis  Hyperlipidemia  Hypertension  CAD (coronary artery disease)  H/O total knee replacement, right  History of cholecystectomy  S/P CABG x 5      MEDICATIONS  (STANDING):  aspirin 325 milliGRAM(s) Oral daily  atorvastatin 20 milliGRAM(s) Oral at bedtime  famotidine    Tablet 20 milliGRAM(s) Oral two times a day  furosemide   Injectable 20 milliGRAM(s) IV Push once  heparin  Injectable 5000 Unit(s) SubCutaneous every 8 hours  metoprolol tartrate 50 milliGRAM(s) Oral two times a day  multivitamin 1 Tablet(s) Oral daily  nystatin Powder 1 Application(s) Topical two times a day  potassium chloride    Tablet ER 40 milliEquivalent(s) Oral every 4 hours    MEDICATIONS  (PRN):  acetaminophen   Tablet .. 650 milliGRAM(s) Oral every 6 hours PRN Temp greater or equal to 38C (100.4F), Mild Pain (1 - 3)  loperamide 2 milliGRAM(s) Oral every 6 hours PRN Diarrhea  prochlorperazine   Injectable 5 milliGRAM(s) IV Push every 4 hours PRN nausea  simethicone 80 milliGRAM(s) Chew every 8 hours PRN Gas  zinc oxide 40% Ointment 1 Application(s) Topical every 4 hours PRN incontinent of stool and urine  zolpidem 5 milliGRAM(s) Oral at bedtime PRN Insomnia      Allergies    No Known Allergies    Intolerances        Vital Signs Last 24 Hrs  T(C): 36.9 (05 Jun 2019 11:21), Max: 36.9 (05 Jun 2019 11:21)  T(F): 98.5 (05 Jun 2019 11:21), Max: 98.5 (05 Jun 2019 11:21)  HR: 78 (05 Jun 2019 11:21) (70 - 84)  BP: 138/75 (05 Jun 2019 11:21) (127/54 - 138/75)  BP(mean): --  RR: 18 (05 Jun 2019 11:21) (16 - 18)  SpO2: 97% (05 Jun 2019 11:21) (97% - 99%)    PHYSICAL EXAM:    chronically ill male in NAD  Respiratory: CTAB  Cardiovascular: S1 and S2, RRR, no M/G/R  Gastrointestinal: BS+, soft, NT/ND  LABS:                        10.8   8.03  )-----------( 241      ( 04 Jun 2019 07:45 )             34.6     06-04    143  |  105  |  14  ----------------------------<  105<H>  3.3<L>   |  31  |  1.12    Ca    8.8      04 Jun 2019 07:45            RADIOLOGY & ADDITIONAL STUDIES:

## 2019-06-05 NOTE — PROGRESS NOTE ADULT - ASSESSMENT
86 M with CAD, HTN CHol  with resp arrest req intubation after vomiting and likely aspiration- now extubated and improving       CE are minimally elevated - check serial enzymes  echo reveal perserved EF, no significnat valve disease    LE edema- recommend starting low dose lasix-  - keep k+ > 4-- some of edema is likely due to protein malnutrition      AT- now with brief AF - will continue lopressor for PACs and tachycardia - if no bleeding risk , consider AC for AF- chads vasc score of 4( age, HTN, CAD)- 4 % yearly risk of CVA    CAD- continue with asa and statin -no urgent need for angiogram at this time- consider ischemic evaluation when infection cleared

## 2019-06-05 NOTE — PROGRESS NOTE ADULT - SUBJECTIVE AND OBJECTIVE BOX
CHIEF COMPLAINT: Patient is a 86y old  Male who presents with a chief complaint of diarrhea (27 May 2019 20:09)      HPI:  87yo/M with PMH CAD s/p CABG in 2005, HTN, hyperlipidemia, OA s/p RT TKR, presented for evaluation of diarrhea. Patient states he had symptoms for almost a month, had been seen by GI as outpatient and had cdiff done about a week ago that was negative. He continues to have diarrhea now associated with urinary incontinence at time and dysuria. Patient was seen by DR Key as outpatient and supposed to have cystoscopy with biopsy last week but cancelled 2 to not feeling well with diarrhea and feeling weak. He denies chest pain or SOB. No fever or chills. Feels abdomen is distended and uncomfortable (21 May 2019 16:24)   aspirated / vomited -resp arrest now intubated in ICU-- subsequent extubation    6/4 - breif AF o monitor   6/5 fatigued appearing, brief AF on monitor    PMHx: PAST MEDICAL & SURGICAL HISTORY:  HLD (hyperlipidemia)  HTN (hypertension)  Osteoarthrosis  Hyperlipidemia  Hypertension  CAD (coronary artery disease)  H/O total knee replacement, right  History of cholecystectomy  S/P CABG x 5        Soc Hx:  no toxic habits      Allergies: Allergies    No Known Allergies    Intolerances    MEDICATIONS  (STANDING):  aspirin 325 milliGRAM(s) Oral daily  atorvastatin 20 milliGRAM(s) Oral at bedtime  famotidine    Tablet 20 milliGRAM(s) Oral two times a day  heparin  Injectable 5000 Unit(s) SubCutaneous every 8 hours  metoprolol tartrate 50 milliGRAM(s) Oral two times a day  multivitamin 1 Tablet(s) Oral daily  nystatin Powder 1 Application(s) Topical two times a day        REVIEW OF SYSTEMS:    All other review of systems is negative unless indicated above          PHYSICAL EXAM:   Constitutional: NAD, awake and alert, well-developed  HEENT: PERR, EOMI, Normal Hearing, MMM  Neck: Soft and supple, No LAD, No JVD  Respiratory: Breath sounds are clear bilaterally, No wheezing, rales or rhonchi  Cardiovascular: S1 and S2, regular rate and rhythm, no Murmurs, gallops or rubs  Gastrointestinal: Bowel Sounds present, soft, nontender, nondistended, no guarding, no rebound  Extremities: 2+peripheral edema  Vascular: 2+ peripheral pulses  Neurological: alert no focal deficits  Musculoskeletal: 5/5 strength b/l upper and lower extremities  Skin: No rashes      ICU Vital Signs Last 24 Hrs  T(C): 36.7 (05 Jun 2019 05:53), Max: 36.7 (04 Jun 2019 11:51)  T(F): 98 (05 Jun 2019 05:53), Max: 98.1 (04 Jun 2019 11:51)  HR: 84 (05 Jun 2019 05:53) (70 - 84)  BP: 131/66 (05 Jun 2019 05:53) (125/63 - 136/74)  BP(mean): --  ABP: --  ABP(mean): --  RR: 17 (05 Jun 2019 05:53) (16 - 18)  SpO2: 97% (05 Jun 2019 05:53) (95% - 99%)      LABS: All Labs Reviewed:                        10.8   8.03  )-----------( 241      ( 04 Jun 2019 07:45 )             34.6   06-04    143  |  105  |  14  ----------------------------<  105<H>  3.3<L>   |  31  |  1.12    Ca    8.8      04 Jun 2019 07:45  Phos  3.2     06-03  Mg     1.7     06-03        Blood Culture: Organism --  Gram Stain Blood -- Gram Stain --  Specimen Source .Stool Feces palomo damian  Culture-Blood --        EKG: ST, RBBB, LPFB, possible inferior infarct, no signficiat ST changes     Telemetry: SR , breif AT    ECHO:- < from: Transthoracic Echocardiogram (05.28.19 @ 08:46) >   Summary     Endocardium is not always well visualized, however, overall left   ventricular systolic function appears grossly normal. Technically   Difficult Study.   Estimated left ventricular ejection fraction is 55-60 %.   Fibrocalcific changes noted to the Aortic valve leaflets with preserved   leaflet excursion.   No aortic regurgitation is present.   There is thickening and calcification of anterior mitral valve leaflet.   The leaflet opening is normal.   Mild mitral annular calcification is present.   The mitral valve leaflets appear thickened.   EA reversal of the mitral inflow consistent with reduced compliance of   the   left ventricle.   Normal appearing tricuspid valve structure and function.   Trace tricuspid valve regurgitation is present.   Pleural effusion cannot be ruled out.     Signature     ----------------------------------------------------------------   Electronically signed by Dale Morrissey MD([facility]   physician) on 05/28/2019 08:05 PM   ----------------------------------------------------------------    < end of copied text >

## 2019-06-05 NOTE — CONSULT NOTE ADULT - SUBJECTIVE AND OBJECTIVE BOX
87yo/M with PMH CAD s/p CABG in 2005, HTN, hyperlipidemia, OA s/p RT TKR, presented for evaluation of diarrhea. Patient states he had symptoms for almost a month, had been seen by GI as outpatient and had cdiff done about a week ago that was negative. He continues to have diarrhea now associated with urinary incontinence at time and dysuria. CT A/P on admission with pancolitis, e/o infiltrative changes to pelvis, asymmetric thickening of bladder possibly from underlying neoplasm, no obvious liver mets. 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 Per discussion with son at bedside, father is now too weak to form words and has not been ambulatory which is not baseline. No FH colorectal cancer.       PAST MEDICAL & SURGICAL HISTORY:  HLD (hyperlipidemia)  HTN (hypertension)  Osteoarthrosis  Hyperlipidemia  Hypertension  CAD (coronary artery disease)  H/O total knee replacement, right  History of cholecystectomy  S/P CABG x 5    FAMILY HISTORY:  Family history of premature CAD    Allergies    No Known Allergies    Intolerances      MEDICATIONS  (STANDING):  aspirin 325 milliGRAM(s) Oral daily  atorvastatin 20 milliGRAM(s) Oral at bedtime  famotidine    Tablet 20 milliGRAM(s) Oral two times a day  furosemide   Injectable 20 milliGRAM(s) IV Push once  heparin  Injectable 5000 Unit(s) SubCutaneous every 8 hours  metoprolol tartrate 50 milliGRAM(s) Oral two times a day  multivitamin 1 Tablet(s) Oral daily  nystatin Powder 1 Application(s) Topical two times a day  potassium chloride    Tablet ER 40 milliEquivalent(s) Oral every 4 hours    MEDICATIONS  (PRN):  acetaminophen   Tablet .. 650 milliGRAM(s) Oral every 6 hours PRN Temp greater or equal to 38C (100.4F), Mild Pain (1 - 3)  loperamide 2 milliGRAM(s) Oral every 6 hours PRN Diarrhea  prochlorperazine   Injectable 5 milliGRAM(s) IV Push every 4 hours PRN nausea  simethicone 80 milliGRAM(s) Chew every 8 hours PRN Gas  zinc oxide 40% Ointment 1 Application(s) Topical every 4 hours PRN incontinent of stool and urine  zolpidem 5 milliGRAM(s) Oral at bedtime PRN Insomnia    Vital Signs Last 24 Hrs  T(C): 36.9 (05 Jun 2019 11:21), Max: 36.9 (05 Jun 2019 11:21)  T(F): 98.5 (05 Jun 2019 11:21), Max: 98.5 (05 Jun 2019 11:21)  HR: 78 (05 Jun 2019 11:21) (70 - 84)  BP: 138/75 (05 Jun 2019 11:21) (127/54 - 138/75)  BP(mean): --  RR: 18 (05 Jun 2019 11:21) (16 - 18)  SpO2: 97% (05 Jun 2019 11:21) (97% - 99%)  I&O's Detail    ABD exam: soft, NTND  OSKAR: firmness at anus with stenosis c/w tumor invading sphincter complex                        10.8   8.03  )-----------( 241      ( 04 Jun 2019 07:45 )             34.6     06-04    143  |  105  |  14  ----------------------------<  105<H>  3.3<L>   |  31  |  1.12    Ca    8.8      04 Jun 2019 07:45  < from: CT Abdomen and Pelvis w/ Oral Cont and w/ IV Cont (05.24.19 @ 12:53) >  EXAM:  CT ABDOMEN AND PELVIS OC IC                            PROCEDURE DATE:  05/24/2019          INTERPRETATION:  CLINICAL INFORMATION: Colitis with worsening abdominal   distention    COMPARISON: CT scan of the abdomen and pelvis from May 21, 2019    PROCEDURE:   CT of the Abdomen and Pelvis was performed with intravenous contrast.   Intravenous contrast: 90 ml Omnipaque 350. 10 ml discarded.  Oral contrast: None.  Sagittal and coronal reformats were performed.    FINDINGS:    LOWER CHEST: There is interval development of small bilateral pleural   effusions with adjacent atelectasis. Patient is status post sternotomy   and CABG.    LIVER: Within normal limits.  BILE DUCTS: Normal caliber.  GALLBLADDER: Cholecystectomy clips are present increased artifact in the   surrounding region.  SPLEEN: Within normal limits.  PANCREAS: There are a few calcifications within the tail of the pancreas.   Chronic pancreatitis cannot be excluded. Please correlate clinically.  ADRENALS: Within normal limits.  KIDNEYS/URETERS: Right renal cyst measuring up to 2.1 cm in the upper   pole laterally. Bilateral mild-to-moderate hydronephrosis and mild   hydroureter to the level of the pelvis. This is unchanged.    BLADDER: Again noted is marked asymmetric wall thickening of the right   side of the bladder extending anteriorly and posteriorly. Underlying   neoplastic process cannot be excluded.  REPRODUCTIVE ORGANS: Small calcifications in the prostate gland.   Infiltrative soft tissue changes in the presacral and perirectal region   are unchanged.    BOWEL: There is no bowel obstruction. Again noted is wall thickening   throughout the colon which is mild in appearance. There are regions of   underdistended sigmoid colon which limit evaluation. Appendix not well   visualized. No focal inflammatory changes are seen. There is mild   nodularity and wall thickening in the gastric antrum. Underlying neoplasm   cannot be excluded.  PERITONEUM: Mild ascites has increased. There is mild soft tissue   stranding in the left lateral pelvis which is unchanged. Bilateral   inguinal hernias containing fat are present.  VESSELS:  There are vascular calcifications. There is mild dilatation of   the distal abdominal aorta measuring up to approximately 2.5 cm when   compared with 2.0 cm more superiorly.  RETROPERITONEUM: No lymphadenopathy.    ABDOMINAL WALL: There is mild subcutaneous edema.  BONES: Degenerative changes of bone are present.    IMPRESSION:     Mild wall thickening of the colon raising concern for colitis. This does   not appear significantly changed. Suggestion of wall thickening with mild   nodularity in the gastric antrum. Underlying neoplasm cannot be excluded.    Progression of mild ascites and interval development of small bilateral   pleural effusions. Presacral and perirectal soft tissue stranding and   soft tissue stranding in the left lateral pelvis are unchanged when   compared with the prior study.    Asymmetric wall thickening of the right side of the bladder extending   posteriorly and anteriorly. Underlying neoplastic process, be excluded.   Bilateral mild to moderate hydronephrosis and mild hydroureter which may   be related to the bladder process. Please correlate clinically.    Additional findings as above.                      YANNA MATTHEW   This document has been electronically signed. May 24 2019  2:20PM              < end of copied text >

## 2019-06-05 NOTE — PROGRESS NOTE ADULT - SUBJECTIVE AND OBJECTIVE BOX
INTERVAL HPI/OVERNIGHT EVENTS:      MEDICATIONS  (STANDING):  aspirin 325 milliGRAM(s) Oral daily  atorvastatin 20 milliGRAM(s) Oral at bedtime  famotidine    Tablet 20 milliGRAM(s) Oral two times a day  furosemide   Injectable 20 milliGRAM(s) IV Push once  heparin  Injectable 5000 Unit(s) SubCutaneous every 8 hours  metoprolol tartrate 50 milliGRAM(s) Oral two times a day  multivitamin 1 Tablet(s) Oral daily  nystatin Powder 1 Application(s) Topical two times a day  potassium chloride    Tablet ER 40 milliEquivalent(s) Oral every 4 hours    MEDICATIONS  (PRN):  acetaminophen   Tablet .. 650 milliGRAM(s) Oral every 6 hours PRN Temp greater or equal to 38C (100.4F), Mild Pain (1 - 3)  loperamide 2 milliGRAM(s) Oral every 6 hours PRN Diarrhea  prochlorperazine   Injectable 5 milliGRAM(s) IV Push every 4 hours PRN nausea  simethicone 80 milliGRAM(s) Chew every 8 hours PRN Gas  zinc oxide 40% Ointment 1 Application(s) Topical every 4 hours PRN incontinent of stool and urine  zolpidem 5 milliGRAM(s) Oral at bedtime PRN Insomnia      Allergies    No Known Allergies    Intolerances        CONSTITUTIONAL: No weakness, fevers or chills  EYES/ENT: No visual changes;  No vertigo or throat pain   NECK: No pain or stiffness  RESPIRATORY: No cough, wheezing, hemoptysis; No shortness of breath  CARDIOVASCULAR: No chest pain or palpitations  GASTROINTESTINAL: No abdominal or epigastric pain. No nausea, vomiting, or hematemesis; No diarrhea or constipation. No melena or hematochezia.  GENITOURINARY: No dysuria, frequency or hematuria  NEUROLOGICAL: No numbness or weakness  SKIN: No itching, burning, rashes, or lesions   All other review of systems is negative unless indicated above.    Vital Signs Last 24 Hrs  T(C): 36.9 (05 Jun 2019 11:21), Max: 36.9 (05 Jun 2019 11:21)  T(F): 98.5 (05 Jun 2019 11:21), Max: 98.5 (05 Jun 2019 11:21)  HR: 78 (05 Jun 2019 11:21) (70 - 84)  BP: 138/75 (05 Jun 2019 11:21) (127/54 - 138/75)  BP(mean): --  RR: 18 (05 Jun 2019 11:21) (16 - 18)  SpO2: 97% (05 Jun 2019 11:21) (97% - 99%)      General: WN/WD NAD  Neurology: A&Ox3, nonfocal, GREEN x 4  Respiratory: CTA B/L  CV: RRR, S1S2, no murmurs, rubs or gallops  Abdominal: Soft, NT, ND +BS, Last BM  Extremities: No edema, + peripheral pulses      LABS:                        10.8   8.03  )-----------( 241      ( 04 Jun 2019 07:45 )             34.6     06-04    143  |  105  |  14  ----------------------------<  105<H>  3.3<L>   |  31  |  1.12    Ca    8.8      04 Jun 2019 07:45            RADIOLOGY & ADDITIONAL TESTS: INTERVAL HPI/OVERNIGHT EVENTS:  This patient is an 86 year old male with hx. of CAD, s/p CABG about 5 years ago who presented with weakness and persistent diarrhea. CT of abdomen and Pelvis showed ? early sbo, thickened colon and Pelvic inflammation. The patient was receiving a bowel prep which he was having some difficulty taking. He acutely developed respiratory distress, ? vomiting ? aspiration and than had cardiac arrest Code Blue was called.  On arrival cpr was started , patient was emergently intubated. Patient received 1 epinephrine, approximately 5 minutes of cpr. Then ROSC was obtained. Patient did awake and follow commands after arrest, OG tube was placed. ekg no acute st changes.  Focused echo, good lv function, not on pressors    5/28: Patient following commands, Worsening L Infiltrate on CXR, Weaned for a while this morning  5/29: CXR improved and weaned well and extubated this AM.  Still with significant NGT drainage overnight  5/30: Doing well extubated.  Decreasing NGT drainage.  Coughing up some blood.    5/31: on NC O2, Increased PVC on CXR.  For EGD and Sigmoidoscopy today  6/1: Tolerating PO, No other events  6/2/19- Patient seen and examined at bedside with son. Patient states he feels tired/weak, no acute overnight events.  6/3/19- Patient seen and examined at bedside with son. Patient states he feels alright, patient tired, having trouble speaking, but able to communicate with hand gestures and writing. Patient states he feels hungry today.  6/4/19- Patient seen and examined at bedside. Patient resting comfortably in bed, but looks weak. Patient still with trouble speaking. Family states patient eating some food. Patient denies any CP, SOB.  6/5/19- Patient seen and examined at bedside. Patient resting in bed comfortably. Complains of mild abdominal pain today. Patient tolerating diet. Spoke with patient and family at bedside regarding pathology results. Explained we would need to run more tests and involve Heme/Onc, Surgery in care going forward. Patient expresses understanding. States "I don't know" when questioned how he felt after hearing the news. Emotional support provided by family. All questions answered.    MEDICATIONS  (STANDING):  aspirin 325 milliGRAM(s) Oral daily  atorvastatin 20 milliGRAM(s) Oral at bedtime  famotidine    Tablet 20 milliGRAM(s) Oral two times a day  furosemide   Injectable 20 milliGRAM(s) IV Push once  heparin  Injectable 5000 Unit(s) SubCutaneous every 8 hours  metoprolol tartrate 50 milliGRAM(s) Oral two times a day  multivitamin 1 Tablet(s) Oral daily  nystatin Powder 1 Application(s) Topical two times a day  potassium chloride    Tablet ER 40 milliEquivalent(s) Oral every 4 hours    MEDICATIONS  (PRN):  acetaminophen   Tablet .. 650 milliGRAM(s) Oral every 6 hours PRN Temp greater or equal to 38C (100.4F), Mild Pain (1 - 3)  loperamide 2 milliGRAM(s) Oral every 6 hours PRN Diarrhea  prochlorperazine   Injectable 5 milliGRAM(s) IV Push every 4 hours PRN nausea  simethicone 80 milliGRAM(s) Chew every 8 hours PRN Gas  zinc oxide 40% Ointment 1 Application(s) Topical every 4 hours PRN incontinent of stool and urine  zolpidem 5 milliGRAM(s) Oral at bedtime PRN Insomnia      Allergies    No Known Allergies    Intolerances        ROS:  CONSTITUTIONAL: + weakness, no fevers or chills  EYES/ENT: No visual changes;  No vertigo or throat pain   NECK: No pain or stiffness  RESPIRATORY: No cough, wheezing, hemoptysis; No shortness of breath  CARDIOVASCULAR: No chest pain or palpitations  GASTROINTESTINAL: +mild abdominal pain. No nausea, vomiting, or hematemesis  GENITOURINARY: No dysuria, frequency or hematuria  NEUROLOGICAL: No numbness  SKIN: No itching, burning, rashes, or lesions   All other review of systems is negative unless indicated above.    Vital Signs Last 24 Hrs  T(C): 36.9 (05 Jun 2019 11:21), Max: 36.9 (05 Jun 2019 11:21)  T(F): 98.5 (05 Jun 2019 11:21), Max: 98.5 (05 Jun 2019 11:21)  HR: 78 (05 Jun 2019 11:21) (70 - 84)  BP: 138/75 (05 Jun 2019 11:21) (127/54 - 138/75)  BP(mean): --  RR: 18 (05 Jun 2019 11:21) (16 - 18)  SpO2: 97% (05 Jun 2019 11:21) (97% - 99%)      Physical Exam:  General: frail, weak  Neurology: A&Ox3, nonfocal, GREEN x 4  Respiratory: coarse breath sounds B/L  CV: RRR, S1S2  Abdominal: Soft, NT, ND +BS  Extremities: +edema B/L LE, + peripheral pulses      LABS:                        10.8   8.03  )-----------( 241      ( 04 Jun 2019 07:45 )             34.6     06-04    143  |  105  |  14  ----------------------------<  105<H>  3.3<L>   |  31  |  1.12    Ca    8.8      04 Jun 2019 07:45            RADIOLOGY & ADDITIONAL TESTS:

## 2019-06-06 DIAGNOSIS — C20 MALIGNANT NEOPLASM OF RECTUM: ICD-10-CM

## 2019-06-06 LAB
ANION GAP SERPL CALC-SCNC: 7 MMOL/L — SIGNIFICANT CHANGE UP (ref 5–17)
BUN SERPL-MCNC: 12 MG/DL — SIGNIFICANT CHANGE UP (ref 7–23)
CALCIUM SERPL-MCNC: 9.3 MG/DL — SIGNIFICANT CHANGE UP (ref 8.5–10.1)
CEA SERPL-MCNC: 284 NG/ML — HIGH (ref 0–3.8)
CHLORIDE SERPL-SCNC: 109 MMOL/L — HIGH (ref 96–108)
CO2 SERPL-SCNC: 32 MMOL/L — HIGH (ref 22–31)
CREAT SERPL-MCNC: 0.91 MG/DL — SIGNIFICANT CHANGE UP (ref 0.5–1.3)
GLUCOSE SERPL-MCNC: 102 MG/DL — HIGH (ref 70–99)
MAGNESIUM SERPL-MCNC: 1.9 MG/DL — SIGNIFICANT CHANGE UP (ref 1.6–2.6)
POTASSIUM SERPL-MCNC: 4 MMOL/L — SIGNIFICANT CHANGE UP (ref 3.5–5.3)
POTASSIUM SERPL-SCNC: 4 MMOL/L — SIGNIFICANT CHANGE UP (ref 3.5–5.3)
SODIUM SERPL-SCNC: 148 MMOL/L — HIGH (ref 135–145)

## 2019-06-06 PROCEDURE — 72197 MRI PELVIS W/O & W/DYE: CPT | Mod: 26

## 2019-06-06 PROCEDURE — 99232 SBSQ HOSP IP/OBS MODERATE 35: CPT

## 2019-06-06 PROCEDURE — 99233 SBSQ HOSP IP/OBS HIGH 50: CPT

## 2019-06-06 RX ADMIN — FAMOTIDINE 20 MILLIGRAM(S): 10 INJECTION INTRAVENOUS at 06:20

## 2019-06-06 RX ADMIN — Medication 1 TABLET(S): at 12:32

## 2019-06-06 RX ADMIN — Medication 325 MILLIGRAM(S): at 12:32

## 2019-06-06 RX ADMIN — ATORVASTATIN CALCIUM 20 MILLIGRAM(S): 80 TABLET, FILM COATED ORAL at 21:32

## 2019-06-06 RX ADMIN — NYSTATIN CREAM 1 APPLICATION(S): 100000 CREAM TOPICAL at 06:20

## 2019-06-06 RX ADMIN — NYSTATIN CREAM 1 APPLICATION(S): 100000 CREAM TOPICAL at 17:37

## 2019-06-06 RX ADMIN — HEPARIN SODIUM 5000 UNIT(S): 5000 INJECTION INTRAVENOUS; SUBCUTANEOUS at 21:32

## 2019-06-06 RX ADMIN — Medication 50 MILLIGRAM(S): at 06:20

## 2019-06-06 RX ADMIN — ZOLPIDEM TARTRATE 5 MILLIGRAM(S): 10 TABLET ORAL at 21:33

## 2019-06-06 RX ADMIN — Medication 50 MILLIGRAM(S): at 17:35

## 2019-06-06 RX ADMIN — FAMOTIDINE 20 MILLIGRAM(S): 10 INJECTION INTRAVENOUS at 17:36

## 2019-06-06 RX ADMIN — HEPARIN SODIUM 5000 UNIT(S): 5000 INJECTION INTRAVENOUS; SUBCUTANEOUS at 17:36

## 2019-06-06 RX ADMIN — HEPARIN SODIUM 5000 UNIT(S): 5000 INJECTION INTRAVENOUS; SUBCUTANEOUS at 06:20

## 2019-06-06 NOTE — PROGRESS NOTE ADULT - SUBJECTIVE AND OBJECTIVE BOX
No acute issues.    MEDICATIONS  (STANDING):  aspirin 325 milliGRAM(s) Oral daily  atorvastatin 20 milliGRAM(s) Oral at bedtime  famotidine    Tablet 20 milliGRAM(s) Oral two times a day  heparin  Injectable 5000 Unit(s) SubCutaneous every 8 hours  metoprolol tartrate 50 milliGRAM(s) Oral two times a day  multivitamin 1 Tablet(s) Oral daily  nystatin Powder 1 Application(s) Topical two times a day    MEDICATIONS  (PRN):  acetaminophen   Tablet .. 650 milliGRAM(s) Oral every 6 hours PRN Temp greater or equal to 38C (100.4F), Mild Pain (1 - 3)  loperamide 2 milliGRAM(s) Oral every 6 hours PRN Diarrhea  prochlorperazine   Injectable 5 milliGRAM(s) IV Push every 4 hours PRN nausea  simethicone 80 milliGRAM(s) Chew every 8 hours PRN Gas  zinc oxide 40% Ointment 1 Application(s) Topical every 4 hours PRN incontinent of stool and urine  zolpidem 5 milliGRAM(s) Oral at bedtime PRN Insomnia    Vital Signs Last 24 Hrs  T(C): 36.7 (06 Jun 2019 05:50), Max: 36.9 (05 Jun 2019 11:21)  T(F): 98.1 (06 Jun 2019 05:50), Max: 98.5 (05 Jun 2019 11:21)  HR: 78 (06 Jun 2019 05:50) (74 - 78)  BP: 128/65 (06 Jun 2019 05:50) (126/72 - 138/75)  BP(mean): --  RR: 18 (06 Jun 2019 05:50) (16 - 18)  SpO2: 100% (06 Jun 2019 05:50) (97% - 100%)  I&O's Detail         06-06    148<H>  |  109<H>  |  12  ----------------------------<  102<H>  4.0   |  32<H>  |  0.91    Ca    9.3      06 Jun 2019 07:23  Mg     1.9     06-06

## 2019-06-06 NOTE — PROGRESS NOTE ADULT - ASSESSMENT
A/P - Infiltrating rectal ca with ? bladder involvement    Await MRI for staging.  CEA level.  Heme/onc input re: candidate for nCRT if advanced stage given limited performance status.

## 2019-06-06 NOTE — CONSULT NOTE ADULT - PROBLEM SELECTOR RECOMMENDATION 9
with possible local bladder wall invasion [id number]  no obvious metastatic disease  await MRI to confirm bladder involvement  rec  treatment ususally neoadjuvant radiation with oral xeloda followed by surgery if tolerated.  delivery of the above treatment will be challenging  Follow up MRI result  radiation oncology evaluation  urology evaluation  Discussed with the patient and his son and Dr Rodriguez.

## 2019-06-06 NOTE — PROGRESS NOTE ADULT - SUBJECTIVE AND OBJECTIVE BOX
Patient is a 86y old  Male who presents with a chief complaint of diarrhea (05 Jun 2019 16:52)      HPI:  pt awake and alert  he is not able to speak but we are able to communicate via nodding and hand signals currently  he feels so-so  no significant pain    PAST MEDICAL & SURGICAL HISTORY:  HLD (hyperlipidemia)  HTN (hypertension)  Osteoarthrosis  Hyperlipidemia  Hypertension  CAD (coronary artery disease)  H/O total knee replacement, right  History of cholecystectomy  S/P CABG x 5    MEDICATIONS  (STANDING):  aspirin 325 milliGRAM(s) Oral daily  atorvastatin 20 milliGRAM(s) Oral at bedtime  famotidine    Tablet 20 milliGRAM(s) Oral two times a day  heparin  Injectable 5000 Unit(s) SubCutaneous every 8 hours  metoprolol tartrate 50 milliGRAM(s) Oral two times a day  multivitamin 1 Tablet(s) Oral daily  nystatin Powder 1 Application(s) Topical two times a day    MEDICATIONS  (PRN):  acetaminophen   Tablet .. 650 milliGRAM(s) Oral every 6 hours PRN Temp greater or equal to 38C (100.4F), Mild Pain (1 - 3)  loperamide 2 milliGRAM(s) Oral every 6 hours PRN Diarrhea  prochlorperazine   Injectable 5 milliGRAM(s) IV Push every 4 hours PRN nausea  simethicone 80 milliGRAM(s) Chew every 8 hours PRN Gas  zinc oxide 40% Ointment 1 Application(s) Topical every 4 hours PRN incontinent of stool and urine  zolpidem 5 milliGRAM(s) Oral at bedtime PRN Insomnia    Allergies  No Known Allergies    Vital Signs Last 24 Hrs  T(C): 36.7 (06 Jun 2019 05:50), Max: 36.9 (05 Jun 2019 11:21)  T(F): 98.1 (06 Jun 2019 05:50), Max: 98.5 (05 Jun 2019 11:21)  HR: 78 (06 Jun 2019 05:50) (74 - 78)  BP: 128/65 (06 Jun 2019 05:50) (126/72 - 138/75)  BP(mean): --  RR: 18 (06 Jun 2019 05:50) (16 - 18)  SpO2: 100% (06 Jun 2019 05:50) (97% - 100%)    PHYSICAL EXAM:    Constitutional: appears chronically ill  Respiratory: rhonchi  Cardiovascular: S1 and S2  Gastrointestinal: BS+, soft, mildly distended      LABS:                RADIOLOGY & ADDITIONAL STUDIES:

## 2019-06-06 NOTE — PROGRESS NOTE ADULT - ASSESSMENT
#Hypoxic respiratory failure likely from aspiration.   -Continue Zosyn, NC O2  -Speech and swallow consult- dysphagia 1 diet  -Downgrade from ICU on 6/1/19    #Signet ring cell adenocarcinoma  -Found on rectal biopsy (proximal and distal rectum) during colonoscopy  -Colorectal Surgery consulted, appreciate recs- awaiting MRI  -Heme/Onc consulted, Radiation Oncology consulted  -CEA- elevated- 284    #S/P Cardiac arrest  #AT with brief Afib  -Cardio consulted, appreciate recs  -Continue ASA 325mg  -Continue BB, statin  -Patient with recent hematuria, would hold off on A/C at this time, however CHADSVASC 4    #Diarrhea/nausea/abd distention: due to Pancolitis with extensive pelvic inflammation: Infectious vs inflammatory  -cdiff negative  -GI PCR negative  -repeat CT imaging still with extensive inflammation, no obstruction   -ESR: 34  -supportive care    # H/O hematuria  -CT shows b/l hydroureternephrosis with thickened bladder  -Pt will need outpatient cystoscopy with biopsy w/ Dr. Briseno to r/o malignancy- family requesting reconsult to see if biopsy can be done while inpatient- spoke with Dr. Briseno who stated he will perform biopsy as outpatient    #CAD s/p CABG  Continue aspirin, lipitor    Anemia: Stable  -monitor    #HTN  Stable  Continue lopressor and Vasotec    # DVT prophylaxis-on heparin #Hypoxic respiratory failure likely from aspiration.   -Continue Zosyn, NC O2  -Speech and swallow consult- dysphagia 1 diet  -Downgrade from ICU on 6/1/19    #Signet ring cell adenocarcinoma  -Found on rectal biopsy (proximal and distal rectum) during colonoscopy  -Colorectal Surgery consulted, appreciate recs- awaiting MRI  -Heme/Onc consulted, Radiation Oncology consulted  -CEA- elevated- 284    #S/P Cardiac arrest  #AT with brief Afib  -Cardio consulted, appreciate recs  -Continue ASA 325mg  -Continue BB, statin  -Patient with recent hematuria, would hold off on A/C at this time, however CHADSVASC 4    #[place]  -Patient recently intubated- extubated 5/29  -ENT consulted- Dr. Gann, awaiting recs    # H/O hematuria  -CT shows b/l hydroureternephrosis with thickened bladder  -Pt will need outpatient cystoscopy with biopsy w/ Dr. Briseno to r/o malignancy- family requesting reconsult to see if biopsy can be done while inpatient- spoke with Dr. Briseno who stated he will perform biopsy as outpatient    #CAD s/p CABG  Continue aspirin, lipitor    Anemia: Stable  -monitor    #HTN  Stable  Continue lopressor and Vasotec    # DVT prophylaxis-on heparin

## 2019-06-06 NOTE — PROGRESS NOTE ADULT - SUBJECTIVE AND OBJECTIVE BOX
INTERVAL HPI/OVERNIGHT EVENTS:  This patient is an 86 year old male with hx. of CAD, s/p CABG about 5 years ago who presented with weakness and persistent diarrhea. CT of abdomen and Pelvis showed ? early sbo, thickened colon and Pelvic inflammation. The patient was receiving a bowel prep which he was having some difficulty taking. He acutely developed respiratory distress, ? vomiting ? aspiration and than had cardiac arrest Code Blue was called.  On arrival cpr was started , patient was emergently intubated. Patient received 1 epinephrine, approximately 5 minutes of cpr. Then ROSC was obtained. Patient did awake and follow commands after arrest, OG tube was placed. ekg no acute st changes.  Focused echo, good lv function, not on pressors    5/28: Patient following commands, Worsening L Infiltrate on CXR, Weaned for a while this morning  5/29: CXR improved and weaned well and extubated this AM.  Still with significant NGT drainage overnight  5/30: Doing well extubated.  Decreasing NGT drainage.  Coughing up some blood.    5/31: on NC O2, Increased PVC on CXR.  For EGD and Sigmoidoscopy today  6/1: Tolerating PO, No other events  6/2/19- Patient seen and examined at bedside with son. Patient states he feels tired/weak, no acute overnight events.  6/3/19- Patient seen and examined at bedside with son. Patient states he feels alright, patient tired, having trouble speaking, but able to communicate with hand gestures and writing. Patient states he feels hungry today.  6/4/19- Patient seen and examined at bedside. Patient resting comfortably in bed, but looks weak. Patient still with trouble speaking. Family states patient eating some food. Patient denies any CP, SOB.  6/5/19- Patient seen and examined at bedside. Patient resting in bed comfortably. Complains of mild abdominal pain today. Patient tolerating diet. Spoke with patient and family at bedside regarding pathology results. Explained we would need to run more tests and involve Heme/Onc, Surgery in care going forward. Patient expresses understanding. States "I don't know" when questioned how he felt after hearing the news. Emotional support provided by family. All questions answered.  6/6/19- Patient seen and examined at bedside. States he feels alright. Still having trouble speaking. Patient denies any pain today. Awaiting MRI to evaluate carcinoma.    ROS: Negative unless mentioned in HPI above    MEDICATIONS  (STANDING):  aspirin 325 milliGRAM(s) Oral daily  atorvastatin 20 milliGRAM(s) Oral at bedtime  famotidine    Tablet 20 milliGRAM(s) Oral two times a day  heparin  Injectable 5000 Unit(s) SubCutaneous every 8 hours  metoprolol tartrate 50 milliGRAM(s) Oral two times a day  multivitamin 1 Tablet(s) Oral daily  nystatin Powder 1 Application(s) Topical two times a day    MEDICATIONS  (PRN):  acetaminophen   Tablet .. 650 milliGRAM(s) Oral every 6 hours PRN Temp greater or equal to 38C (100.4F), Mild Pain (1 - 3)  loperamide 2 milliGRAM(s) Oral every 6 hours PRN Diarrhea  prochlorperazine   Injectable 5 milliGRAM(s) IV Push every 4 hours PRN nausea  simethicone 80 milliGRAM(s) Chew every 8 hours PRN Gas  zinc oxide 40% Ointment 1 Application(s) Topical every 4 hours PRN incontinent of stool and urine  zolpidem 5 milliGRAM(s) Oral at bedtime PRN Insomnia      Allergies    No Known Allergies    Intolerances      Vital Signs Last 24 Hrs  T(C): 36.6 (06 Jun 2019 11:09), Max: 36.8 (05 Jun 2019 19:30)  T(F): 97.8 (06 Jun 2019 11:09), Max: 98.3 (05 Jun 2019 19:30)  HR: 84 (06 Jun 2019 11:09) (74 - 84)  BP: 126/66 (06 Jun 2019 11:09) (126/66 - 138/69)  BP(mean): --  RR: 18 (06 Jun 2019 11:09) (16 - 18)  SpO2: 96% (06 Jun 2019 11:09) (96% - 100%)      Physical Exam:  General: frail, weak  Neurology: A&Ox3, nonfocal, GREEN x 4  Respiratory: coarse breath sounds B/L  CV: RRR, S1S2  Abdominal: Soft, NT, ND +BS  Extremities: +edema B/L LE, + peripheral pulses      LABS:    06-06    148<H>  |  109<H>  |  12  ----------------------------<  102<H>  4.0   |  32<H>  |  0.91    Ca    9.3      06 Jun 2019 07:23  Mg     1.9     06-06            RADIOLOGY & ADDITIONAL TESTS:

## 2019-06-06 NOTE — PROGRESS NOTE ADULT - NSHPATTENDINGPLANDISCUSS_GEN_ALL_CORE
patient, son, GI, RN
family
patient, son, Heme/Onc, RN
patient, son, RN
patient, son, RN
patient, sons, daughter, Heme/Onc, RN

## 2019-06-06 NOTE — CONSULT NOTE ADULT - SUBJECTIVE AND OBJECTIVE BOX
Patient is a 86y old  Male who presents with a chief complaint of diarrhea (06 Jun 2019 16:44)      HPI:  85yo/M with PMH CAD s/p CABG in 2005, HTN, hyperlipidemia, OA s/p RT TKR, presented for evaluation of diarrhea. Patient states he had symptoms for almost a month, associated with urinary incontinence at time and dysuria. Patient was seen by DR Key as outpatient and supposed to have cystoscopy   His hospital course complicated by respiratory and cardiac arrest. now extubated and stable    colonoscopy revealed a rectal mass, biopsy signet ring adenocarcinoma    CT scan shows area of bladder wall thickening suspicious for bladder invasion. no obvious metastatic disease    the patient feeling better but having difficulty talking. he has not been able to ambulate yet    Await pelvic MRI    the patient was well and independent prior to his current illness.      PAST MEDICAL & SURGICAL HISTORY:  HLD (hyperlipidemia)  HTN (hypertension)  Osteoarthrosis  Hyperlipidemia  Hypertension  CAD (coronary artery disease)  H/O total knee replacement, right  History of cholecystectomy  S/P CABG x 5      REVIEW OF SYSTEMS    General:	  weak  Hoarse voice  dysuria        FAMILY HISTORY:  Family history of premature CAD      Home Medications:  aspirin 325 mg oral tablet: 1 tab(s) orally once a day (21 May 2019 19:48)  atorvastatin 20 mg oral tablet: 1 tab(s) orally once a day (21 May 2019 19:48)  enalapril 20 mg oral tablet: 1 tab(s) orally once a day (21 May 2019 19:48)  meloxicam 7.5 mg oral tablet: 1 tab(s) orally once a day (21 May 2019 19:48)  Metoprolol Succinate  mg oral tablet, extended release: 1 tab(s) orally once a day (21 May 2019 19:48)      MEDICATIONS  (STANDING):  aspirin 325 milliGRAM(s) Oral daily  atorvastatin 20 milliGRAM(s) Oral at bedtime  famotidine    Tablet 20 milliGRAM(s) Oral two times a day  heparin  Injectable 5000 Unit(s) SubCutaneous every 8 hours  metoprolol tartrate 50 milliGRAM(s) Oral two times a day  multivitamin 1 Tablet(s) Oral daily  nystatin Powder 1 Application(s) Topical two times a day    MEDICATIONS  (PRN):  acetaminophen   Tablet .. 650 milliGRAM(s) Oral every 6 hours PRN Temp greater or equal to 38C (100.4F), Mild Pain (1 - 3)  loperamide 2 milliGRAM(s) Oral every 6 hours PRN Diarrhea  prochlorperazine   Injectable 5 milliGRAM(s) IV Push every 4 hours PRN nausea  simethicone 80 milliGRAM(s) Chew every 8 hours PRN Gas  zinc oxide 40% Ointment 1 Application(s) Topical every 4 hours PRN incontinent of stool and urine  zolpidem 5 milliGRAM(s) Oral at bedtime PRN Insomnia      PHYSICAL EXAM:  Vital Signs Last 24 Hrs  T(C): 36.7 (06 Jun 2019 19:51), Max: 36.7 (06 Jun 2019 05:50)  T(F): 98.1 (06 Jun 2019 19:51), Max: 98.1 (06 Jun 2019 05:50)  HR: 78 (06 Jun 2019 19:51) (78 - 106)  BP: 117/56 (06 Jun 2019 19:51) (113/61 - 128/65)  BP(mean): --  RR: 18 (06 Jun 2019 19:51) (18 - 18)  SpO2: 98% (06 Jun 2019 19:51) (96% - 100%)      Gen:  Ill appearing elderly male   hoarse  alert and oriented x2  chest clear  abdomen soft  ECOG 3      LABS:    06 Jun 2019 07:23    148    |  109    |  12     ----------------------------<  102    4.0     |  32     |  0.91     Ca    9.3        06 Jun 2019 07:23  Mg     1.9       06 Jun 2019 07:23          PATH:  adneocarcinoma wtih signet ring features        RADIOLOGY & ADDITIONAL STUDIES:    IMPRESSION:     Mild wall thickening of the colon raising concern for colitis. This does   not appear significantly changed. Suggestion of wall thickening with mild   nodularity in the gastric antrum. Underlying neoplasm cannot be excluded.    Progression of mild ascites and interval development of small bilateral   pleural effusions. Presacral and perirectal soft tissue stranding and   soft tissue stranding in the left lateral pelvis are unchanged when   compared with the prior study.    Asymmetric wall thickening of the right side of the bladder extending   posteriorly and anteriorly. Underlying neoplastic process, be excluded.   Bilateral mild to moderate hydronephrosis and mild hydroureter which may   be related to the bladder process. Please correlate clinically.    Additional findings as above.

## 2019-06-06 NOTE — PROGRESS NOTE ADULT - ASSESSMENT
87yo male with multiple medical issues  he has had a confusing hospital course  initially thought to be pancolitis  he then had asp pneumonia and code due to resp failure  sigmoidoscopy showed tight anal stricture and abnormal rectal mucosa but no discrete mass  distal rectal mucosa was thickeend and firm but unclear at the time if from scarring or mass - however biopsies did show cancer  await mri, cea for staging  oncology to see  I will d/w patient's daughter

## 2019-06-07 LAB
ANION GAP SERPL CALC-SCNC: 7 MMOL/L — SIGNIFICANT CHANGE UP (ref 5–17)
BUN SERPL-MCNC: 17 MG/DL — SIGNIFICANT CHANGE UP (ref 7–23)
CALCIUM SERPL-MCNC: 9.2 MG/DL — SIGNIFICANT CHANGE UP (ref 8.5–10.1)
CHLORIDE SERPL-SCNC: 109 MMOL/L — HIGH (ref 96–108)
CO2 SERPL-SCNC: 34 MMOL/L — HIGH (ref 22–31)
CREAT SERPL-MCNC: 1.18 MG/DL — SIGNIFICANT CHANGE UP (ref 0.5–1.3)
GLUCOSE SERPL-MCNC: 115 MG/DL — HIGH (ref 70–99)
HCT VFR BLD CALC: 37.3 % — LOW (ref 39–50)
HGB BLD-MCNC: 11.7 G/DL — LOW (ref 13–17)
MCHC RBC-ENTMCNC: 29 PG — SIGNIFICANT CHANGE UP (ref 27–34)
MCHC RBC-ENTMCNC: 31.4 GM/DL — LOW (ref 32–36)
MCV RBC AUTO: 92.6 FL — SIGNIFICANT CHANGE UP (ref 80–100)
PLATELET # BLD AUTO: 302 K/UL — SIGNIFICANT CHANGE UP (ref 150–400)
POTASSIUM SERPL-MCNC: 3.6 MMOL/L — SIGNIFICANT CHANGE UP (ref 3.5–5.3)
POTASSIUM SERPL-SCNC: 3.6 MMOL/L — SIGNIFICANT CHANGE UP (ref 3.5–5.3)
RBC # BLD: 4.03 M/UL — LOW (ref 4.2–5.8)
RBC # FLD: 14.3 % — SIGNIFICANT CHANGE UP (ref 10.3–14.5)
SODIUM SERPL-SCNC: 150 MMOL/L — HIGH (ref 135–145)
WBC # BLD: 9.21 K/UL — SIGNIFICANT CHANGE UP (ref 3.8–10.5)
WBC # FLD AUTO: 9.21 K/UL — SIGNIFICANT CHANGE UP (ref 3.8–10.5)

## 2019-06-07 PROCEDURE — 99232 SBSQ HOSP IP/OBS MODERATE 35: CPT

## 2019-06-07 PROCEDURE — 99233 SBSQ HOSP IP/OBS HIGH 50: CPT

## 2019-06-07 RX ORDER — SODIUM CHLORIDE 9 MG/ML
1000 INJECTION, SOLUTION INTRAVENOUS
Refills: 0 | Status: DISCONTINUED | OUTPATIENT
Start: 2019-06-07 | End: 2019-06-11

## 2019-06-07 RX ADMIN — Medication 200 MILLIGRAM(S): at 18:46

## 2019-06-07 RX ADMIN — Medication 50 MILLIGRAM(S): at 06:53

## 2019-06-07 RX ADMIN — HEPARIN SODIUM 5000 UNIT(S): 5000 INJECTION INTRAVENOUS; SUBCUTANEOUS at 21:00

## 2019-06-07 RX ADMIN — SODIUM CHLORIDE 70 MILLILITER(S): 9 INJECTION, SOLUTION INTRAVENOUS at 20:59

## 2019-06-07 RX ADMIN — Medication 50 MILLIGRAM(S): at 18:41

## 2019-06-07 RX ADMIN — NYSTATIN CREAM 1 APPLICATION(S): 100000 CREAM TOPICAL at 18:33

## 2019-06-07 RX ADMIN — ZOLPIDEM TARTRATE 5 MILLIGRAM(S): 10 TABLET ORAL at 22:31

## 2019-06-07 RX ADMIN — Medication 325 MILLIGRAM(S): at 12:43

## 2019-06-07 RX ADMIN — HEPARIN SODIUM 5000 UNIT(S): 5000 INJECTION INTRAVENOUS; SUBCUTANEOUS at 06:54

## 2019-06-07 RX ADMIN — FAMOTIDINE 20 MILLIGRAM(S): 10 INJECTION INTRAVENOUS at 18:41

## 2019-06-07 RX ADMIN — HEPARIN SODIUM 5000 UNIT(S): 5000 INJECTION INTRAVENOUS; SUBCUTANEOUS at 12:43

## 2019-06-07 RX ADMIN — ATORVASTATIN CALCIUM 20 MILLIGRAM(S): 80 TABLET, FILM COATED ORAL at 21:00

## 2019-06-07 RX ADMIN — Medication 1 TABLET(S): at 12:43

## 2019-06-07 RX ADMIN — FAMOTIDINE 20 MILLIGRAM(S): 10 INJECTION INTRAVENOUS at 06:53

## 2019-06-07 RX ADMIN — NYSTATIN CREAM 1 APPLICATION(S): 100000 CREAM TOPICAL at 06:54

## 2019-06-07 NOTE — PROGRESS NOTE ADULT - ASSESSMENT
#Hypoxic respiratory failure likely from aspiration.   -Continue Zosyn, NC O2  -Speech and swallow consult- dysphagia 1 diet  -Downgrade from ICU on 6/1/19    #Signet ring cell adenocarcinoma  -Found on rectal biopsy (proximal and distal rectum) during colonoscopy  -Colorectal Surgery consulted, appreciate recs- awaiting MRI  -Heme/Onc consulted, Radiation Oncology consulted  -CEA- elevated- 284    #S/P Cardiac arrest  #AT with brief Afib  -Cardio consulted, appreciate recs  -Continue ASA 325mg  -Continue BB, statin  -Patient with recent hematuria, would hold off on A/C at this time, however CHADSVASC 4    #Aphonia  -Patient recently intubated- extubated 5/29  -ENT consulted- Dr. Gann, awaiting recs    # H/O hematuria  -CT shows b/l hydroureternephrosis with thickened bladder  -Pt will need outpatient cystoscopy with biopsy w/ Dr. Briseno to r/o malignancy- family requesting reconsult to see if biopsy can be done while inpatient- spoke with Dr. Briseno who stated he will perform biopsy as outpatient    #CAD s/p CABG  Continue aspirin, lipitor    Anemia: Stable  -monitor    #HTN  Stable  Continue lopressor and Vasotec    # DVT prophylaxis-on heparin    #disposition- pending MRI report rehab vs hospice, now IVf for hypernatremia #Hypoxic respiratory failure likely from aspiration.   -Continue Zosyn, NC O2  -Speech and swallow consult- dysphagia 1 diet  -Downgrade from ICU on 6/1/19    #Signet ring cell adenocarcinoma  -Found on rectal biopsy (proximal and distal rectum) during colonoscopy  -Colorectal Surgery consulted, appreciate recs- awaiting MRI  -Heme/Onc consulted, Radiation Oncology consulted  -CEA- elevated- 284    #S/P Cardiac arrest  #AT with brief Afib  -Cardio consulted, appreciate recs  -Continue ASA 325mg  -Continue BB, statin  -Patient with recent hematuria, would hold off on A/C at this time, however CHADSVASC 4    #Aphonia  -Patient recently intubated- extubated 5/29  -ENT consulted- Dr. Gann, awaiting recs    # H/O hematuria  -CT shows b/l hydroureternephrosis with thickened bladder  -Pt will need outpatient cystoscopy with biopsy w/ Dr. Briseno to r/o malignancy- family requesting reconsult to see if biopsy can be done while inpatient- spoke with Dr. Briseno who stated he will perform biopsy as outpatient    #CAD s/p CABG  Continue aspirin, lipitor    Anemia: Stable  -monitor    #HTN  Stable  Continue lopressor and Vasotec    # DVT prophylaxis-on heparin    #disposition- pending MRI report rehab vs hospice, now IVf for hypernatremia    plan for rehab and then outpt RT and chemo if able to tolerate it  f/u urology for primary tumor in bladder

## 2019-06-07 NOTE — CONSULT NOTE ADULT - ASSESSMENT
85 yo M with newly dx rectal adenoca possibly invading posterior bladder.  Pt with poor KPS and at this time not a candidate for definitive therapy.  If his KPS improves I discussed various tx options for rectal cancer including palliative therapy with 5 fractions of xrt as outpt. Pt's son Cecil would like to get pt to [facility] and I recommend outpt follow up if his KPS improves enough for therapy.  Pt and son in agreement with plan. Unlikely for pt to be able to tolerate curative therapy at this point but will complete staging evaluation to make definitive recommendation. Case dw Dr. Johnson.

## 2019-06-07 NOTE — PROGRESS NOTE ADULT - SUBJECTIVE AND OBJECTIVE BOX
CHIEF COMPLAINT: Patient is a 86y old  Male who presents with a chief complaint of diarrhea (27 May 2019 20:09)      HPI:  85yo/M with PMH CAD s/p CABG in 2005, HTN, hyperlipidemia, OA s/p RT TKR, presented for evaluation of diarrhea. Patient states he had symptoms for almost a month, had been seen by GI as outpatient and had cdiff done about a week ago that was negative. He continues to have diarrhea now associated with urinary incontinence at time and dysuria. Patient was seen by DR Key as outpatient and supposed to have cystoscopy with biopsy last week but cancelled 2 to not feeling well with diarrhea and feeling weak. He denies chest pain or SOB. No fever or chills. Feels abdomen is distended and uncomfortable (21 May 2019 16:24)   aspirated / vomited -resp arrest now intubated in ICU-- subsequent extubation    6/4 - breif AF o monitor   6/5 fatigued appearing, brief AF on monitor  6/7 no acute issues overnight     PMHx: PAST MEDICAL & SURGICAL HISTORY:  HLD (hyperlipidemia)  HTN (hypertension)  Osteoarthrosis  Hyperlipidemia  Hypertension  CAD (coronary artery disease)  H/O total knee replacement, right  History of cholecystectomy  S/P CABG x 5        Soc Hx:  no toxic habits      Allergies: Allergies    No Known Allergies    Intolerances    MEDICATIONS  (STANDING):  aspirin 325 milliGRAM(s) Oral daily  atorvastatin 20 milliGRAM(s) Oral at bedtime  famotidine    Tablet 20 milliGRAM(s) Oral two times a day  heparin  Injectable 5000 Unit(s) SubCutaneous every 8 hours  metoprolol tartrate 50 milliGRAM(s) Oral two times a day  multivitamin 1 Tablet(s) Oral daily  nystatin Powder 1 Application(s) Topical two times a day        REVIEW OF SYSTEMS:    All other review of systems is negative unless indicated above          PHYSICAL EXAM:   Constitutional: NAD, awake and alert, well-developed  HEENT: PERR, EOMI, Normal Hearing, MMM  Neck: Soft and supple, No LAD, No JVD  Respiratory: Breath sounds are clear bilaterally, No wheezing, rales or rhonchi  Cardiovascular: S1 and S2, regular rate and rhythm, no Murmurs, gallops or rubs  Gastrointestinal: Bowel Sounds present, soft, nontender, nondistended, no guarding, no rebound  Extremities: 2+peripheral edema  Vascular: 2+ peripheral pulses  Neurological: alert no focal deficits  Musculoskeletal: 5/5 strength b/l upper and lower extremities  Skin: No rashes      ICU Vital Signs Last 24 Hrs  T(C): 36.7 (06 Jun 2019 19:51), Max: 36.7 (06 Jun 2019 19:51)  T(F): 98.1 (06 Jun 2019 19:51), Max: 98.1 (06 Jun 2019 19:51)  HR: 78 (06 Jun 2019 19:51) (78 - 106)  BP: 117/56 (06 Jun 2019 19:51) (113/61 - 126/66)  BP(mean): --  ABP: --  ABP(mean): --  RR: 18 (06 Jun 2019 19:51) (18 - 18)  SpO2: 98% (06 Jun 2019 19:51) (96% - 98%)        LABS: All Labs Reviewed:       06-06    148<H>  |  109<H>  |  12  ----------------------------<  102<H>  4.0   |  32<H>  |  0.91    Ca    9.3      06 Jun 2019 07:23  Mg     1.9     06-06          Blood Culture: Organism --  Gram Stain Blood -- Gram Stain --  Specimen Source .Stool Feces palomo damian  Culture-Blood --        EKG: ST, RBBB, LPFB, possible inferior infarct, no signficiat ST changes     Telemetry: SR , breif AF    ECHO:- < from: Transthoracic Echocardiogram (05.28.19 @ 08:46) >   Summary     Endocardium is not always well visualized, however, overall left   ventricular systolic function appears grossly normal. Technically   Difficult Study.   Estimated left ventricular ejection fraction is 55-60 %.   Fibrocalcific changes noted to the Aortic valve leaflets with preserved   leaflet excursion.   No aortic regurgitation is present.   There is thickening and calcification of anterior mitral valve leaflet.   The leaflet opening is normal.   Mild mitral annular calcification is present.   The mitral valve leaflets appear thickened.   EA reversal of the mitral inflow consistent with reduced compliance of   the   left ventricle.   Normal appearing tricuspid valve structure and function.   Trace tricuspid valve regurgitation is present.   Pleural effusion cannot be ruled out.     Signature     ----------------------------------------------------------------   Electronically signed by Dale Morrissey MD([facility]   physician) on 05/28/2019 08:05 PM   ----------------------------------------------------------------    < end of copied text >

## 2019-06-07 NOTE — PROGRESS NOTE ADULT - ASSESSMENT
86 M with CAD, HTN CHol  with resp arrest req intubation after vomiting and likely aspiration- now extubated and improving       CE are minimally elevated - check serial enzymes  echo reveal perserved EF, no significnat valve disease    LE edema- recommend  PRN lasix-  - keep k+ > 4-- some of edema is likely due to protein malnutrition      AT- now with brief AF - will continue lopressor for PACs and tachycardia - despite  chads vasc score of 4( age, HTN, CAD)- 4 % yearly risk of CVA- AC deferred due to risk of bleeding and hematuria- will revisit risk / benefits of AC as an out patient    CAD- continue with asa and statin -no urgent need for angiogram at this time- consider ischemic evaluation  as an outpatient \    please call as needed

## 2019-06-07 NOTE — PROGRESS NOTE ADULT - ASSESSMENT
85 yo male with rectal cancer. Having staging evaluation. To consider RT and Xeloda. Continue to encourage po intake.

## 2019-06-07 NOTE — PROGRESS NOTE ADULT - SUBJECTIVE AND OBJECTIVE BOX
Patient is a 86y old  Male who presents with a chief complaint of diarrhea (07 Jun 2019 09:24)      HPI:  85yo/M with PMH CAD s/p CABG in 2005, HTN, hyperlipidemia, OA s/p RT TKR, presented for evaluation of diarrhea. Patient states he had symptoms for almost a month, had been seen by GI as outpatient and had cdiff done about a week ago that was negative. He continues to have diarrhea now associated with urinary incontinence at time and dysuria. Patient was seen by DR Key as outpatient and supposed to have cystoscopy with biopsy last week but cancelled 2 to not feeling well with diarrhea and feeling weak. He denies chest pain or SOB. No fever or chills. Feels abdomen is distended and uncomfortable (21 May 2019 16:24)    Awaiting pelvic MRI results. Patient with poor po intake. No complaints of abdominal pain.       PAST MEDICAL & SURGICAL HISTORY:  HLD (hyperlipidemia)  HTN (hypertension)  Osteoarthrosis  Hyperlipidemia  Hypertension  CAD (coronary artery disease)  H/O total knee replacement, right  History of cholecystectomy  S/P CABG x 5      MEDICATIONS  (STANDING):  aspirin 325 milliGRAM(s) Oral daily  atorvastatin 20 milliGRAM(s) Oral at bedtime  famotidine    Tablet 20 milliGRAM(s) Oral two times a day  heparin  Injectable 5000 Unit(s) SubCutaneous every 8 hours  metoprolol tartrate 50 milliGRAM(s) Oral two times a day  multivitamin 1 Tablet(s) Oral daily  nystatin Powder 1 Application(s) Topical two times a day    MEDICATIONS  (PRN):  acetaminophen   Tablet .. 650 milliGRAM(s) Oral every 6 hours PRN Temp greater or equal to 38C (100.4F), Mild Pain (1 - 3)  loperamide 2 milliGRAM(s) Oral every 6 hours PRN Diarrhea  prochlorperazine   Injectable 5 milliGRAM(s) IV Push every 4 hours PRN nausea  simethicone 80 milliGRAM(s) Chew every 8 hours PRN Gas  zinc oxide 40% Ointment 1 Application(s) Topical every 4 hours PRN incontinent of stool and urine  zolpidem 5 milliGRAM(s) Oral at bedtime PRN Insomnia      Allergies    No Known Allergies    Intolerances        Vital Signs Last 24 Hrs  T(C): 36.6 (07 Jun 2019 06:51), Max: 36.7 (06 Jun 2019 19:51)  T(F): 97.9 (07 Jun 2019 06:51), Max: 98.1 (06 Jun 2019 19:51)  HR: 110 (07 Jun 2019 06:51) (78 - 110)  BP: 130/80 (07 Jun 2019 06:51) (113/61 - 130/80)  BP(mean): --  RR: 18 (07 Jun 2019 06:51) (18 - 18)  SpO2: 100% (07 Jun 2019 06:51) (96% - 100%)    PHYSICAL EXAM:    chronically ill male in NAD  Respiratory: CTAB  Cardiovascular: S1 and S2, RRR, no M/G/R  Gastrointestinal: BS+, soft, NT/ND    LABS:                        11.7   9.21  )-----------( 302      ( 07 Jun 2019 07:36 )             37.3     06-07    150<H>  |  109<H>  |  17  ----------------------------<  115<H>  3.6   |  34<H>  |  1.18    Ca    9.2      07 Jun 2019 07:36  Mg     1.9     06-06            RADIOLOGY & ADDITIONAL STUDIES:

## 2019-06-07 NOTE — CONSULT NOTE ADULT - SUBJECTIVE AND OBJECTIVE BOX
Patient is a 86y old  Male who presents with a chief complaint of diarrhea (07 Jun 2019 09:24)    HPI:  85yo/M with PMH CAD s/p CABG in 2005, HTN, hyperlipidemia, OA s/p RT TKR, presented for evaluation of diarrhea. Patient states he had symptoms for almost a month, had been seen by GI as outpatient and had cdiff done about a week ago that was negative. He continues to have diarrhea now associated with urinary incontinence at time and dysuria. Patient was seen by DR Key as outpatient and supposed to have cystoscopy with biopsy last week but cancelled 2 to not feeling well with diarrhea and feeling weak. He denies chest pain or SOB. No fever or chills. Feels abdomen is distended and uncomfortable (21 May 2019 16:24).  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  MRI pelvis performed yesterday and results pending.  Pt lived independently prior to admission but now with poor performance status.       PAST MEDICAL & SURGICAL HISTORY:  HLD (hyperlipidemia)  HTN (hypertension)  Osteoarthrosis  Hyperlipidemia  Hypertension  CAD (coronary artery disease)  H/O total knee replacement, right  History of cholecystectomy  S/P CABG x 5      SOCIAL HISTORY: Lived independently in [place]. Son Cecil lives in [place]    FAMILY HISTORY:  Family history of premature CAD      MEDICATIONS  (STANDING):  aspirin 325 milliGRAM(s) Oral daily  atorvastatin 20 milliGRAM(s) Oral at bedtime  famotidine    Tablet 20 milliGRAM(s) Oral two times a day  heparin  Injectable 5000 Unit(s) SubCutaneous every 8 hours  metoprolol tartrate 50 milliGRAM(s) Oral two times a day  multivitamin 1 Tablet(s) Oral daily  nystatin Powder 1 Application(s) Topical two times a day    MEDICATIONS  (PRN):  acetaminophen   Tablet .. 650 milliGRAM(s) Oral every 6 hours PRN Temp greater or equal to 38C (100.4F), Mild Pain (1 - 3)  loperamide 2 milliGRAM(s) Oral every 6 hours PRN Diarrhea  prochlorperazine   Injectable 5 milliGRAM(s) IV Push every 4 hours PRN nausea  simethicone 80 milliGRAM(s) Chew every 8 hours PRN Gas  zinc oxide 40% Ointment 1 Application(s) Topical every 4 hours PRN incontinent of stool and urine  zolpidem 5 milliGRAM(s) Oral at bedtime PRN Insomnia      PHYSICAL EXAM:  Vital Signs Last 24 Hrs  T(C): 36.6 (07 Jun 2019 06:51), Max: 36.7 (06 Jun 2019 19:51)  T(F): 97.9 (07 Jun 2019 06:51), Max: 98.1 (06 Jun 2019 19:51)  HR: 110 (07 Jun 2019 06:51) (78 - 110)  BP: 130/80 (07 Jun 2019 06:51) (113/61 - 130/80)  BP(mean): --  RR: 18 (07 Jun 2019 06:51) (18 - 18)  SpO2: 100% (07 Jun 2019 06:51) (96% - 100%)  KPS 50  Genera: lethargic barely able to speak  Neck: no palpable lymphadenopathy  Lungs: Clear to ascultation bilaterally and no wheezes  Cardiac: normal S1, S2, no murmurs  Abdomen: soft, nontender with no ascites  Extremities: bilateral LE edema, right > left      LABS:  CBC Full  -  ( 07 Jun 2019 07:36 )  WBC Count : 9.21 K/uL  RBC Count : 4.03 M/uL  Hemoglobin : 11.7 g/dL  Hematocrit : 37.3 %  Platelet Count - Automated : 302 K/uL  Mean Cell Volume : 92.6 fl  Mean Cell Hemoglobin : 29.0 pg  Mean Cell Hemoglobin Concentration : 31.4 gm/dL  Auto Neutrophil # : x  Auto Lymphocyte # : x  Auto Monocyte # : x  Auto Eosinophil # : x  Auto Basophil # : x  Auto Neutrophil % : x  Auto Lymphocyte % : x  Auto Monocyte % : x  Auto Eosinophil % : x  Auto Basophil % : x    06-07    150<H>  |  109<H>  |  17  ----------------------------<  115<H>  3.6   |  34<H>  |  1.18    Ca    9.2      07 Jun 2019 07:36  Mg     1.9     06-06        RADIOLOGY:  < from: CT Abdomen and Pelvis w/ Oral Cont and w/ IV Cont (05.24.19 @ 12:53) >  EXAM:  CT ABDOMEN AND PELVIS OC IC                            PROCEDURE DATE:  05/24/2019          INTERPRETATION:  CLINICAL INFORMATION: Colitis with worsening abdominal   distention    COMPARISON: CT scan of the abdomen and pelvis from May 21, 2019    PROCEDURE:   CT of the Abdomen and Pelvis was performed with intravenous contrast.   Intravenous contrast: 90 ml Omnipaque 350. 10 ml discarded.  Oral contrast: None.  Sagittal and coronal reformats were performed.    FINDINGS:    LOWER CHEST: There is interval development of small bilateral pleural   effusions with adjacent atelectasis. Patient is status post sternotomy   and CABG.    LIVER: Within normal limits.  BILE DUCTS: Normal caliber.  GALLBLADDER: Cholecystectomy clips are present increased artifact in the   surrounding region.  SPLEEN: Within normal limits.  PANCREAS: There are a few calcifications within the tail of the pancreas.   Chronic pancreatitis cannot be excluded. Please correlate clinically.  ADRENALS: Within normal limits.  KIDNEYS/URETERS: Right renal cyst measuring up to 2.1 cm in the upper   pole laterally. Bilateral mild-to-moderate hydronephrosis and mild   hydroureter to the level of the pelvis. This is unchanged.    BLADDER: Again noted is marked asymmetric wall thickening of the right   side of the bladder extending anteriorly and posteriorly. Underlying   neoplastic process cannot be excluded.  REPRODUCTIVE ORGANS: Small calcifications in the prostate gland.   Infiltrative soft tissue changes in the presacral and perirectal region   are unchanged.    BOWEL: There is no bowel obstruction. Again noted is wall thickening   throughout the colon which is mild in appearance. There are regions of   underdistended sigmoid colon which limit evaluation. Appendix not well   visualized. No focal inflammatory changes are seen. There is mild   nodularity and wall thickening in the gastric antrum. Underlying neoplasm   cannot be excluded.  PERITONEUM: Mild ascites has increased. There is mild soft tissue   stranding in the left lateral pelvis which is unchanged. Bilateral   inguinal hernias containing fat are present.  VESSELS:  There are vascular calcifications. There is mild dilatation of   the distal abdominal aorta measuring up to approximately 2.5 cm when   compared with 2.0 cm more superiorly.  RETROPERITONEUM: No lymphadenopathy.    ABDOMINAL WALL: There is mild subcutaneous edema.  BONES: Degenerative changes of bone are present.    IMPRESSION:     Mild wall thickening of the colon raising concern for colitis. This does   not appear significantly changed. Suggestion of wall thickening with mild   nodularity in the gastric antrum. Underlying neoplasm cannot be excluded.    Progression of mild ascites and interval development of small bilateral   pleural effusions. Presacral and perirectal soft tissue stranding and   soft tissue stranding in the left lateral pelvis are unchanged when   compared with the prior study.    Asymmetric wall thickening of the right side of the bladder extending   posteriorly and anteriorly. Underlying neoplastic process, be excluded.   Bilateral mild to moderate hydronephrosis and mild hydroureter which may   be related to the bladder process. Please correlate clinically.    Additional findings as above.        YANNA MATTHEW   This document has been electronically signed. May 24 2019  2:20PM    < end of copied text >

## 2019-06-07 NOTE — PROGRESS NOTE ADULT - SUBJECTIVE AND OBJECTIVE BOX
This patient is an 86 year old male with hx. of CAD, s/p CABG about 5 years ago who presented with weakness and persistent diarrhea. CT of abdomen and Pelvis showed ? early sbo, thickened colon and Pelvic inflammation. The patient was receiving a bowel prep which he was having some difficulty taking. He acutely developed respiratory distress, ? vomiting ? aspiration and than had cardiac arrest Code Blue was called.  On arrival cpr was started , patient was emergently intubated. Patient received 1 epinephrine, approximately 5 minutes of cpr. Then ROSC was obtained. Patient did awake and follow commands after arrest, OG tube was placed. ekg no acute st changes.  Focused echo, good lv function, not on pressors    5/28: Patient following commands, Worsening L Infiltrate on CXR, Weaned for a while this morning  5/29: CXR improved and weaned well and extubated this AM.  Still with significant NGT drainage overnight  5/30: Doing well extubated.  Decreasing NGT drainage.  Coughing up some blood.    5/31: on NC O2, Increased PVC on CXR.  For EGD and Sigmoidoscopy today  6/1: Tolerating PO, No other events  6/2/19- Patient seen and examined at bedside with son. Patient states he feels tired/weak, no acute overnight events.  6/3/19- Patient seen and examined at bedside with son. Patient states he feels alright, patient tired, having trouble speaking, but able to communicate with hand gestures and writing. Patient states he feels hungry today.  6/4/19- Patient seen and examined at bedside. Patient resting comfortably in bed, but looks weak. Patient still with trouble speaking. Family states patient eating some food. Patient denies any CP, SOB.  6/5/19- Patient seen and examined at bedside. Patient resting in bed comfortably. Complains of mild abdominal pain today. Patient tolerating diet. Spoke with patient and family at bedside regarding pathology results. Explained we would need to run more tests and involve Heme/Onc, Surgery in care going forward. Patient expresses understanding. States "I don't know" when questioned how he felt after hearing the news. Emotional support provided by family. All questions answered.  6/7/19- Patient seen and examined at bedside. States he feels alright. Still having trouble speaking. Patient denies any pain today. Awaiting MRI to evaluate carcinoma.    Physical Exam:  General: frail, weak  Neurology: A&Ox3, nonfocal, GREEN x 4  Respiratory: coarse breath sounds B/L  CV: RRR, S1S2  Abdominal: Soft, NT, ND +BS  Extremities: +edema B/L LE, + peripheral pulses      PHYSICAL EXAM:    Daily     Daily     ICU Vital Signs Last 24 Hrs  T(C): 36.7 (07 Jun 2019 11:05), Max: 36.7 (06 Jun 2019 19:51)  T(F): 98.1 (07 Jun 2019 11:05), Max: 98.1 (06 Jun 2019 19:51)  HR: 76 (07 Jun 2019 11:05) (76 - 110)  BP: 118/61 (07 Jun 2019 11:05) (113/61 - 130/80)  BP(mean): --  ABP: --  ABP(mean): --  RR: 18 (07 Jun 2019 11:05) (18 - 18)  SpO2: 98% (07 Jun 2019 11:05) (98% - 100%)                                11.7   9.21  )-----------( 302      ( 07 Jun 2019 07:36 )             37.3       CBC Full  -  ( 07 Jun 2019 07:36 )  WBC Count : 9.21 K/uL  RBC Count : 4.03 M/uL  Hemoglobin : 11.7 g/dL  Hematocrit : 37.3 %  Platelet Count - Automated : 302 K/uL  Mean Cell Volume : 92.6 fl  Mean Cell Hemoglobin : 29.0 pg  Mean Cell Hemoglobin Concentration : 31.4 gm/dL  Auto Neutrophil # : x  Auto Lymphocyte # : x  Auto Monocyte # : x  Auto Eosinophil # : x  Auto Basophil # : x  Auto Neutrophil % : x  Auto Lymphocyte % : x  Auto Monocyte % : x  Auto Eosinophil % : x  Auto Basophil % : x      06-07    150<H>  |  109<H>  |  17  ----------------------------<  115<H>  3.6   |  34<H>  |  1.18    Ca    9.2      07 Jun 2019 07:36  Mg     1.9     06-06                              MEDICATIONS  (STANDING):  aspirin 325 milliGRAM(s) Oral daily  atorvastatin 20 milliGRAM(s) Oral at bedtime  famotidine    Tablet 20 milliGRAM(s) Oral two times a day  heparin  Injectable 5000 Unit(s) SubCutaneous every 8 hours  metoprolol tartrate 50 milliGRAM(s) Oral two times a day  multivitamin 1 Tablet(s) Oral daily  nystatin Powder 1 Application(s) Topical two times a day

## 2019-06-07 NOTE — PROGRESS NOTE ADULT - SUBJECTIVE AND OBJECTIVE BOX
no acute issues  MRI performed yest    Vital Signs Last 24 Hrs  T(C): 36.6 (07 Jun 2019 06:51), Max: 36.7 (06 Jun 2019 19:51)  T(F): 97.9 (07 Jun 2019 06:51), Max: 98.1 (06 Jun 2019 19:51)  HR: 110 (07 Jun 2019 06:51) (78 - 110)  BP: 130/80 (07 Jun 2019 06:51) (113/61 - 130/80)  BP(mean): --  RR: 18 (07 Jun 2019 06:51) (18 - 18)  SpO2: 100% (07 Jun 2019 06:51) (96% - 100%)    [facility]    Surgical Pathology Final Report - :   ACCESSION No:  [id number]    JAY CHAUHAN                        4        Addendum Report          Addendum Reason  To add a comment to the Comment Section.  No change to Final  Diagnosis.    Addendum Diagnosis  Comment:  Case discussed with Dr. Morse on 6/5/19.    SMITHA MANNING M.D.  (Electronic Signature)      Surgical Final Report          Final Diagnosis  1.  Duodenal bulb, biopsy:  - Benign duodenal mucosa, and focal gastric-type mucosa with  hemorrhage and congestion in lamina propria, consistent with  gastric rest    2.  Stomach, antrum, biopsy:  -Benign gastric mucosa with mild chronic inflammation  -A diff-quik stain for Helicobacter pylori is negative    3.  Large bowel, sigmoid, biopsy:  - Benign colonic mucosa    4. 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 Rectum, distal:  - Infiltrating poorly differentiated adenocarcinoma,  consistent with signet ring cell carcinoma    Verified by: SMITHA MANNING M.D.  (Electronic Signature)  Reported on: 06/05/19 14:36 EDT, [address] [place] [address]  _________________________________________________________________    Comment  5. Diagnosis discussed with Dr. Rodriguez on 6/4/19.              JAY CHAUHAN                        4        Surgical Final Report          MMR (by IHC) - Reporting Template (CAP October 1st, 2013)    Immunohistochemistry Testing (IHC) for Mismatch Repair Proteins  performed on tissue block 5A shows following result:    MLH1:  Intact nuclear expression  MSH2:  Intact nuclear expression  MSH6:  Intact nuclear expression  PMS2:  Intact nuclear expression    Interpretation: No loss of nuclear expression of MMR proteins  (MLH1, MSH2, MSH6, and PMS2).  These results show low probability  of microsatellite instability-high (MSI-H).  There is no evidence of DNA mismatch repair deficiency in the  analyzed tissue, indicating there is a low probability for Lazcano  syndrome (a common cause hereditary non polyposis colorectal  cancer or HNPCC). However, intact expression of all 4 proteins  indicates that MMR enzyme tested are intact but does not entirely  exclude Lazcano syndrome, as approximately 5 % of families may have  a missense mutation (especially MLH1) that can lead to a  nonfunctional protein with retained antigenicity.    Clinical correlation with further evaluation is suggested.  Comment: Background nonneoplastic tissue/internal control with  intact nuclear expression are present.    These immunohistochemical tests have been developed and their  performance characteristics determined by [facility],  Department of Pathology.  It has not been cleared or approved by  the U.S. Food and Drug Administration.  The FDA has determined  that such clearance or approval is not necessary.  This test is  used for clinical purposes.  The laboratory certified under the  CLIA-88 as qualified to perform high complexity clinical testing    This case represents a current or recent malignant diagnosis and  as such, a copy is being forwarded to the Tumor Registrar.  Please be reminded that all tumor registry cases must be  accurately staged and tracked.    -For inpatients, please complete the tumor staging form on the  patient's chart based on the clinical data which you have  compiled.    -Please remind your office to respond promptly to the Tumor  Registrar's request for patient follow-up.    All or portions of this case have undergone intradepartmental  review.            JAY CHAUHAN                        4        Surgical Final Report            (2)    Specimen(s) Submitted  1     Bxs duodenal bulb  2     Bx antrum  3     Bx sigmoid colon  4     Bx proximal rectum  5     Bx distal rectum    Gross Description  1.  The specimen is received in formalin and the specimen  container is labeled: Biopsy duodenal bulb.  It consists of two  pieces of tan soft tissue measuring 0.2 and 0.3 cm in greatest  dimension.  Entirely submitted.  One cassette.    2.  The specimen is received in formalin and the specimen  container is labeled: Biopsy antrum.  It consists of a cyst of  tan soft tissue measuring 0.1 and 0.3 cm in greatest dimension.  Entirely submitted.  One cassette.    3.  The specimen is received in formalin and the specimen  container is labeled: Biopsy sigmoid colon.  It consists of one  piece of tan soft tissue measuring 0.1 cm in greatest dimension.  Entirely submitted.  One cassette.    4.  The specimen is received in formalin and the specimen  container is labeled: Biopsy proximal rectum.  It consists of one  piece of tan soft tissue measuring 0.1 cm in greatest dimension.  Entirely submitted.  One cassette.    5.  The specimen is received in formalin and the specimen  container is labeled: Biopsy distal rectum.  It consists of one  piece of tan soft tissue measuring less than 0.1 cm in greatest  dimension.  Entirely submitted.  One cassette.    In addition to other data that may appear on the specimen  containers, all labels have been inspected to confirm the  presence of the patient's name and date of birth.  Melquiades 06/03/2019 13:22                JAY CHAUHAN                        4        AAddendum Report            Addendum Reason  To add a comment to the Comment Section.  No change to Final  Diagnosis.      Addendum Diagnosis  Comment:  Case discussed with Dr. Morse on 6/5/19.    SMITHA MANNING M.D.  (Electronic Signature) (05.31.19 @ 15:27)

## 2019-06-08 DIAGNOSIS — N32.89 OTHER SPECIFIED DISORDERS OF BLADDER: ICD-10-CM

## 2019-06-08 LAB
ANION GAP SERPL CALC-SCNC: 3 MMOL/L — LOW (ref 5–17)
BUN SERPL-MCNC: 16 MG/DL — SIGNIFICANT CHANGE UP (ref 7–23)
CALCIUM SERPL-MCNC: 8.9 MG/DL — SIGNIFICANT CHANGE UP (ref 8.5–10.1)
CHLORIDE SERPL-SCNC: 107 MMOL/L — SIGNIFICANT CHANGE UP (ref 96–108)
CO2 SERPL-SCNC: 35 MMOL/L — HIGH (ref 22–31)
CREAT SERPL-MCNC: 1.01 MG/DL — SIGNIFICANT CHANGE UP (ref 0.5–1.3)
GLUCOSE SERPL-MCNC: 107 MG/DL — HIGH (ref 70–99)
POTASSIUM SERPL-MCNC: 3.1 MMOL/L — LOW (ref 3.5–5.3)
POTASSIUM SERPL-SCNC: 3.1 MMOL/L — LOW (ref 3.5–5.3)
SODIUM SERPL-SCNC: 145 MMOL/L — SIGNIFICANT CHANGE UP (ref 135–145)

## 2019-06-08 PROCEDURE — 99231 SBSQ HOSP IP/OBS SF/LOW 25: CPT

## 2019-06-08 RX ORDER — POTASSIUM CHLORIDE 20 MEQ
40 PACKET (EA) ORAL ONCE
Refills: 0 | Status: COMPLETED | OUTPATIENT
Start: 2019-06-08 | End: 2019-06-08

## 2019-06-08 RX ADMIN — ATORVASTATIN CALCIUM 20 MILLIGRAM(S): 80 TABLET, FILM COATED ORAL at 21:51

## 2019-06-08 RX ADMIN — Medication 50 MILLIGRAM(S): at 17:27

## 2019-06-08 RX ADMIN — FAMOTIDINE 20 MILLIGRAM(S): 10 INJECTION INTRAVENOUS at 06:19

## 2019-06-08 RX ADMIN — HEPARIN SODIUM 5000 UNIT(S): 5000 INJECTION INTRAVENOUS; SUBCUTANEOUS at 14:34

## 2019-06-08 RX ADMIN — Medication 1 TABLET(S): at 12:26

## 2019-06-08 RX ADMIN — ZOLPIDEM TARTRATE 5 MILLIGRAM(S): 10 TABLET ORAL at 21:51

## 2019-06-08 RX ADMIN — Medication 200 MILLIGRAM(S): at 19:01

## 2019-06-08 RX ADMIN — FAMOTIDINE 20 MILLIGRAM(S): 10 INJECTION INTRAVENOUS at 17:27

## 2019-06-08 RX ADMIN — Medication 40 MILLIEQUIVALENT(S): at 17:27

## 2019-06-08 RX ADMIN — NYSTATIN CREAM 1 APPLICATION(S): 100000 CREAM TOPICAL at 17:16

## 2019-06-08 RX ADMIN — HEPARIN SODIUM 5000 UNIT(S): 5000 INJECTION INTRAVENOUS; SUBCUTANEOUS at 06:19

## 2019-06-08 RX ADMIN — HEPARIN SODIUM 5000 UNIT(S): 5000 INJECTION INTRAVENOUS; SUBCUTANEOUS at 21:52

## 2019-06-08 RX ADMIN — Medication 50 MILLIGRAM(S): at 06:19

## 2019-06-08 RX ADMIN — Medication 200 MILLIGRAM(S): at 02:31

## 2019-06-08 RX ADMIN — NYSTATIN CREAM 1 APPLICATION(S): 100000 CREAM TOPICAL at 06:20

## 2019-06-08 RX ADMIN — Medication 325 MILLIGRAM(S): at 12:26

## 2019-06-08 NOTE — PROGRESS NOTE ADULT - PROBLEM SELECTOR PLAN 1
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

## 2019-06-08 NOTE — PROGRESS NOTE ADULT - SUBJECTIVE AND OBJECTIVE BOX
This patient is an 86 year old male with hx. of CAD, s/p CABG about 5 years ago who presented with weakness and persistent diarrhea. CT of abdomen and Pelvis showed ? early sbo, thickened colon and Pelvic inflammation. The patient was receiving a bowel prep which he was having some difficulty taking. He acutely developed respiratory distress, ? vomiting ? aspiration and than had cardiac arrest Code Blue was called.  On arrival cpr was started , patient was emergently intubated. Patient received 1 epinephrine, approximately 5 minutes of cpr. Then ROSC was obtained. Patient did awake and follow commands after arrest, OG tube was placed. ekg no acute st changes.  Focused echo, good lv function, not on pressors    5/28: Patient following commands, Worsening L Infiltrate on CXR, Weaned for a while this morning  5/29: CXR improved and weaned well and extubated this AM.  Still with significant NGT drainage overnight  5/30: Doing well extubated.  Decreasing NGT drainage.  Coughing up some blood.    5/31: on NC O2, Increased PVC on CXR.  For EGD and Sigmoidoscopy today  6/1: Tolerating PO, No other events  6/2/19- Patient seen and examined at bedside with son. Patient states he feels tired/weak, no acute overnight events.  6/3/19- Patient seen and examined at bedside with son. Patient states he feels alright, patient tired, having trouble speaking, but able to communicate with hand gestures and writing. Patient states he feels hungry today.  6/4/19- Patient seen and examined at bedside. Patient resting comfortably in bed, but looks weak. Patient still with trouble speaking. Family states patient eating some food. Patient denies any CP, SOB.  6/5/19- Patient seen and examined at bedside. Patient resting in bed comfortably. Complains of mild abdominal pain today. Patient tolerating diet. Spoke with patient and family at bedside regarding pathology results. Explained we would need to run more tests and involve Heme/Onc, Surgery in care going forward. Patient expresses understanding. States "I don't know" when questioned how he felt after hearing the news. Emotional support provided by family. All questions answered.  6/8/19- Patient seen and examined at bedside. States he feels alright. Still having trouble speaking. Patient denies any pain today.   Physical Exam:  General: frail, weak  Neurology: A&Ox3, nonfocal, GREEN x 4  Respiratory: coarse breath sounds B/L  CV: RRR, S1S2  Abdominal: Soft, NT, ND +BS  Extremities: +edema B/L LE, + peripheral pulses      PHYSICAL EXAM:    Daily     Daily     ICU Vital Signs Last 24 Hrs  T(C): 37 (08 Jun 2019 13:23), Max: 37 (08 Jun 2019 13:23)  T(F): 98.6 (08 Jun 2019 13:23), Max: 98.6 (08 Jun 2019 13:23)  HR: 81 (08 Jun 2019 13:23) (63 - 88)  BP: 137/67 (08 Jun 2019 13:23) (128/65 - 144/64)  BP(mean): --  ABP: --  ABP(mean): --  RR: 17 (08 Jun 2019 13:23) (17 - 18)  SpO2: 100% (08 Jun 2019 13:23) (95% - 100%)                          11.7   9.21  )-----------( 302      ( 07 Jun 2019 07:36 )             37.3       CBC Full  -  ( 07 Jun 2019 07:36 )  WBC Count : 9.21 K/uL  RBC Count : 4.03 M/uL  Hemoglobin : 11.7 g/dL  Hematocrit : 37.3 %  Platelet Count - Automated : 302 K/uL  Mean Cell Volume : 92.6 fl  Mean Cell Hemoglobin : 29.0 pg  Mean Cell Hemoglobin Concentration : 31.4 gm/dL  Auto Neutrophil # : x  Auto Lymphocyte # : x  Auto Monocyte # : x  Auto Eosinophil # : x  Auto Basophil # : x  Auto Neutrophil % : x  Auto Lymphocyte % : x  Auto Monocyte % : x  Auto Eosinophil % : x  Auto Basophil % : x      06-08    145  |  107  |  16  ----------------------------<  107<H>  3.1<L>   |  35<H>  |  1.01    Ca    8.9      08 Jun 2019 06:30                              MEDICATIONS  (STANDING):  aspirin 325 milliGRAM(s) Oral daily  atorvastatin 20 milliGRAM(s) Oral at bedtime  dextrose 5%. 1000 milliLiter(s) (70 mL/Hr) IV Continuous <Continuous>  famotidine    Tablet 20 milliGRAM(s) Oral two times a day  heparin  Injectable 5000 Unit(s) SubCutaneous every 8 hours  metoprolol tartrate 50 milliGRAM(s) Oral two times a day  multivitamin 1 Tablet(s) Oral daily  nystatin Powder 1 Application(s) Topical two times a day  potassium chloride    Tablet ER 40 milliEquivalent(s) Oral once

## 2019-06-08 NOTE — PROGRESS NOTE ADULT - ASSESSMENT
#Hypoxic respiratory failure likely from aspiration.   -Continue Zosyn, NC O2  -Speech and swallow consult- dysphagia 1 diet  -Downgrade from ICU on 6/1/19    #Signet ring cell adenocarcinoma  -Found on rectal biopsy (proximal and distal rectum) during colonoscopy  -Colorectal Surgery consulted, appreciate recs- awaiting MRI  -Heme/Onc consulted, Radiation Oncology consulted  -CEA- elevated- 284    #S/P Cardiac arrest  #AT with brief AfibCardio consulted, appreciate recs  -Continue ASA 325mg  -Continue BB, statin  -Patient with recent hematuria, would hold off on A/C at this time, however CHADSVASC 4    #[place]  -Patient recently intubated- extubated 5/29  -ENT consulted- Dr. Gann, awaiting recs    # H/O hematuria  -CT shows b/l hydroureternephrosis with thickened bladder  -Pt will need outpatient cystoscopy with biopsy w/ Dr. Briseno to r/o malignancy- - spoke with Dr. Briseno who stated he will perform biopsy as outpatient  family requesting reconsult to see if biopsy can be done while inpatient    #CAD s/p CABG  Continue aspirin, lipitor    Anemia: Stable  -monitor    #HTN  Stable  Continue lopressor and Vasotec    # DVT prophylaxis-on heparin#disposition- pending MRI report rehab vs hospice, now IVf for hypernatremia    plan for rehab mon and then outpt RT and chemo if able to tolerate it  f/u urology for primary tumor in bladder

## 2019-06-08 NOTE — PROGRESS NOTE ADULT - ASSESSMENT
87yo male with rectal cancer  currently too weak for therapy  plan for rehab to see if can improve performance status prior to potential chemo vs xrt

## 2019-06-08 NOTE — PROGRESS NOTE ADULT - SUBJECTIVE AND OBJECTIVE BOX
Patient is a 86y old  Male who presents with a chief complaint of diarrhea (07 Jun 2019 14:58)      HPI:  pt awake  not able to speak currently but we communicate via hand signals  his son is present  no new complaints    PAST MEDICAL & SURGICAL HISTORY:  HLD (hyperlipidemia)  HTN (hypertension)  Osteoarthrosis  Hyperlipidemia  Hypertension  CAD (coronary artery disease)  H/O total knee replacement, right  History of cholecystectomy  S/P CABG x 5    MEDICATIONS  (STANDING):  aspirin 325 milliGRAM(s) Oral daily  atorvastatin 20 milliGRAM(s) Oral at bedtime  dextrose 5%. 1000 milliLiter(s) (70 mL/Hr) IV Continuous <Continuous>  famotidine    Tablet 20 milliGRAM(s) Oral two times a day  heparin  Injectable 5000 Unit(s) SubCutaneous every 8 hours  metoprolol tartrate 50 milliGRAM(s) Oral two times a day  multivitamin 1 Tablet(s) Oral daily  nystatin Powder 1 Application(s) Topical two times a day    MEDICATIONS  (PRN):  acetaminophen   Tablet .. 650 milliGRAM(s) Oral every 6 hours PRN Temp greater or equal to 38C (100.4F), Mild Pain (1 - 3)  guaiFENesin    Syrup 200 milliGRAM(s) Oral every 6 hours PRN Cough  loperamide 2 milliGRAM(s) Oral every 6 hours PRN Diarrhea  prochlorperazine   Injectable 5 milliGRAM(s) IV Push every 4 hours PRN nausea  simethicone 80 milliGRAM(s) Chew every 8 hours PRN Gas  zinc oxide 40% Ointment 1 Application(s) Topical every 4 hours PRN incontinent of stool and urine  zolpidem 5 milliGRAM(s) Oral at bedtime PRN Insomnia    Allergies  No Known Allergies    Vital Signs Last 24 Hrs  T(C): 36.5 (08 Jun 2019 04:56), Max: 36.9 (07 Jun 2019 18:50)  T(F): 97.7 (08 Jun 2019 04:56), Max: 98.4 (07 Jun 2019 18:50)  HR: 88 (08 Jun 2019 04:56) (63 - 88)  BP: 140/81 (08 Jun 2019 04:56) (128/65 - 144/64)  BP(mean): --  RR: 18 (08 Jun 2019 04:56) (18 - 18)  SpO2: 100% (08 Jun 2019 04:56) (95% - 100%)    PHYSICAL EXAM:    Constitutional: appears weak and chronically ill  Cardiovascular: S1 and S2  Gastrointestinal: BS+, soft      LABS:                        11.7   9.21  )-----------( 302      ( 07 Jun 2019 07:36 )             37.3     06-08    145  |  107  |  16  ----------------------------<  107<H>  3.1<L>   |  35<H>  |  1.01    Ca    8.9      08 Jun 2019 06:30            RADIOLOGY & ADDITIONAL STUDIES:

## 2019-06-08 NOTE — PROGRESS NOTE ADULT - SUBJECTIVE AND OBJECTIVE BOX
87yo/M with PMH CAD s/p CABG in 2005, HTN, hyperlipidemia,     His hospital course complicated by respiratory and cardiac arrest. now extubated and stable    colonoscopy revealed a rectal mass, biopsy signet ring adenocarcinoma    CT scan shows area of bladder wall thickening suspicious for bladder invasion. no obvious metastatic disease    the patient feeling better but having difficulty talking. he has not been able to ambulate yet    MRI pelvis shows a T3 lesion in the rectum without any lymph node involvement. T3N1M0 stage 2    the bladder thickening is on the anterolateral wall of the bladder and does not seem to be related to the rectal mass    the patient was well and independent prior to his current illness.      PAST MEDICAL & SURGICAL HISTORY:  HLD (hyperlipidemia)  HTN (hypertension)  Osteoarthrosis  Hyperlipidemia  Hypertension  CAD (coronary artery disease)  H/O total knee replacement, right  History of cholecystectomy  S/P CABG x 5      REVIEW OF SYSTEMS    General:	  Very weak  Hoarse voice  unable to phonate  dysuria  difficulty ambulaing        MEDICATIONS  (STANDING):  aspirin 325 milliGRAM(s) Oral daily  atorvastatin 20 milliGRAM(s) Oral at bedtime  dextrose 5%. 1000 milliLiter(s) (70 mL/Hr) IV Continuous <Continuous>  famotidine    Tablet 20 milliGRAM(s) Oral two times a day  heparin  Injectable 5000 Unit(s) SubCutaneous every 8 hours  metoprolol tartrate 50 milliGRAM(s) Oral two times a day  multivitamin 1 Tablet(s) Oral daily  nystatin Powder 1 Application(s) Topical two times a day    Vital Signs Last 24 Hrs  T(C): 36.3 (08 Jun 2019 17:15), Max: 37 (08 Jun 2019 13:23)  T(F): 97.4 (08 Jun 2019 17:15), Max: 98.6 (08 Jun 2019 13:23)  HR: 85 (08 Jun 2019 17:15) (80 - 88)  BP: 136/60 (08 Jun 2019 17:15) (128/65 - 140/81)  BP(mean): --  RR: 18 (08 Jun 2019 17:15) (17 - 18)  SpO2: 100% (08 Jun 2019 17:15) (95% - 100%)      Gen:  Ill appearing elderly male   hoarse  alert but unable to talk  daughter at bedside  chest clear  abdomen soft  ECOG 3                            11.7   9.21  )-----------( 302      ( 07 Jun 2019 07:36 )             37.3     06-08    145  |  107  |  16  ----------------------------<  107<H>  3.1<L>   |  35<H>  |  1.01    Ca    8.9      08 Jun 2019 06:30                  PATH:  adneocarcinoma wtih signet ring features        RADIOLOGY & ADDITIONAL STUDIES:    IMPRESSION:     Mild wall thickening of the colon raising concern for colitis. This does   not appear significantly changed. Suggestion of wall thickening with mild   nodularity in the gastric antrum. Underlying neoplasm cannot be excluded.    Progression of mild ascites and interval development of small bilateral   pleural effusions. Presacral and perirectal soft tissue stranding and   soft tissue stranding in the left lateral pelvis are unchanged when   compared with the prior study.    Asymmetric wall thickening of the right side of the bladder extending   posteriorly and anteriorly. Underlying neoplastic process, be excluded.   Bilateral mild to moderate hydronephrosis and mild hydroureter which may   be related to the bladder process. Please correlate clinically.    Additional findings as above.      MRI pelvis    IMPRESSION:    T3c, N0, Mx tumor in the mid rectum.    Mesorectal fascia/circumferential resection margin: Clear (tumor margin >   2 mm).    Sphincter involvement: Absent.     Asymmetric thickening of the right lateral urinary bladder wall   concerning for primary bladder neoplasm. Cystoscopic correlation is   advised.    Abnormal inflammatory enhancement in the bilateral obturator internus and   gluteus medius/minimus muscles with small areas of nonenhancement.   Differential includes myositis and/or pyomyositis.

## 2019-06-09 LAB
POTASSIUM SERPL-MCNC: 3.6 MMOL/L — SIGNIFICANT CHANGE UP (ref 3.5–5.3)
POTASSIUM SERPL-SCNC: 3.6 MMOL/L — SIGNIFICANT CHANGE UP (ref 3.5–5.3)

## 2019-06-09 PROCEDURE — 99233 SBSQ HOSP IP/OBS HIGH 50: CPT

## 2019-06-09 RX ORDER — ZOLPIDEM TARTRATE 10 MG/1
5 TABLET ORAL ONCE
Refills: 0 | Status: DISCONTINUED | OUTPATIENT
Start: 2019-06-09 | End: 2019-06-09

## 2019-06-09 RX ADMIN — Medication 325 MILLIGRAM(S): at 12:25

## 2019-06-09 RX ADMIN — Medication 50 MILLIGRAM(S): at 17:36

## 2019-06-09 RX ADMIN — HEPARIN SODIUM 5000 UNIT(S): 5000 INJECTION INTRAVENOUS; SUBCUTANEOUS at 22:36

## 2019-06-09 RX ADMIN — Medication 50 MILLIGRAM(S): at 05:51

## 2019-06-09 RX ADMIN — NYSTATIN CREAM 1 APPLICATION(S): 100000 CREAM TOPICAL at 05:52

## 2019-06-09 RX ADMIN — Medication 1 TABLET(S): at 12:25

## 2019-06-09 RX ADMIN — FAMOTIDINE 20 MILLIGRAM(S): 10 INJECTION INTRAVENOUS at 17:36

## 2019-06-09 RX ADMIN — NYSTATIN CREAM 1 APPLICATION(S): 100000 CREAM TOPICAL at 17:08

## 2019-06-09 RX ADMIN — HEPARIN SODIUM 5000 UNIT(S): 5000 INJECTION INTRAVENOUS; SUBCUTANEOUS at 05:51

## 2019-06-09 RX ADMIN — HEPARIN SODIUM 5000 UNIT(S): 5000 INJECTION INTRAVENOUS; SUBCUTANEOUS at 13:45

## 2019-06-09 RX ADMIN — ATORVASTATIN CALCIUM 20 MILLIGRAM(S): 80 TABLET, FILM COATED ORAL at 22:36

## 2019-06-09 RX ADMIN — ZOLPIDEM TARTRATE 5 MILLIGRAM(S): 10 TABLET ORAL at 22:36

## 2019-06-09 RX ADMIN — FAMOTIDINE 20 MILLIGRAM(S): 10 INJECTION INTRAVENOUS at 05:51

## 2019-06-09 NOTE — PROGRESS NOTE ADULT - ASSESSMENT
#Hypoxic respiratory failure likely from aspiration.   on  Zosyn, NC O2  -Speech and swallow consult- dysphagia 1 diet  -Downgrade from ICU on 6/1/19    #Signet ring cell adenocarcinoma  plan for rehab , after rehab if can tolerate RT and chemo and then surgical resection per oncology  and  colo surgery  family wants to explore [palliative options   -Found on rectal biopsy (proximal and distal rectum) during colonoscopy  -Colorectal Surgery consulted, appreciate recs-   -Heme/Onc consulted, Radiation Oncology consulted  -CEA- elevated- 284    # Cannot exclude primary bladder neoplasm    #S/P Cardiac arrest  #AT with brief Afib  Cardio consulted, appreciate recs  -Continue ASA 325mg  -Continue BB, statin  -Patient with recent hematuria, would hold off on A/C at this time, however CHADSVASC 4#Aphonia  -Patient recently intubated- extubated 5/29  -ENT consulted- Dr. Gann, awaiting recs    # H/O hematuria  -CT shows b/l hydroureternephrosis with thickened bladder  -Pt will need outpatient cystoscopy with biopsy w/ Dr. Briseno to r/o malignancy- - spoke with Dr. Briseno who stated he will perform biopsy as outpatient  family requesting reconsult to see if biopsy can be done while inpatient    #CAD s/p CABG  Continue aspirin, lipitor    Anemia: Stable  -monitor    #HTN  Stable  Continue lopressor and Vasotec    # DVT prophylaxis-on heparin#    disposition- palliative care consult pending #Hypoxic respiratory failure likely from aspiration.   completed  ELLYN Manriquez O2  -Speech and swallow consult- dysphagia 1 diet  -Downgrade from ICU on 6/1/19    #Signet ring cell adenocarcinoma  plan for rehab , after rehab if can tolerate RT and chemo and then surgical resection per oncology  and  colo surgery  family wants to explore [palliative options   -Found on rectal biopsy (proximal and distal rectum) during colonoscopy  -Colorectal Surgery consulted, appreciate recs-   -Heme/Onc consulted, Radiation Oncology consulted  -CEA- elevated- 284    # Cannot exclude primary bladder neoplasm  plan for outpatient bx and cystoscopy by dr ferguson  urology consult appreciated     #S/P Cardiac arrest  #AT with brief Afib  Cardio consulted, appreciate recs  -Continue ASA 325mg  -Continue BB, statin  -Patient with recent hematuria, would hold off on A/C at this time, however CHADSVASC 4    #Aphonia  -Patient recently intubated- extubated 5/29  -ENT consulted- Dr. Gann, awaiting recs    # H/O hematuria  -CT shows b/l hydroureternephrosis with thickened bladder  -Pt will need outpatient cystoscopy with biopsy w/ Dr. Briseno to r/o malignancy- - spoke with Dr. Briseno who stated he will perform biopsy as outpatient  family requesting reconsult to see if biopsy can be done while inpatient    #CAD s/p CABG  Continue aspirin, lipitor    Anemia: Stable  -monitor    #HTN  Stable  Continue lopressor and Vasotec    # DVT prophylaxis-on heparin#    disposition- palliative care consult pending

## 2019-06-09 NOTE — PROGRESS NOTE ADULT - SUBJECTIVE AND OBJECTIVE BOX
Pt denies abd pain, blood in stool     Vital Signs Last 24 Hrs  T(C): 36.4 (09 Jun 2019 04:27), Max: 37 (08 Jun 2019 13:23)  T(F): 97.6 (09 Jun 2019 04:27), Max: 98.6 (08 Jun 2019 13:23)  HR: 90 (09 Jun 2019 04:27) (75 - 90)  BP: 135/63 (09 Jun 2019 04:27) (134/73 - 137/67)  BP(mean): --  RR: 17 (09 Jun 2019 04:27) (17 - 18)  SpO2: 100% (09 Jun 2019 04:27) (100% - 100%)    abd soft, non distended, non tender

## 2019-06-09 NOTE — PROGRESS NOTE ADULT - SUBJECTIVE AND OBJECTIVE BOX
This patient is an 86 year old male with hx. of CAD, s/p CABG about 5 years ago who presented with weakness and persistent diarrhea. CT of abdomen and Pelvis showed ? early sbo, thickened colon and Pelvic inflammation. The patient was receiving a bowel prep which he was having some difficulty taking. He acutely developed respiratory distress, ? vomiting ? aspiration and than had cardiac arrest Code Blue was called.  On arrival cpr was started , patient was emergently intubated. Patient received 1 epinephrine, approximately 5 minutes of cpr. Then ROSC was obtained. Patient did awake and follow commands after arrest, OG tube was placed. ekg no acute st changes.  Focused echo, good lv function, not on pressors    5/28: Patient following commands, Worsening L Infiltrate on CXR, Weaned for a while this morning  5/29: CXR improved and weaned well and extubated this AM.  Still with significant NGT drainage overnight  5/30: Doing well extubated.  Decreasing NGT drainage.  Coughing up some blood.    5/31: on NC O2, Increased PVC on CXR.  For EGD and Sigmoidoscopy today  6/1: Tolerating PO, No other events  6/2/19- Patient seen and examined at bedside with son. Patient states he feels tired/weak, no acute overnight events.  6/3/19- Patient seen and examined at bedside with son. Patient states he feels alright, patient tired, having trouble speaking, but able to communicate with hand gestures and writing. Patient states he feels hungry today.  6/4/19- Patient seen and examined at bedside. Patient resting comfortably in bed, but looks weak. Patient still with trouble speaking. Family states patient eating some food. Patient denies any CP, SOB.  6/5/19- Patient seen and examined at bedside. Patient resting in bed comfortably. Complains of mild abdominal pain today. Patient tolerating diet. Spoke with patient and family at bedside regarding pathology results. Explained we would need to run more tests and involve Heme/Onc, Surgery in care going forward. Patient expresses understanding. States "I don't know" when questioned how he felt after hearing the news. Emotional support provided by family. All questions answered.  6/8/19- Patient seen and examined at bedside. States he feels alright. Still having trouble speaking. Patient denies any pain today.   Physical Exam:  General: frail, weakNeurology: A&Ox3, nonfocal, GREEN x 4  Respiratory: coarse breath sounds B/L  CV: RRR, S1S2  Abdominal: Soft, NT, ND +BS  Extremities: +edema B/L LE, + peripheral pulses This patient is an 86 year old male with hx. of CAD, s/p CABG about 5 years ago who presented with weakness and persistent diarrhea. CT of abdomen and Pelvis showed ? early sbo, thickened colon and Pelvic inflammation. The patient was receiving a bowel prep which he was having some difficulty taking. He acutely developed respiratory distress, ? vomiting ? aspiration and than had cardiac arrest Code Blue was called.  On arrival cpr was started , patient was emergently intubated. Patient received 1 epinephrine, approximately 5 minutes of cpr. Then ROSC was obtained. Patient did awake and follow commands after arrest, OG tube was placed. ekg no acute st changes.  Focused echo, good lv function, not on pressors    5/28: Patient following commands, Worsening L Infiltrate on CXR, Weaned for a while this morning  5/29: CXR improved and weaned well and extubated this AM.  Still with significant NGT drainage overnight  5/30: Doing well extubated.  Decreasing NGT drainage.  Coughing up some blood.    5/31: on NC O2, Increased PVC on CXR.  For EGD and Sigmoidoscopy today  6/1: Tolerating PO, No other events  6/2/19- Patient seen and examined at bedside with son. Patient states he feels tired/weak, no acute overnight events.  6/3/19- Patient seen and examined at bedside with son. Patient states he feels alright, patient tired, having trouble speaking, but able to communicate with hand gestures and writing. Patient states he feels hungry today.  6/4/19- Patient seen and examined at bedside. Patient resting comfortably in bed, but looks weak. Patient still with trouble speaking. Family states patient eating some food. Patient denies any CP, SOB.  6/5/19- Patient seen and examined at bedside. Patient resting in bed comfortably. Complains of mild abdominal pain today. Patient tolerating diet. Spoke with patient and family at bedside regarding pathology results. Explained we would need to run more tests and involve Heme/Onc, Surgery in care going forward. Patient expresses understanding. States "I don't know" when questioned how he felt after hearing the news. Emotional support provided by family. All questions answered.  6/9/19- Patient seen and examined at bedside. States he feels alright. Still having trouble speaking. Patient denies any pain today.   Physical Exam:  General: frail, weakNeurology: A&Ox3, nonfocal, GREEN x 4  Respiratory: coarse breath sounds B/L  CV: RRR, S1S2  Abdominal: Soft, NT, ND +BS  Extremities: +edema B/L LE, + peripheral pulses      PHYSICAL EXAM:    Daily     Daily     ICU Vital Signs Last 24 Hrs  T(C): 36.3 (09 Jun 2019 13:47), Max: 36.6 (08 Jun 2019 20:05)  T(F): 97.4 (09 Jun 2019 13:47), Max: 97.9 (08 Jun 2019 20:05)  HR: 82 (09 Jun 2019 13:47) (75 - 90)  BP: 133/69 (09 Jun 2019 13:47) (133/69 - 136/60)  BP(mean): --  ABP: --  ABP(mean): --  RR: 16 (09 Jun 2019 13:47) (16 - 18)  SpO2: 100% (09 Jun 2019 13:47) (100% - 100%)                  06-09    x   |  x   |  x   ----------------------------<  x   3.6   |  x   |  x     Ca    8.9      08 Jun 2019 06:30                              MEDICATIONS  (STANDING):  aspirin 325 milliGRAM(s) Oral daily  atorvastatin 20 milliGRAM(s) Oral at bedtime  dextrose 5%. 1000 milliLiter(s) (70 mL/Hr) IV Continuous <Continuous>  famotidine    Tablet 20 milliGRAM(s) Oral two times a day  heparin  Injectable 5000 Unit(s) SubCutaneous every 8 hours  metoprolol tartrate 50 milliGRAM(s) Oral two times a day  multivitamin 1 Tablet(s) Oral daily  nystatin Powder 1 Application(s) Topical two times a day

## 2019-06-10 LAB
ANION GAP SERPL CALC-SCNC: 6 MMOL/L — SIGNIFICANT CHANGE UP (ref 5–17)
BASOPHILS # BLD AUTO: 0.03 K/UL — SIGNIFICANT CHANGE UP (ref 0–0.2)
BASOPHILS NFR BLD AUTO: 0.3 % — SIGNIFICANT CHANGE UP (ref 0–2)
BUN SERPL-MCNC: 17 MG/DL — SIGNIFICANT CHANGE UP (ref 7–23)
CALCIUM SERPL-MCNC: 9 MG/DL — SIGNIFICANT CHANGE UP (ref 8.5–10.1)
CHLORIDE SERPL-SCNC: 109 MMOL/L — HIGH (ref 96–108)
CO2 SERPL-SCNC: 33 MMOL/L — HIGH (ref 22–31)
CREAT SERPL-MCNC: 0.91 MG/DL — SIGNIFICANT CHANGE UP (ref 0.5–1.3)
EOSINOPHIL # BLD AUTO: 0.02 K/UL — SIGNIFICANT CHANGE UP (ref 0–0.5)
EOSINOPHIL NFR BLD AUTO: 0.2 % — SIGNIFICANT CHANGE UP (ref 0–6)
GLUCOSE SERPL-MCNC: 95 MG/DL — SIGNIFICANT CHANGE UP (ref 70–99)
HCT VFR BLD CALC: 38 % — LOW (ref 39–50)
HGB BLD-MCNC: 11.6 G/DL — LOW (ref 13–17)
IMM GRANULOCYTES NFR BLD AUTO: 0.5 % — SIGNIFICANT CHANGE UP (ref 0–1.5)
LYMPHOCYTES # BLD AUTO: 0.56 K/UL — LOW (ref 1–3.3)
LYMPHOCYTES # BLD AUTO: 5.5 % — LOW (ref 13–44)
MCHC RBC-ENTMCNC: 28.8 PG — SIGNIFICANT CHANGE UP (ref 27–34)
MCHC RBC-ENTMCNC: 30.5 GM/DL — LOW (ref 32–36)
MCV RBC AUTO: 94.3 FL — SIGNIFICANT CHANGE UP (ref 80–100)
MONOCYTES # BLD AUTO: 0.69 K/UL — SIGNIFICANT CHANGE UP (ref 0–0.9)
MONOCYTES NFR BLD AUTO: 6.8 % — SIGNIFICANT CHANGE UP (ref 2–14)
NEUTROPHILS # BLD AUTO: 8.76 K/UL — HIGH (ref 1.8–7.4)
NEUTROPHILS NFR BLD AUTO: 86.7 % — HIGH (ref 43–77)
PLATELET # BLD AUTO: 257 K/UL — SIGNIFICANT CHANGE UP (ref 150–400)
POTASSIUM SERPL-MCNC: 3.6 MMOL/L — SIGNIFICANT CHANGE UP (ref 3.5–5.3)
POTASSIUM SERPL-SCNC: 3.6 MMOL/L — SIGNIFICANT CHANGE UP (ref 3.5–5.3)
RBC # BLD: 4.03 M/UL — LOW (ref 4.2–5.8)
RBC # FLD: 14.3 % — SIGNIFICANT CHANGE UP (ref 10.3–14.5)
SODIUM SERPL-SCNC: 148 MMOL/L — HIGH (ref 135–145)
WBC # BLD: 10.11 K/UL — SIGNIFICANT CHANGE UP (ref 3.8–10.5)
WBC # FLD AUTO: 10.11 K/UL — SIGNIFICANT CHANGE UP (ref 3.8–10.5)

## 2019-06-10 RX ORDER — MORPHINE SULFATE 50 MG/1
2 CAPSULE, EXTENDED RELEASE ORAL
Refills: 0 | Status: DISCONTINUED | OUTPATIENT
Start: 2019-06-10 | End: 2019-06-11

## 2019-06-10 RX ORDER — ZOLPIDEM TARTRATE 10 MG/1
5 TABLET ORAL AT BEDTIME
Refills: 0 | Status: DISCONTINUED | OUTPATIENT
Start: 2019-06-10 | End: 2019-06-10

## 2019-06-10 RX ORDER — SODIUM CHLORIDE 9 MG/ML
1000 INJECTION, SOLUTION INTRAVENOUS
Refills: 0 | Status: DISCONTINUED | OUTPATIENT
Start: 2019-06-10 | End: 2019-06-11

## 2019-06-10 RX ADMIN — Medication 325 MILLIGRAM(S): at 11:28

## 2019-06-10 RX ADMIN — Medication 1 TABLET(S): at 11:28

## 2019-06-10 RX ADMIN — Medication 0.5 MILLIGRAM(S): at 21:23

## 2019-06-10 RX ADMIN — NYSTATIN CREAM 1 APPLICATION(S): 100000 CREAM TOPICAL at 06:20

## 2019-06-10 RX ADMIN — NYSTATIN CREAM 1 APPLICATION(S): 100000 CREAM TOPICAL at 17:29

## 2019-06-10 RX ADMIN — FAMOTIDINE 20 MILLIGRAM(S): 10 INJECTION INTRAVENOUS at 17:30

## 2019-06-10 RX ADMIN — Medication 50 MILLIGRAM(S): at 06:20

## 2019-06-10 RX ADMIN — SODIUM CHLORIDE 50 MILLILITER(S): 9 INJECTION, SOLUTION INTRAVENOUS at 18:31

## 2019-06-10 RX ADMIN — HEPARIN SODIUM 5000 UNIT(S): 5000 INJECTION INTRAVENOUS; SUBCUTANEOUS at 06:20

## 2019-06-10 RX ADMIN — FAMOTIDINE 20 MILLIGRAM(S): 10 INJECTION INTRAVENOUS at 06:20

## 2019-06-10 RX ADMIN — Medication 2 MILLIGRAM(S): at 18:45

## 2019-06-10 RX ADMIN — Medication 50 MILLIGRAM(S): at 17:30

## 2019-06-10 NOTE — PROGRESS NOTE ADULT - SUBJECTIVE AND OBJECTIVE BOX
This patient is an 86 year old male with hx. of CAD, s/p CABG about 5 years ago who presented with weakness and persistent diarrhea. CT of abdomen and Pelvis showed ? early sbo, thickened colon and Pelvic inflammation. The patient was receiving a bowel prep which he was having some difficulty taking. He acutely developed respiratory distress, ? vomiting ? aspiration and than had cardiac arrest Code Blue was called.  On arrival cpr was started , patient was emergently intubated. Patient received 1 epinephrine, approximately 5 minutes of cpr. Then ROSC was obtained. Patient did awake and follow commands after arrest, OG tube was placed. ekg no acute st changes.  Focused echo, good lv function, not on pressors    5/28: Patient following commands, Worsening L Infiltrate on CXR, Weaned for a while this morning  5/29: CXR improved and weaned well and extubated this AM.  Still with significant NGT drainage overnight  5/30: Doing well extubated.  Decreasing NGT drainage.  Coughing up some blood.    5/31: on NC O2, Increased PVC on CXR.  For EGD and Sigmoidoscopy today  6/1: Tolerating PO, No other events  6/2/19- Patient seen and examined at bedside with son. Patient states he feels tired/weak, no acute overnight events.  6/3/19- Patient seen and examined at bedside with son. Patient states he feels alright, patient tired, having trouble speaking, but able to communicate with hand gestures and writing. Patient states he feels hungry today.  6/4/19- Patient seen and examined at bedside. Patient resting comfortably in bed, but looks weak. Patient still with trouble speaking. Family states patient eating some food. Patient denies any CP, SOB.  6/5/19- Patient seen and examined at bedside. Patient resting in bed comfortably. Complains of mild abdominal pain today. Patient tolerating diet. Spoke with patient and family at bedside regarding pathology results. Explained we would need to run more tests and involve Heme/Onc, Surgery in care going forward. Patient expresses understanding. States "I don't know" when questioned how he felt after hearing the news. Emotional support provided by family. All questions answered.  6/10/19- Patient seen and examined at bedside. 6 times diarrhea today, no blood in stools . Still having trouble speaking. Patient denies any pain today.   Physical Exam:  General: frail, weakNeurology: A&Ox3, nonfocal, GREEN x 4  Respiratory: coarse breath sounds B/LCV: RRR, S1S2  Abdominal: Soft, NT, ND +BS  Extremities: +edema B/L LE, + peripheral pulses      PHYSICAL EXAM:    Daily     Daily     ICU Vital Signs Last 24 Hrs  T(C): 36.7 (10 Dean 2019 13:45), Max: 36.7 (10 Dean 2019 13:45)  T(F): 98.1 (10 Dean 2019 13:45), Max: 98.1 (10 Dean 2019 13:45)  HR: 76 (10 Dean 2019 13:45) (68 - 106)  BP: 127/61 (10 Dean 2019 13:45) (124/69 - 133/60)  BP(mean): --  ABP: --  ABP(mean): --  RR: 16 (10 Dean 2019 13:45) (16 - 18)  SpO2: 100% (10 Dean 2019 13:45) (95% - 100%)                                11.6   10.11 )-----------( 257      ( 10 Dean 2019 12:06 )             38.0       CBC Full  -  ( 10 Dean 2019 12:06 )  WBC Count : 10.11 K/uL  RBC Count : 4.03 M/uL  Hemoglobin : 11.6 g/dL  Hematocrit : 38.0 %  Platelet Count - Automated : 257 K/uL  Mean Cell Volume : 94.3 fl  Mean Cell Hemoglobin : 28.8 pg  Mean Cell Hemoglobin Concentration : 30.5 gm/dL  Auto Neutrophil # : 8.76 K/uL  Auto Lymphocyte # : 0.56 K/uL  Auto Monocyte # : 0.69 K/uL  Auto Eosinophil # : 0.02 K/uL  Auto Basophil # : 0.03 K/uL  Auto Neutrophil % : 86.7 %  Auto Lymphocyte % : 5.5 %  Auto Monocyte % : 6.8 %  Auto Eosinophil % : 0.2 %  Auto Basophil % : 0.3 %      06-10    148<H>  |  109<H>  |  17  ----------------------------<  95  3.6   |  33<H>  |  0.91    Ca    9.0      10 Dean 2019 06:46                              MEDICATIONS  (STANDING):  aspirin 325 milliGRAM(s) Oral daily  dextrose 5%. 1000 milliLiter(s) (70 mL/Hr) IV Continuous <Continuous>  famotidine    Tablet 20 milliGRAM(s) Oral two times a day  metoprolol tartrate 50 milliGRAM(s) Oral two times a day  nystatin Powder 1 Application(s) Topical two times a day

## 2019-06-10 NOTE — PROGRESS NOTE ADULT - ASSESSMENT
#Hypoxic respiratory failure likely from aspiration.   completed  Mitra, [place] O2  -Speech and swallow consult- dysphagia 1 diet  -Downgrade from ICU on 6/1/19    # now with dehydration and more diarrhea and hypernatremia from dehydration  slow cautious IVF and check stool cx and c diff in lieu of recent abx treatment    #Signet ring cell adenocarcinoma  plan for rehab , after rehab if can tolerate RT and chemo and then surgical resection per oncology  and  colo surgery  family wants to explore [palliative options   -Found on rectal biopsy (proximal and distal rectum) during colonoscopy  -Colorectal Surgery consulted, appreciate recs-   -Heme/Onc consulted, Radiation Oncology consulted  -CEA- elevated- 284    # Cannot exclude primary bladder neoplasm  plan for outpatient bx and cystoscopy by dr ferguson  urology consult appreciated     #S/P Cardiac arrest  #AT with brief Afib  Cardio consulted, appreciate recs  -Continue ASA 325mg  -Continue BB, statin  -Patient with recent hematuria, would hold off on A/C at this time, however CHADSVASC 4    #Aphonia  -Patient recently intubated- extubated 5/29  -ENT consulted- Dr. Gann, awaiting recs    # H/O hematuria  -CT shows b/l hydroureternephrosis with thickened bladder  -Pt will need outpatient cystoscopy with biopsy w/ Dr. Briseno to r/o malignancy- - spoke with Dr. Briseno who stated he will perform biopsy as outpatient  family requesting reconsult to see if biopsy can be done while inpatient  #CAD s/p CABG  Continue aspirin, lipitorAnemia: Stable  -monitor    #HTN  Stable  Continue lopressor and Vasotec    # DVT prophylaxis-on heparin#    disposition-plan for hospice

## 2019-06-10 NOTE — CONSULT NOTE ADULT - SUBJECTIVE AND OBJECTIVE BOX
HPI: Pt is a 86y old Male with hx of CAD s/p CABG, HTN, OA, HTN was admitted to hospital from home on 5/21 with weakness and diarrhea.        PAIN: ( )Yes   ( )No  Level:  Location:  Intensity:    /10  Quality:  Aggravating Factors:  Alleviating Factors:  Radiation:  Duration/Timing:  Impact on ADLs:    DYSPNEA: ( ) Yes  ( ) No  Level:    PAST MEDICAL & SURGICAL HISTORY:  HLD (hyperlipidemia)  HTN (hypertension)  Osteoarthrosis  Hyperlipidemia  Hypertension  CAD (coronary artery disease)  H/O total knee replacement, right  History of cholecystectomy  S/P CABG x 5      SOCIAL HX:    Hx opiate tolerance ( )YES  ( )NO    Baseline ADLs  (Prior to Admission)  ( x) Independent   ( )Dependent    FAMILY HISTORY:  + Family history of premature CAD      Review of Systems:    Anxiety-  Depression-  Physical Discomfort-  Dyspnea-  Constipation-  Diarrhea-  Nausea-  Vomiting-  Anorexia-  Weight Loss-   Cough-  Secretions-  Fatigue-  Weakness-  Delirium-    All other systems reviewed and negative  Unable to obtain/Limited due to:      PHYSICAL EXAM:    Vital Signs Last 24 Hrs  T(C): 36.4 (10 Dean 2019 04:56), Max: 36.4 (10 Dean 2019 04:56)  T(F): 97.5 (10 Dean 2019 04:56), Max: 97.5 (10 Dean 2019 04:56)  HR: 106 (10 Dean 2019 04:56) (68 - 106)  BP: 124/69 (10 Dean 2019 04:56) (124/69 - 133/69)  RR: 17 (10 Dean 2019 04:56) (16 - 18)  SpO2: 95% (10 Dean 2019 04:56) (95% - 100%)    PPSV2: 40  %    General:  Mental Status:  HEENT:  Lungs:  Cardiac:  GI:  :  Ext:  Neuro:      LABS:                        11.6   10.11 )-----------( 257      ( 10 Dean 2019 12:06 )             38.0     06-10    148<H>  |  109<H>  |  17  ----------------------------<  95  3.6   |  33<H>  |  0.91    Ca    9.0      10 Dean 2019 06:46        Albumin:     Allergies  No Known Allergies    MEDICATIONS  (STANDING):  aspirin 325 milliGRAM(s) Oral daily  atorvastatin 20 milliGRAM(s) Oral at bedtime  dextrose 5%. 1000 milliLiter(s) (70 mL/Hr) IV Continuous <Continuous>  famotidine    Tablet 20 milliGRAM(s) Oral two times a day  heparin  Injectable 5000 Unit(s) SubCutaneous every 8 hours  metoprolol tartrate 50 milliGRAM(s) Oral two times a day  multivitamin 1 Tablet(s) Oral daily  nystatin Powder 1 Application(s) Topical two times a day    MEDICATIONS  (PRN):  acetaminophen   Tablet .. 650 milliGRAM(s) Oral every 6 hours PRN Temp greater or equal to 38C (100.4F), Mild Pain (1 - 3)  guaiFENesin    Syrup 200 milliGRAM(s) Oral every 6 hours PRN Cough  loperamide 2 milliGRAM(s) Oral every 6 hours PRN Diarrhea  prochlorperazine   Injectable 5 milliGRAM(s) IV Push every 4 hours PRN nausea  simethicone 80 milliGRAM(s) Chew every 8 hours PRN Gas  zinc oxide 40% Ointment 1 Application(s) Topical every 4 hours PRN incontinent of stool and urine  zolpidem 5 milliGRAM(s) Oral at bedtime PRN Insomnia      RADIOLOGY/ADDITIONAL STUDIES: HPI: Pt is a 86y old Male with hx of CAD s/p CABG, HTN, OA, HTN was admitted to hospital from home on 5/21 with weakness and diarrhea.  Imaging of his abdomen and Pelvis showed possible early sbo, thickened colon and Pelvic inflammation. While receiving a bowel prep which he was having some difficulty taking, he acutely developed respiratory distress, likely due to aspiration from vomiting and had cardiac arrest Code Blue was called.  Pt survived code and did not require pressors but was on vent but had good mentation.  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  Imaging also revealed bladder mass which was planned to be evaluated as an outpt.  Pt has been evaluated by many specialists including critical care, cardiology, GI, oncology, rad oncology, urology, colorectal surgery for his multitude of issues.  The proposed plan was o have pt go to [facility] to improve his functional status and then receive chemo and rt and then possibly surgery.  Appeared pt and family were unsure about that plan and palliative medicine was consulted for assistance with goals of care.      PAIN: ( )Yes   ( )No  Level:  Location:  Intensity:    /10  Quality:  Aggravating Factors:  Alleviating Factors:  Radiation:  Duration/Timing:  Impact on ADLs:    DYSPNEA: ( ) Yes  ( ) No  Level:    PAST MEDICAL & SURGICAL HISTORY:  HLD (hyperlipidemia)  HTN (hypertension)  Osteoarthrosis  Hyperlipidemia  Hypertension  CAD (coronary artery disease)  H/O total knee replacement, right  History of cholecystectomy  S/P CABG x 5      SOCIAL HX:    Hx opiate tolerance ( )YES  ( )NO    Baseline ADLs  (Prior to Admission)  ( x) Independent   ( )Dependent    FAMILY HISTORY:  + Family history of premature CAD      Review of Systems:    Anxiety-  Depression-  Physical Discomfort-  Dyspnea-  Constipation-  Diarrhea-  Nausea-  Vomiting-  Anorexia-  Weight Loss-   Cough-  Secretions-  Fatigue-  Weakness-  Delirium-    All other systems reviewed and negative  Unable to obtain/Limited due to:      PHYSICAL EXAM:    Vital Signs Last 24 Hrs  T(C): 36.4 (10 Dean 2019 04:56), Max: 36.4 (10 Dean 2019 04:56)  T(F): 97.5 (10 Dean 2019 04:56), Max: 97.5 (10 Dean 2019 04:56)  HR: 106 (10 Dean 2019 04:56) (68 - 106)  BP: 124/69 (10 Dean 2019 04:56) (124/69 - 133/69)  RR: 17 (10 Dean 2019 04:56) (16 - 18)  SpO2: 95% (10 Dean 2019 04:56) (95% - 100%)    PPSV2: 40  %    General:  Mental Status:  HEENT:  Lungs:  Cardiac:  GI:  :  Ext:  Neuro:      LABS:                        11.6   10.11 )-----------( 257      ( 10 Dean 2019 12:06 )             38.0     06-10    148<H>  |  109<H>  |  17  ----------------------------<  95  3.6   |  33<H>  |  0.91    Ca    9.0      10 Dean 2019 06:46        Albumin:     Allergies  No Known Allergies    MEDICATIONS  (STANDING):  aspirin 325 milliGRAM(s) Oral daily  atorvastatin 20 milliGRAM(s) Oral at bedtime  dextrose 5%. 1000 milliLiter(s) (70 mL/Hr) IV Continuous <Continuous>  famotidine    Tablet 20 milliGRAM(s) Oral two times a day  heparin  Injectable 5000 Unit(s) SubCutaneous every 8 hours  metoprolol tartrate 50 milliGRAM(s) Oral two times a day  multivitamin 1 Tablet(s) Oral daily  nystatin Powder 1 Application(s) Topical two times a day    MEDICATIONS  (PRN):  acetaminophen   Tablet .. 650 milliGRAM(s) Oral every 6 hours PRN Temp greater or equal to 38C (100.4F), Mild Pain (1 - 3)  guaiFENesin    Syrup 200 milliGRAM(s) Oral every 6 hours PRN Cough  loperamide 2 milliGRAM(s) Oral every 6 hours PRN Diarrhea  prochlorperazine   Injectable 5 milliGRAM(s) IV Push every 4 hours PRN nausea  simethicone 80 milliGRAM(s) Chew every 8 hours PRN Gas  zinc oxide 40% Ointment 1 Application(s) Topical every 4 hours PRN incontinent of stool and urine  zolpidem 5 milliGRAM(s) Oral at bedtime PRN Insomnia      RADIOLOGY/ADDITIONAL STUDIES:  EXAM:  MR PELVIS WAW IC                        PROCEDURE DATE:  06/06/2019    COMPARISON: CT abdomen pelvis 5/24/2019    FINDINGS:    TUMOR LOCATION: Mid (5.1-10.0 cm)  Distance of lowest extent of tumor from anal verge: 5.0 cm  Distance of lowest extent of tumor from top of anal sphincter: 1.9 cm  Relationship toanterior peritoneal reflection: At or straddles    TUMOR CHARACTERISTICS:  Craniocaudal extent: 5.1 cm (series 3 image 15)  Morphology: Circumferential  Circumferential extent/location (clock face): Circumferential  Mucin content: No or minimal mucincontent.    T STAGE:   Extramural depth of invasion: 7 mm as measured along the posterior wall   (series 4 image 13)  MR-T category: T3c (tumor penetrates 5 - 15 mm beyond muscularis propria)    Functional Sequences  DWI: Restricted diffusion in tumor or tumor bed is present.  DCE: N/A    EXTRAMURAL VASCULAR INVASION (EMVI): Absent.    CRM (FOR T3 ONLY):  ---Shortest distance of definitive tumor border/spiculations to the MRF:    8 mm. Location: Posterior (series 4 image 13)  Extramural depth of invasion at this level: 7 mm    Is there a separate tumor deposit, LN or EMVI threatening (? 1mm and ?   2mm) or invading (<1mm) the MRF: No    LYMPH NODES (suspicious criteria: round shape, irregular borders,   heterogeneous signal intensity):  Mesorectal/Superior Rectal Lymph Nodes: N0: No visible lymph   nodes/deposits  Extramesorectal lymph nodes (locoregional including internal iliac and   obturator; nonregional (M1) including external iliac, common iliac,   retroperitoneal, and inguinal): No  ADDITIONAL COMMENTS: Asymmetric thickening of the right lateral wall   concerning for primary bladder neoplasm. Cystoscopic correlation is   advised.  Abnormal inflammatory enhancement in the bilateral obturator internus and   gluteus medius/minimus muscles with small areas of nonenhancement.   Differential includes myositis and/or pyomyositis.    IMPRESSION:  T3c, N0, Mx tumor in the mid rectum.  Mesorectal fascia/circumferential resection margin: Clear (tumor margin >   2 mm).  Sphincter involvement: Absent.   Asymmetric thickening of the right lateral urinary bladder wall   concerning for primary bladder neoplasm. Cystoscopic correlation is   advised.  Abnormal inflammatory enhancement in the bilateral obturator internus and   gluteus medius/minimus muscles with small areas of nonenhancement.   Differential includes myositis and/or pyomyositis.    EXAM:  XR CHEST PORTABLE URGENT 1V                        PROCEDURE DATE:  05/31/2019    INTERPRETATION:  AP erect chest on May 31, 2019 at 8:27 AM. Study of May   29 showed left perihilar and left lower lobe infiltrate.    On present examination sternotomy again noted. Endotracheal tube present   on May 29 has been removed. Nasogastric tube remains.  There is an increasing infiltrate off the left hilum and there are now   mild to moderate developing effusions.    IMPRESSION: Endotracheal tube removed. Increasing lung and pleural   findings.      EXAM:  XR CHEST PORTABLE ROUTINE 1V                        PROCEDURE DATE:  05/29/2019    COMPARISON:  May 28, 2019    FINDINGS:  Endotracheal tube with tip above the rosa elena. Enteric tube with   tip in the stomach. Sternotomy wires and mediastinal clips.  Unremarkable cardiac and mediastinal contours. Small left pleural   effusion. Hazy opacity in the left perihilar and lower lung regions, more   ill-defined than on the prior study.    IMPRESSION:  Left perihilar and lower lung opacity suggestive of pneumonia in the   proper clinical setting.  Small left pleural effusion.    EXAM:  CT ABDOMEN AND PELVIS OC IC                        PROCEDURE DATE:  05/24/2019      FINDINGS:  LOWER CHEST: There is interval development of small bilateral pleural   effusions with adjacent atelectasis. Patient is status post sternotomy   and CABG.  LIVER: Within normal limits.  BILE DUCTS: Normal caliber.  GALLBLADDER: Cholecystectomy clips are present increased artifact in the   surrounding region.  SPLEEN: Within normal limits.  PANCREAS: There are a few calcifications within the tail of the pancreas.   Chronic pancreatitis cannot be excluded. Please correlate clinically.  ADRENALS: Within normal limits.  KIDNEYS/URETERS: Right renal cyst measuring up to 2.1 cm in the upper   pole laterally. Bilateral mild-to-moderate hydronephrosis and mild   hydroureter to the level of the pelvis. This is unchanged.    BLADDER: Again noted is marked asymmetric wall thickening of the right   side of the bladder extending anteriorly and posteriorly. Underlying   neoplastic process cannot be excluded.  REPRODUCTIVE ORGANS: Small calcifications in the prostate gland.   Infiltrative soft tissue changes in the presacral and perirectal region   are unchanged.    BOWEL: There is no bowel obstruction. Again noted is wall thickening   throughout the colon which is mild in appearance. There are regions of   underdistended sigmoid colon which limit evaluation. Appendix not well   visualized. No focal inflammatory changes are seen. There is mild   nodularity and wall thickening in the gastric antrum. Underlying neoplasm   cannot be excluded.  PERITONEUM: Mild ascites has increased. There is mild soft tissue   stranding in the left lateral pelvis which is unchanged. Bilateral   inguinal hernias containing fat are present.  VESSELS:  There are vascular calcifications. There is mild dilatation of   the distal abdominal aorta measuring up to approximately 2.5 cm when   compared with 2.0 cm more superiorly.  RETROPERITONEUM: No lymphadenopathy.    ABDOMINAL WALL: There is mild subcutaneous edema.  BONES: Degenerative changes of bone are present.    IMPRESSION:     Mild wall thickening of the colon raising concern for colitis. This does   not appear significantly changed. Suggestion of wall thickening with mild   nodularity in the gastric antrum. Underlying neoplasm cannot be excluded.  Progression of mild ascites and interval development of small bilateral   pleural effusions. Presacral and perirectal soft tissue stranding and   soft tissue stranding in the left lateral pelvis are unchanged when   compared with the prior study.  Asymmetric wall thickening of the right side of the bladder extending   posteriorly and anteriorly. Underlying neoplastic process, be excluded.   Bilateral mild to moderate hydronephrosis and mild hydroureter which may   be related to the bladder process. Please correlate clinically.    EXAM:  CT ABDOMEN AND PELVIS OC IC                        PROCEDURE DATE:  05/21/2019    FINDINGS:    LOWER CHEST: Coronary vascular calcification. Trace right pleural   effusion with minimal bibasilar atelectasis.    ABDOMEN AND PELVIS:  LIVER: within normal limits.  BILE DUCTS: Minimal biliary dilatation.  GALLBLADDER: Prior cholecystectomy.  PANCREAS: within normal limits.  SPLEEN: within normal limits.    ADRENALS: within normal limits.  KIDNEYS, URETERS AND BLADDER: Kidneys enhance bilaterally and   symmetrically. Bilateral chronic UPJ obstructions. Bilateral parapelvic   cysts. Exophytic anterior right upper pole renal cyst. Additional   subcentimeter hypodensities bilaterally to smallto characterize. No   intrarenal calculi. Ureters unremarkable. Marked thickening of the right   lateral and anterior bladder wall.    REPRODUCTIVE ORGANS: Extensive infiltrative changes in the pelvis and   presacral region with additional small ascites. Infiltrative changes   along the bilateral pelvic sidewall.    BOWEL: Pancolonic thickening with pericolonic infiltrative changes   compatible with pancolitis, differential including infectious versus   inflammatory etiology with consideration for C. Difficile infection.   Mildly prominent small bowel loops without evidence of obstruction.   Normal appendix. Thickening along the gastric antrum, may represent   gastritis however recommend direct visualization to exclude mucosal   abnormality.    PERITONEUM: no trace to small ascites. Extensive pelvic infiltrative   changes. No free air. No discrete drainable fluid collections.    VESSELS: Atherosclerotic changes .  RETROPERITONEUM: Within normal limits.      ABDOMINAL WALL: within normal limits.  BONES: Degenerative changes and osteopenia.    IMPRESSION:      Pancolonic thickening with pericolonic infiltrative changes compatible   with pancolitis, differential including infectious versus inflammatory   etiology with consideration for C. Difficile infection. Mildly prominent   small bowel loops without evidence of obstruction  Thickening along the gastric antrum, may represent gastritis however   recommend direct visualization to exclude mucosal abnormality.  Marked thickening along the right lateral and anterior bladder wall   recommend correlation with urinalysis as well as direct visualization to   exclude neoplasm.    Extensive infiltrate changes in the pelvic fat nonspecific in nature. HPI: Pt is a 86y old Male with hx of CAD s/p CABG, HTN, OA, HTN was admitted to hospital from home on 5/21 with weakness and diarrhea.  Imaging of his abdomen and Pelvis showed possible early sbo, thickened colon and Pelvic inflammation. While receiving a bowel prep which he was having some difficulty taking, he acutely developed respiratory distress, likely due to aspiration from vomiting and had cardiac arrest Code Blue was called.  Pt survived code and did not require pressors but was on vent but had good mentation.  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  Imaging also revealed bladder mass which was planned to be evaluated as an outpt.  Pt has been evaluated by many specialists including critical care, cardiology, GI, oncology, rad oncology, urology, colorectal surgery for his multitude of issues.  The proposed plan was o have pt go to [facility], [place] to improve his functional status and then receive chemo and rt and then possibly surgery.  Appeared pt and family were unsure about that plan and palliative medicine was consulted for assistance with goals of care.      PAIN: ( )Yes   (x )No    DYSPNEA: (x ) Yes  ( ) No  Level: mild- mod with any movement    PAST MEDICAL & SURGICAL HISTORY:  HLD (hyperlipidemia)  HTN (hypertension)  Osteoarthrosis  Hyperlipidemia  Hypertension  CAD (coronary artery disease)  H/O total knee replacement, right  History of cholecystectomy  S/P CABG x 5      SOCIAL HX: .  PTA lived at home alone.  Aide 2hrs/day for few weeks    Hx opiate tolerance ( )YES  (x )NO    Baseline ADLs  (Prior to Admission)  ( x) Independent   ( )Dependent    FAMILY HISTORY:  + Family history of premature CAD      Review of Systems:    Anxiety- mild- mod, anup at night  Depression- denies  Physical Discomfort- denies  Dyspnea- mod, intermittent  Constipation- denies  Diarrhea- severe  Nausea- denies  Vomiting- none recently  Anorexia- severe  Weight Loss- confounded due to edema  Cough- mild- mod  Secretions- mild, unable to expectorate  Fatigue- mild  Weakness- severe  Delirium- none    All other systems reviewed and negative    PHYSICAL EXAM:    Vital Signs Last 24 Hrs  T(C): 36.4 (10 Dean 2019 04:56), Max: 36.4 (10 Dean 2019 04:56)  T(F): 97.5 (10 Dean 2019 04:56), Max: 97.5 (10 Dean 2019 04:56)  HR: 106 (10 Dean 2019 04:56) (68 - 106)  BP: 124/69 (10 Dean 2019 04:56) (124/69 - 133/69)  RR: 17 (10 Dean 2019 04:56) (16 - 18)  SpO2: 95% (10 Dean 2019 04:56) (95% - 100%)    PPSV2: 30  %    General: pleasant elderly male, mostly comfortable  Mental Status: AOx4  HEENT: EOMI, very dry oral mucosa, hypophonic s/p intubation  Lungs: coarse breath sounds b/l  Cardiac: S1S2+  GI: +hyperactive bowel sounds, NT/palp  : voiding incontinent  Ext: moves all 4 exts but very limited LE due to weakness  Neuro: mentally intact, + hypophonia, + generalized weakness      LABS:                        11.6   10.11 )-----------( 257      ( 10 Dean 2019 12:06 )             38.0     06-10    148<H>  |  109<H>  |  17  ----------------------------<  95  3.6   |  33<H>  |  0.91    Ca    9.0      10 Dean 2019 06:46        Albumin:     Allergies  No Known Allergies    MEDICATIONS  (STANDING):  aspirin 325 milliGRAM(s) Oral daily  atorvastatin 20 milliGRAM(s) Oral at bedtime  dextrose 5%. 1000 milliLiter(s) (70 mL/Hr) IV Continuous <Continuous>  famotidine    Tablet 20 milliGRAM(s) Oral two times a day  heparin  Injectable 5000 Unit(s) SubCutaneous every 8 hours  metoprolol tartrate 50 milliGRAM(s) Oral two times a day  multivitamin 1 Tablet(s) Oral daily  nystatin Powder 1 Application(s) Topical two times a day    MEDICATIONS  (PRN):  acetaminophen   Tablet .. 650 milliGRAM(s) Oral every 6 hours PRN Temp greater or equal to 38C (100.4F), Mild Pain (1 - 3)  guaiFENesin    Syrup 200 milliGRAM(s) Oral every 6 hours PRN Cough  loperamide 2 milliGRAM(s) Oral every 6 hours PRN Diarrhea  prochlorperazine   Injectable 5 milliGRAM(s) IV Push every 4 hours PRN nausea  simethicone 80 milliGRAM(s) Chew every 8 hours PRN Gas  zinc oxide 40% Ointment 1 Application(s) Topical every 4 hours PRN incontinent of stool and urine  zolpidem 5 milliGRAM(s) Oral at bedtime PRN Insomnia      RADIOLOGY/ADDITIONAL STUDIES:  EXAM:  MR PELVIS WAW IC                        PROCEDURE DATE:  06/06/2019    COMPARISON: CT abdomen pelvis 5/24/2019    FINDINGS:    TUMOR LOCATION: Mid (5.1-10.0 cm)  Distance of lowest extent of tumor from anal verge: 5.0 cm  Distance of lowest extent of tumor from top of anal sphincter: 1.9 cm  Relationship toanterior peritoneal reflection: At or straddles    TUMOR CHARACTERISTICS:  Craniocaudal extent: 5.1 cm (series 3 image 15)  Morphology: Circumferential  Circumferential extent/location (clock face): Circumferential  Mucin content: No or minimal mucincontent.    T STAGE:   Extramural depth of invasion: 7 mm as measured along the posterior wall   (series 4 image 13)  MR-T category: T3c (tumor penetrates 5 - 15 mm beyond muscularis propria)    Functional Sequences  DWI: Restricted diffusion in tumor or tumor bed is present.  DCE: N/A    EXTRAMURAL VASCULAR INVASION (EMVI): Absent.    CRM (FOR T3 ONLY):  ---Shortest distance of definitive tumor border/spiculations to the MRF:    8 mm. Location: Posterior (series 4 image 13)  Extramural depth of invasion at this level: 7 mm    Is there a separate tumor deposit, LN or EMVI threatening (? 1mm and ?   2mm) or invading (<1mm) the MRF: No    LYMPH NODES (suspicious criteria: round shape, irregular borders,   heterogeneous signal intensity):  Mesorectal/Superior Rectal Lymph Nodes: N0: No visible lymph   nodes/deposits  Extramesorectal lymph nodes (locoregional including internal iliac and   obturator; nonregional (M1) including external iliac, common iliac,   retroperitoneal, and inguinal): No  ADDITIONAL COMMENTS: Asymmetric thickening of the right lateral wall   concerning for primary bladder neoplasm. Cystoscopic correlation is   advised.  Abnormal inflammatory enhancement in the bilateral obturator internus and   gluteus medius/minimus muscles with small areas of nonenhancement.   Differential includes myositis and/or pyomyositis.    IMPRESSION:  T3c, N0, Mx tumor in the mid rectum.  Mesorectal fascia/circumferential resection margin: Clear (tumor margin >   2 mm).  Sphincter involvement: Absent.   Asymmetric thickening of the right lateral urinary bladder wall   concerning for primary bladder neoplasm. Cystoscopic correlation is   advised.  Abnormal inflammatory enhancement in the bilateral obturator internus and   gluteus medius/minimus muscles with small areas of nonenhancement.   Differential includes myositis and/or pyomyositis.    EXAM:  XR CHEST PORTABLE URGENT 1V                        PROCEDURE DATE:  05/31/2019    INTERPRETATION:  AP erect chest on May 31, 2019 at 8:27 AM. Study of May   29 showed left perihilar and left lower lobe infiltrate.    On present examination sternotomy again noted. Endotracheal tube present   on May 29 has been removed. Nasogastric tube remains.  There is an increasing infiltrate off the left hilum and there are now   mild to moderate developing effusions.    IMPRESSION: Endotracheal tube removed. Increasing lung and pleural   findings.      EXAM:  XR CHEST PORTABLE ROUTINE 1V                        PROCEDURE DATE:  05/29/2019    COMPARISON:  May 28, 2019    FINDINGS:  Endotracheal tube with tip above the rosa elena. Enteric tube with   tip in the stomach. Sternotomy wires and mediastinal clips.  Unremarkable cardiac and mediastinal contours. Small left pleural   effusion. Hazy opacity in the left perihilar and lower lung regions, more   ill-defined than on the prior study.    IMPRESSION:  Left perihilar and lower lung opacity suggestive of pneumonia in the   proper clinical setting.  Small left pleural effusion.    EXAM:  CT ABDOMEN AND PELVIS OC IC                        PROCEDURE DATE:  05/24/2019      FINDINGS:  LOWER CHEST: There is interval development of small bilateral pleural   effusions with adjacent atelectasis. Patient is status post sternotomy   and CABG.  LIVER: Within normal limits.  BILE DUCTS: Normal caliber.  GALLBLADDER: Cholecystectomy clips are present increased artifact in the   surrounding region.  SPLEEN: Within normal limits.  PANCREAS: There are a few calcifications within the tail of the pancreas.   Chronic pancreatitis cannot be excluded. Please correlate clinically.  ADRENALS: Within normal limits.  KIDNEYS/URETERS: Right renal cyst measuring up to 2.1 cm in the upper   pole laterally. Bilateral mild-to-moderate hydronephrosis and mild   hydroureter to the level of the pelvis. This is unchanged.    BLADDER: Again noted is marked asymmetric wall thickening of the right   side of the bladder extending anteriorly and posteriorly. Underlying   neoplastic process cannot be excluded.  REPRODUCTIVE ORGANS: Small calcifications in the prostate gland.   Infiltrative soft tissue changes in the presacral and perirectal region   are unchanged.    BOWEL: There is no bowel obstruction. Again noted is wall thickening   throughout the colon which is mild in appearance. There are regions of   underdistended sigmoid colon which limit evaluation. Appendix not well   visualized. No focal inflammatory changes are seen. There is mild   nodularity and wall thickening in the gastric antrum. Underlying neoplasm   cannot be excluded.  PERITONEUM: Mild ascites has increased. There is mild soft tissue   stranding in the left lateral pelvis which is unchanged. Bilateral   inguinal hernias containing fat are present.  VESSELS:  There are vascular calcifications. There is mild dilatation of   the distal abdominal aorta measuring up to approximately 2.5 cm when   compared with 2.0 cm more superiorly.  RETROPERITONEUM: No lymphadenopathy.    ABDOMINAL WALL: There is mild subcutaneous edema.  BONES: Degenerative changes of bone are present.    IMPRESSION:     Mild wall thickening of the colon raising concern for colitis. This does   not appear significantly changed. Suggestion of wall thickening with mild   nodularity in the gastric antrum. Underlying neoplasm cannot be excluded.  Progression of mild ascites and interval development of small bilateral   pleural effusions. Presacral and perirectal soft tissue stranding and   soft tissue stranding in the left lateral pelvis are unchanged when   compared with the prior study.  Asymmetric wall thickening of the right side of the bladder extending   posteriorly and anteriorly. Underlying neoplastic process, be excluded.   Bilateral mild to moderate hydronephrosis and mild hydroureter which may   be related to the bladder process. Please correlate clinically.    EXAM:  CT ABDOMEN AND PELVIS OC IC                        PROCEDURE DATE:  05/21/2019    FINDINGS:    LOWER CHEST: Coronary vascular calcification. Trace right pleural   effusion with minimal bibasilar atelectasis.    ABDOMEN AND PELVIS:  LIVER: within normal limits.  BILE DUCTS: Minimal biliary dilatation.  GALLBLADDER: Prior cholecystectomy.  PANCREAS: within normal limits.  SPLEEN: within normal limits.    ADRENALS: within normal limits.  KIDNEYS, URETERS AND BLADDER: Kidneys enhance bilaterally and   symmetrically. Bilateral chronic UPJ obstructions. Bilateral parapelvic   cysts. Exophytic anterior right upper pole renal cyst. Additional   subcentimeter hypodensities bilaterally to smallto characterize. No   intrarenal calculi. Ureters unremarkable. Marked thickening of the right   lateral and anterior bladder wall.    REPRODUCTIVE ORGANS: Extensive infiltrative changes in the pelvis and   presacral region with additional small ascites. Infiltrative changes   along the bilateral pelvic sidewall.    BOWEL: Pancolonic thickening with pericolonic infiltrative changes   compatible with pancolitis, differential including infectious versus   inflammatory etiology with consideration for C. Difficile infection.   Mildly prominent small bowel loops without evidence of obstruction.   Normal appendix. Thickening along the gastric antrum, may represent   gastritis however recommend direct visualization to exclude mucosal   abnormality.    PERITONEUM: no trace to small ascites. Extensive pelvic infiltrative   changes. No free air. No discrete drainable fluid collections.    VESSELS: Atherosclerotic changes .  RETROPERITONEUM: Within normal limits.      ABDOMINAL WALL: within normal limits.  BONES: Degenerative changes and osteopenia.    IMPRESSION:      Pancolonic thickening with pericolonic infiltrative changes compatible   with pancolitis, differential including infectious versus inflammatory   etiology with consideration for C. Difficile infection. Mildly prominent   small bowel loops without evidence of obstruction  Thickening along the gastric antrum, may represent gastritis however   recommend direct visualization to exclude mucosal abnormality.  Marked thickening along the right lateral and anterior bladder wall   recommend correlation with urinalysis as well as direct visualization to   exclude neoplasm.    Extensive infiltrate changes in the pelvic fat nonspecific in nature. HPI: Pt is a 86y old Male with hx of CAD s/p CABG, HTN, OA, HTN was admitted to hospital from home on 5/21 with weakness and diarrhea.  Imaging of his abdomen and Pelvis showed possible early sbo, thickened colon and Pelvic inflammation. While receiving a bowel prep which he was having some difficulty taking, he acutely developed respiratory distress, likely due to aspiration from vomiting and had cardiac arrest Code Blue was called.  Pt survived code and did not require pressors but was on vent but had good mentation.  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  Imaging also revealed bladder mass which was planned to be evaluated as an outpt.  Pt has been evaluated by many specialists including critical care, cardiology, GI, oncology, rad oncology, urology, colorectal surgery for his multitude of issues.  The proposed plan was o have pt go to [facility] to improve his functional status and then receive chemo and rt and then possibly surgery.  Appeared pt and family were unsure about that plan and palliative medicine was consulted for assistance with goals of care.    Pt seen and examined by me for evaluation of goals of care with his 2 sons and daughter present.  Pt is hypophonic s/p extubation but able to express himself.  His children at bedside share he tends to not complain ever.  So initially he stated he was ok but tired.  On more detailed questioning he clarified presently he does not have pain but he does get pretty winded with any movement.  He also has a hard time sleeping and the Ambien he is taking is not working so well.  He admits he has a racing mind at night and even during the day gets a little anxious about his whole situation. He denies nausea or vomiting  He states his appetite is terrible and is very little.  He has diarrhea which has gotten worse over last few days. Denies abd pain, further vomiting.      PAIN: ( )Yes   (x )No    DYSPNEA: (x ) Yes  ( ) No  Level: mild- mod with any movement    PAST MEDICAL & SURGICAL HISTORY:  HLD (hyperlipidemia)  HTN (hypertension)  Osteoarthrosis  Hyperlipidemia  Hypertension  CAD (coronary artery disease)  H/O total knee replacement, right  History of cholecystectomy  S/P CABG x 5      SOCIAL HX: .  PTA lived at home alone.  Aide 2hrs/day for few weeks    Hx opiate tolerance ( )YES  (x )NO    Baseline ADLs  (Prior to Admission)  ( x) Independent   ( )Dependent    FAMILY HISTORY:  + Family history of premature CAD      Review of Systems:    Anxiety- mild- mod, anup at night  Depression- denies  Physical Discomfort- denies  Dyspnea- mod, intermittent  Constipation- denies  Diarrhea- severe  Nausea- denies  Vomiting- none recently  Anorexia- severe  Weight Loss- confounded due to edema  Cough- mild- mod  Secretions- mild, unable to expectorate  Fatigue- mild  Weakness- severe  Delirium- none    All other systems reviewed and negative    PHYSICAL EXAM:    Vital Signs Last 24 Hrs  T(C): 36.4 (10 Dean 2019 04:56), Max: 36.4 (10 Dean 2019 04:56)  T(F): 97.5 (10 Dean 2019 04:56), Max: 97.5 (10 Dean 2019 04:56)  HR: 106 (10 Dean 2019 04:56) (68 - 106)  BP: 124/69 (10 Dean 2019 04:56) (124/69 - 133/69)  RR: 17 (10 Dean 2019 04:56) (16 - 18)  SpO2: 95% (10 Dean 2019 04:56) (95% - 100%)    PPSV2: 30  %    General: pleasant elderly male, mostly comfortable  Mental Status: AOx4  HEENT: EOMI, very dry oral mucosa, hypophonic s/p intubation  Lungs: coarse breath sounds b/l  Cardiac: S1S2+  GI: +hyperactive bowel sounds, NT/palp  : voiding incontinent  Ext: moves all 4 exts but very limited LE due to weakness  Neuro: mentally intact, + hypophonia, + generalized weakness      LABS:                        11.6   10.11 )-----------( 257      ( 10 Dean 2019 12:06 )             38.0     06-10    148<H>  |  109<H>  |  17  ----------------------------<  95  3.6   |  33<H>  |  0.91    Ca    9.0      10 Dean 2019 06:46        Albumin:     Allergies  No Known Allergies    MEDICATIONS  (STANDING):  aspirin 325 milliGRAM(s) Oral daily  atorvastatin 20 milliGRAM(s) Oral at bedtime  dextrose 5%. 1000 milliLiter(s) (70 mL/Hr) IV Continuous <Continuous>  famotidine    Tablet 20 milliGRAM(s) Oral two times a day  heparin  Injectable 5000 Unit(s) SubCutaneous every 8 hours  metoprolol tartrate 50 milliGRAM(s) Oral two times a day  multivitamin 1 Tablet(s) Oral daily  nystatin Powder 1 Application(s) Topical two times a day    MEDICATIONS  (PRN):  acetaminophen   Tablet .. 650 milliGRAM(s) Oral every 6 hours PRN Temp greater or equal to 38C (100.4F), Mild Pain (1 - 3)  guaiFENesin    Syrup 200 milliGRAM(s) Oral every 6 hours PRN Cough  loperamide 2 milliGRAM(s) Oral every 6 hours PRN Diarrhea  prochlorperazine   Injectable 5 milliGRAM(s) IV Push every 4 hours PRN nausea  simethicone 80 milliGRAM(s) Chew every 8 hours PRN Gas  zinc oxide 40% Ointment 1 Application(s) Topical every 4 hours PRN incontinent of stool and urine  zolpidem 5 milliGRAM(s) Oral at bedtime PRN Insomnia      RADIOLOGY/ADDITIONAL STUDIES:  EXAM:  MR PELVIS WAW IC                        PROCEDURE DATE:  06/06/2019    COMPARISON: CT abdomen pelvis 5/24/2019    FINDINGS:    TUMOR LOCATION: Mid (5.1-10.0 cm)  Distance of lowest extent of tumor from anal verge: 5.0 cm  Distance of lowest extent of tumor from top of anal sphincter: 1.9 cm  Relationship toanterior peritoneal reflection: At or straddles    TUMOR CHARACTERISTICS:  Craniocaudal extent: 5.1 cm (series 3 image 15)  Morphology: Circumferential  Circumferential extent/location (clock face): Circumferential  Mucin content: No or minimal mucincontent.    T STAGE:   Extramural depth of invasion: 7 mm as measured along the posterior wall   (series 4 image 13)  MR-T category: T3c (tumor penetrates 5 - 15 mm beyond muscularis propria)    Functional Sequences  DWI: Restricted diffusion in tumor or tumor bed is present.  DCE: N/A    EXTRAMURAL VASCULAR INVASION (EMVI): Absent.    CRM (FOR T3 ONLY):  ---Shortest distance of definitive tumor border/spiculations to the MRF:    8 mm. Location: Posterior (series 4 image 13)  Extramural depth of invasion at this level: 7 mm    Is there a separate tumor deposit, LN or EMVI threatening (? 1mm and ?   2mm) or invading (<1mm) the MRF: No    LYMPH NODES (suspicious criteria: round shape, irregular borders,   heterogeneous signal intensity):  Mesorectal/Superior Rectal Lymph Nodes: N0: No visible lymph   nodes/deposits  Extramesorectal lymph nodes (locoregional including internal iliac and   obturator; nonregional (M1) including external iliac, common iliac,   retroperitoneal, and inguinal): No  ADDITIONAL COMMENTS: Asymmetric thickening of the right lateral wall   concerning for primary bladder neoplasm. Cystoscopic correlation is   advised.  Abnormal inflammatory enhancement in the bilateral obturator internus and   gluteus medius/minimus muscles with small areas of nonenhancement.   Differential includes myositis and/or pyomyositis.    IMPRESSION:  T3c, N0, Mx tumor in the mid rectum.  Mesorectal fascia/circumferential resection margin: Clear (tumor margin >   2 mm).  Sphincter involvement: Absent.   Asymmetric thickening of the right lateral urinary bladder wall   concerning for primary bladder neoplasm. Cystoscopic correlation is   advised.  Abnormal inflammatory enhancement in the bilateral obturator internus and   gluteus medius/minimus muscles with small areas of nonenhancement.   Differential includes myositis and/or pyomyositis.    EXAM:  XR CHEST PORTABLE URGENT 1V                        PROCEDURE DATE:  05/31/2019    INTERPRETATION:  AP erect chest on May 31, 2019 at 8:27 AM. Study of May   29 showed left perihilar and left lower lobe infiltrate.    On present examination sternotomy again noted. Endotracheal tube present   on May 29 has been removed. Nasogastric tube remains.  There is an increasing infiltrate off the left hilum and there are now   mild to moderate developing effusions.    IMPRESSION: Endotracheal tube removed. Increasing lung and pleural   findings.      EXAM:  XR CHEST PORTABLE ROUTINE 1V                        PROCEDURE DATE:  05/29/2019    COMPARISON:  May 28, 2019    FINDINGS:  Endotracheal tube with tip above the rosa elena. Enteric tube with   tip in the stomach. Sternotomy wires and mediastinal clips.  Unremarkable cardiac and mediastinal contours. Small left pleural   effusion. Hazy opacity in the left perihilar and lower lung regions, more   ill-defined than on the prior study.    IMPRESSION:  Left perihilar and lower lung opacity suggestive of pneumonia in the   proper clinical setting.  Small left pleural effusion.    EXAM:  CT ABDOMEN AND PELVIS OC IC                        PROCEDURE DATE:  05/24/2019      FINDINGS:  LOWER CHEST: There is interval development of small bilateral pleural   effusions with adjacent atelectasis. Patient is status post sternotomy   and CABG.  LIVER: Within normal limits.  BILE DUCTS: Normal caliber.  GALLBLADDER: Cholecystectomy clips are present increased artifact in the   surrounding region.  SPLEEN: Within normal limits.  PANCREAS: There are a few calcifications within the tail of the pancreas.   Chronic pancreatitis cannot be excluded. Please correlate clinically.  ADRENALS: Within normal limits.  KIDNEYS/URETERS: Right renal cyst measuring up to 2.1 cm in the upper   pole laterally. Bilateral mild-to-moderate hydronephrosis and mild   hydroureter to the level of the pelvis. This is unchanged.    BLADDER: Again noted is marked asymmetric wall thickening of the right   side of the bladder extending anteriorly and posteriorly. Underlying   neoplastic process cannot be excluded.  REPRODUCTIVE ORGANS: Small calcifications in the prostate gland.   Infiltrative soft tissue changes in the presacral and perirectal region   are unchanged.    BOWEL: There is no bowel obstruction. Again noted is wall thickening   throughout the colon which is mild in appearance. There are regions of   underdistended sigmoid colon which limit evaluation. Appendix not well   visualized. No focal inflammatory changes are seen. There is mild   nodularity and wall thickening in the gastric antrum. Underlying neoplasm   cannot be excluded.  PERITONEUM: Mild ascites has increased. There is mild soft tissue   stranding in the left lateral pelvis which is unchanged. Bilateral   inguinal hernias containing fat are present.  VESSELS:  There are vascular calcifications. There is mild dilatation of   the distal abdominal aorta measuring up to approximately 2.5 cm when   compared with 2.0 cm more superiorly.  RETROPERITONEUM: No lymphadenopathy.    ABDOMINAL WALL: There is mild subcutaneous edema.  BONES: Degenerative changes of bone are present.    IMPRESSION:     Mild wall thickening of the colon raising concern for colitis. This does   not appear significantly changed. Suggestion of wall thickening with mild   nodularity in the gastric antrum. Underlying neoplasm cannot be excluded.  Progression of mild ascites and interval development of small bilateral   pleural effusions. Presacral and perirectal soft tissue stranding and   soft tissue stranding in the left lateral pelvis are unchanged when   compared with the prior study.  Asymmetric wall thickening of the right side of the bladder extending   posteriorly and anteriorly. Underlying neoplastic process, be excluded.   Bilateral mild to moderate hydronephrosis and mild hydroureter which may   be related to the bladder process. Please correlate clinically.    EXAM:  CT ABDOMEN AND PELVIS OC IC                        PROCEDURE DATE:  05/21/2019    FINDINGS:    LOWER CHEST: Coronary vascular calcification. Trace right pleural   effusion with minimal bibasilar atelectasis.    ABDOMEN AND PELVIS:  LIVER: within normal limits.  BILE DUCTS: Minimal biliary dilatation.  GALLBLADDER: Prior cholecystectomy.  PANCREAS: within normal limits.  SPLEEN: within normal limits.    ADRENALS: within normal limits.  KIDNEYS, URETERS AND BLADDER: Kidneys enhance bilaterally and   symmetrically. Bilateral chronic UPJ obstructions. Bilateral parapelvic   cysts. Exophytic anterior right upper pole renal cyst. Additional   subcentimeter hypodensities bilaterally to smallto characterize. No   intrarenal calculi. Ureters unremarkable. Marked thickening of the right   lateral and anterior bladder wall.    REPRODUCTIVE ORGANS: Extensive infiltrative changes in the pelvis and   presacral region with additional small ascites. Infiltrative changes   along the bilateral pelvic sidewall.    BOWEL: Pancolonic thickening with pericolonic infiltrative changes   compatible with pancolitis, differential including infectious versus   inflammatory etiology with consideration for C. Difficile infection.   Mildly prominent small bowel loops without evidence of obstruction.   Normal appendix. Thickening along the gastric antrum, may represent   gastritis however recommend direct visualization to exclude mucosal   abnormality.    PERITONEUM: no trace to small ascites. Extensive pelvic infiltrative   changes. No free air. No discrete drainable fluid collections.    VESSELS: Atherosclerotic changes .  RETROPERITONEUM: Within normal limits.      ABDOMINAL WALL: within normal limits.  BONES: Degenerative changes and osteopenia.    IMPRESSION:      Pancolonic thickening with pericolonic infiltrative changes compatible   with pancolitis, differential including infectious versus inflammatory   etiology with consideration for C. Difficile infection. Mildly prominent   small bowel loops without evidence of obstruction  Thickening along the gastric antrum, may represent gastritis however   recommend direct visualization to exclude mucosal abnormality.  Marked thickening along the right lateral and anterior bladder wall   recommend correlation with urinalysis as well as direct visualization to   exclude neoplasm.    Extensive infiltrate changes in the pelvic fat nonspecific in nature.

## 2019-06-10 NOTE — CONSULT NOTE ADULT - ASSESSMENT
EXAM:  MR PELVIS [facility]                            PROCEDURE DATE:  06/06/2019          INTERPRETATION:  MR PELVIS WITHOUT AND WITH IV CONTRAST    CLINICAL INFORMATION: C20 Rectal Cancer for initial local staging.    PROCEDURE:  MR of the pelvis was performed with and without IV contrast as per Rectal   Cancer Protocol including thin section T2 weighted imaging.  IV Contrast: Gadavist. 10 cc administered, 0 cc discarded.  Levsin 0.25 mg administered sublingual.  Rectal Gel: 60 cc rectal gel administered.    COMPARISON: CT abdomen pelvis 5/24/2019    FINDINGS:    TUMOR LOCATION: Mid (5.1-10.0 cm)  Distance of lowest extent of tumor from anal verge: 5.0 cm  Distance of lowest extent of tumor from top of anal sphincter: 1.9 cm  Relationship toanterior peritoneal reflection: At or straddles    TUMOR CHARACTERISTICS:  Craniocaudal extent: 5.1 cm (series 3 image 15)  Morphology: Circumferential  Circumferential extent/location (clock face): Circumferential  Mucin content: No or minimal mucincontent.    T STAGE:   Extramural depth of invasion: 7 mm as measured along the posterior wall   (series 4 image 13)  MR-T category: T3c (tumor penetrates 5 - 15 mm beyond muscularis propria)    Functional Sequences  DWI: Restricted diffusion in tumor or tumor bed is present.  DCE: N/A    EXTRAMURAL VASCULAR INVASION (EMVI): Absent.    CRM (FOR T3 ONLY):  ---Shortest distance of definitive tumor border/spiculations to the MRF:    8 mm. Location: Posterior (series 4 image 13)  Extramural depth of invasion at this level: 7 mm    Is there a separate tumor deposit, LN or EMVI threatening (? 1mm and ?   2mm) or invading (<1mm) the MRF: No    LYMPH NODES (suspicious criteria: round shape, irregular borders,   heterogeneous signal intensity):    Mesorectal/Superior Rectal Lymph Nodes: N0: No visible lymph   nodes/deposits    Extramesorectal lymph nodes (locoregional including internal iliac and   obturator; nonregional (M1) including external iliac, common iliac,   retroperitoneal, and inguinal): No    ADDITIONAL COMMENTS: Asymmetric thickening of the right lateral wall   concerning for primary bladder neoplasm. Cystoscopic correlation is   advised.    Abnormal inflammatory enhancement in the bilateral obturator internus and   gluteus medius/minimus muscles with small areas of nonenhancement.   Differential includes myositis and/or pyomyositis.    IMPRESSION:    T3c, N0, Mx tumor in the mid rectum.    Mesorectal fascia/circumferential resection margin: Clear (tumor margin >   2 mm).    Sphincter involvement: Absent.     Asymmetric thickening of the right lateral urinary bladder wall   concerning for primary bladder neoplasm. Cystoscopic correlation is   advised.    Abnormal inflammatory enhancement in the bilateral obturator internusand   gluteus medius/minimus muscles with small areas of nonenhancement.   Differential includes myositis and/or pyomyositis.    < from: Xray Chest 1 View- PORTABLE-Urgent (05.31.19 @ 09:32) >  EXAM:  XR CHEST PORTABLE URGENT 1V                            PROCEDURE DATE:  05/31/2019          INTERPRETATION:  AP erect chest on May 31, 2019 at 8:27 AM. Study of May   29 showed left perihilar and left lower lobe infiltrate.    On present examination sternotomy again noted. Endotracheal tube present   on May 29 has been removed. Nasogastric tube remains.    There is an increasing infiltrate off the left hilum and there are now   mild to moderate developing effusions.    IMPRESSION: Endotracheal tube removed. Increasing lung and pleural   findings.    < end of copied text >    < from: Xray Chest 1 View- PORTABLE-Routine (05.29.19 @ 11:39) >  EXAM:  XR CHEST PORTABLE ROUTINE 1V                            PROCEDURE DATE:  05/29/2019          INTERPRETATION:  Clinical information: Aspiration    Portable AP chest radiograph from 0933 hours:    COMPARISON:  May 28, 2019    FINDINGS:  Endotracheal tube with tip above the rosa elena. Enteric tube with   tip in the stomach. Sternotomy wires and mediastinal clips.    Unremarkable cardiac and mediastinal contours. Small left pleural   effusion. Hazy opacity in the left perihilar and lower lung regions, more   ill-defined than on the prior study.    IMPRESSION:  Left perihilar and lower lung opacity suggestive of pneumonia in the   proper clinical setting.  Small left pleural effusion.    < end of copied text >  < from: CT Abdomen and Pelvis w/ Oral Cont and w/ IV Cont (05.24.19 @ 12:53) >  EXAM:  CT ABDOMEN AND PELVIS OC IC                            PROCEDURE DATE:  05/24/2019          INTERPRETATION:  CLINICAL INFORMATION: Colitis with worsening abdominal   distention    COMPARISON: CT scan of the abdomen and pelvis from May 21, 2019    PROCEDURE:   CT of the Abdomen and Pelvis was performed with intravenous contrast.   Intravenous contrast: 90 ml Omnipaque 350. 10 ml discarded.  Oral contrast: None.  Sagittal and coronal reformats were performed.    FINDINGS:    LOWER CHEST: There is interval development of small bilateral pleural   effusions with adjacent atelectasis. Patient is status post sternotomy   and CABG.    LIVER: Within normal limits.  BILE DUCTS: Normal caliber.  GALLBLADDER: Cholecystectomy clips are present increased artifact in the   surrounding region.  SPLEEN: Within normal limits.  PANCREAS: There are a few calcifications within the tail of the pancreas.   Chronic pancreatitis cannot be excluded. Please correlate clinically.  ADRENALS: Within normal limits.  KIDNEYS/URETERS: Right renal cyst measuring up to 2.1 cm in the upper   pole laterally. Bilateral mild-to-moderate hydronephrosis and mild   hydroureter to the level of the pelvis. This is unchanged.    BLADDER: Again noted is marked asymmetric wall thickening of the right   side of the bladder extending anteriorly and posteriorly. Underlying   neoplastic process cannot be excluded.  REPRODUCTIVE ORGANS: Small calcifications in the prostate gland.   Infiltrative soft tissue changes in the presacral and perirectal region   are unchanged.    BOWEL: There is no bowel obstruction. Again noted is wall thickening   throughout the colon which is mild in appearance. There are regions of   underdistended sigmoid colon which limit evaluation. Appendix not well   visualized. No focal inflammatory changes are seen. There is mild   nodularity and wall thickening in the gastric antrum. Underlying neoplasm   cannot be excluded.  PERITONEUM: Mild ascites has increased. There is mild soft tissue   stranding in the left lateral pelvis which is unchanged. Bilateral   inguinal hernias containing fat are present.  VESSELS:  There are vascular calcifications. There is mild dilatation of   the distal abdominal aorta measuring up to approximately 2.5 cm when   compared with 2.0 cm more superiorly.  RETROPERITONEUM: No lymphadenopathy.    ABDOMINAL WALL: There is mild subcutaneous edema.  BONES: Degenerative changes of bone are present.    IMPRESSION:     Mild wall thickening of the colon raising concern for colitis. This does   not appear significantly changed. Suggestion of wall thickening with mild   nodularity in the gastric antrum. Underlying neoplasm cannot be excluded.    Progression of mild ascites and interval development of small bilateral   pleural effusions. Presacral and perirectal soft tissue stranding and   soft tissue stranding in the left lateral pelvis are unchanged when   compared with the prior study.    Asymmetric wall thickening of the right side of the bladder extending   posteriorly and anteriorly. Underlying neoplastic process, be excluded.   Bilateral mild to moderate hydronephrosis and mild hydroureter which may   be related to the bladder process. Please correlate clinically.    < end of copied text >  < from: CT Abdomen and Pelvis w/ Oral Cont and w/ IV Cont (05.21.19 @ 14:15) >  EXAM:  CT ABDOMEN AND PELVIS OC IC                            PROCEDURE DATE:  05/21/2019          INTERPRETATION:  DATE: 5/21/2019 2:15 PM  COMPARISON: None  CLINICAL INFORMATION: Diarrhea, abdominal distention  PROCEDURE:  CT of the Abdomen and Pelvis was performed withintravenous   contrast.  90 ml of Omnipaque 350 was injected intravenously. Oral   contrast was administered        FINDINGS:    LOWER CHEST: Coronary vascular calcification. Trace right pleural   effusion with minimal bibasilar atelectasis.    ABDOMEN AND PELVIS:    LIVER: within normal limits.  BILE DUCTS: Minimal biliary dilatation.  GALLBLADDER: Prior cholecystectomy.  PANCREAS: within normal limits.  SPLEEN: within normal limits.    ADRENALS: within normal limits.  KIDNEYS, URETERS AND BLADDER: Kidneys enhance bilaterally and   symmetrically. Bilateral chronic UPJ obstructions. Bilateral parapelvic   cysts. Exophytic anterior right upper pole renal cyst. Additional   subcentimeter hypodensities bilaterally to smallto characterize. No   intrarenal calculi. Ureters unremarkable. Marked thickening of the right   lateral and anterior bladder wall.    REPRODUCTIVE ORGANS: Extensive infiltrative changes in the pelvis and   presacral region with additional small ascites. Infiltrative changes   along the bilateral pelvic sidewall.    BOWEL: Pancolonic thickening with pericolonic infiltrative changes   compatible with pancolitis, differential including infectious versus   inflammatory etiology with consideration for C. Difficile infection.   Mildly prominent small bowel loops without evidence of obstruction.   Normal appendix. Thickening along the gastric antrum, may represent   gastritis however recommend direct visualization to exclude mucosal   abnormality.    PERITONEUM: no trace to small ascites. Extensive pelvic infiltrative   changes. No free air. No discrete drainable fluid collections.    VESSELS: Atherosclerotic changes .  RETROPERITONEUM: Within normal limits.      ABDOMINAL WALL: within normal limits.  BONES: Degenerative changes and osteopenia.    IMPRESSION:      Pancolonic thickening with pericolonic infiltrative changes compatible   with pancolitis, differential including infectious versus inflammatory   etiology with consideration for C. Difficile infection. Mildly prominent   small bowel loops without evidence of obstruction    Thickening along the gastric antrum, may represent gastritis however   recommend direct visualization to exclude mucosal abnormality.      Marked thickeningalong the right lateral and anterior bladder wall   recommend correlation with urinalysis as well as direct visualization to   exclude neoplasm.    Extensive infiltrate changes in the pelvic fat nonspecific in nature.    < end of copied text > Pt is a 86y old Male with hx of CAD s/p CABG, HTN, OA, HTN was admitted to hospital from home on 5/21 with weakness and diarrhea.  Imaging of his abdomen and Pelvis showed possible early sbo, thickened colon and Pelvic inflammation. While receiving a bowel prep which he was having some difficulty taking, he acutely developed respiratory distress, likely due to aspiration from vomiting and had cardiac arrest Code Blue was called.  Pt survived code and did not require pressors but was on vent but had good mentation.  Pt had GI bleeding for which he had EGD and colonoscopy and bx of colon lesion came back for poorly differentiated adenocarcinoma.  Imaging also revealed bladder mass which was planned to be evaluated as an outpt.  Pt has been evaluated by many specialists including critical care, cardiology, GI, oncology, rad oncology, urology, colorectal surgery for his multitude of issues.  The proposed plan was o have pt go to [facility] to improve his functional status and then receive chemo and rt and then possibly surgery.  Appeared pt and family were unsure about that plan and palliative medicine was consulted for assistance with goals of care. Pt is a 86y old Male with hx of CAD s/p CABG, HTN, OA, HTN was admitted to hospital from home on 5/21 with weakness and diarrhea.  Imaging of his abdomen and Pelvis showed possible early sbo, thickened colon and Pelvic inflammation. While receiving a bowel prep which he was having some difficulty taking, he acutely developed respiratory distress, likely due to aspiration from vomiting and had cardiac arrest Code Blue was called.  Pt survived code and did not require pressors but was on vent but had good mentation.  Pt had GI bleeding for which he had EGD and colonoscopy and bx of colon lesion came back for poorly differentiated adenocarcinoma.  Imaging also revealed bladder mass which was planned to be evaluated as an outpt.  Pt has been evaluated by many specialists including critical care, cardiology, GI, oncology, rad oncology, urology, colorectal surgery for his multitude of issues.  The proposed plan was o have pt go to [facility] to improve his functional status and then receive chemo and rt and then possibly surgery.  Appeared pt and family were unsure about that plan and palliative medicine was consulted for assistance with goals of care.    1)Resp Distress  -s/p pneumonia with residual cough and significant difficulty expectorating  -Cont supplemental oxygen  -Occurs with exertion  -Pt is hesitant to report symptoms so encourage staff to assess frequently  -Also exacerbated by cough  -Add Morphine 2mg IV q 3hr PRN    2)Cough  -Likely residual form pneumonia, intubation  -Difficulty expectoration sputum  -Causes some SOB  -Morphine as noted above for symptomatic relief    3)Anxiety/Insomnia  -Has been taking Ambien with poor result  -Related to nightime anxiety  -D/c Ambien and start PRN ativan as his insomnia is mostly related to anxiety    4)Diarrhea  -Had prior to admission ans was c. diff neg  -Likely partially related to tumor  -Plan to check for c.diff    5)Colon Ca  -Imaging reviewed  -Oncology, rad oncology, CRS, and GI input reviewed  -Was offered plan to go to [facility] to improve performance status to start treatment with chemo and RT followed by possibility of surgery depending on clinical course  -Pt expressed he feels he can't get stronger and wishes to focus on comfort    6)?Bladder CA  -Originally was planned for outpt eval  -Imaging and urology input reviewed  -Was again recommended for outpt eval  -However pt now wishes to pursue comfort focused care so no further w/u    7)S/p Cardiac Arrest  -Due to aspiration 2/2 vomiting  -Successful code with intact mentation, no pressor support needed  -Followed by successful extubation  -Meds per cardio  -No benefit to statin based on overall prognosis so was D/regina  -Pt requests DNR order placed    8)Advance Directives, Goals of Care  -Pt has capacity to make medical decisions  -One of pts sons is HCP  -Goals of Care meeting held with pts 2 sons and pt, and part of the time his daughter as well.  Please see goals of care note for details.  In summary we completed a comfort MOLST and pt indicated he did not wish to try KESHAV and chemo RT but rather pursue comfort focused plan of care to include hospice.  at this time based on his symptom needs and poor prognosis he eligible for hospice and would benefit from inpt hospice for optimal symptom management.

## 2019-06-10 NOTE — CHART NOTE - NSCHARTNOTEFT_GEN_A_CORE
Goals of Care meeting held with pts 2 sons and pt, and part of the time his daughter as well this afternoon. In summary we completed a comfort MOLST and pt indicated he did not wish to try KESHAV and chemo RT but rather pursue comfort focused plan of care to include hospice.  At this time based on his symptom needs and poor prognosis he eligible for hospice and would benefit from inpt hospice for optimal symptom management. Pt and family request referral [facility].  Full detailed goals of care note to follow.

## 2019-06-11 ENCOUNTER — TRANSCRIPTION ENCOUNTER (OUTPATIENT)
Age: 84
End: 2019-06-11

## 2019-06-11 VITALS
HEART RATE: 79 BPM | OXYGEN SATURATION: 99 % | RESPIRATION RATE: 16 BRPM | DIASTOLIC BLOOD PRESSURE: 66 MMHG | SYSTOLIC BLOOD PRESSURE: 127 MMHG | TEMPERATURE: 98 F

## 2019-06-11 LAB
ANION GAP SERPL CALC-SCNC: 7 MMOL/L — SIGNIFICANT CHANGE UP (ref 5–17)
BUN SERPL-MCNC: 16 MG/DL — SIGNIFICANT CHANGE UP (ref 7–23)
C DIFF BY PCR RESULT: SIGNIFICANT CHANGE UP
C DIFF TOX GENS STL QL NAA+PROBE: SIGNIFICANT CHANGE UP
CALCIUM SERPL-MCNC: 8.7 MG/DL — SIGNIFICANT CHANGE UP (ref 8.5–10.1)
CHLORIDE SERPL-SCNC: 109 MMOL/L — HIGH (ref 96–108)
CO2 SERPL-SCNC: 32 MMOL/L — HIGH (ref 22–31)
CREAT SERPL-MCNC: 0.81 MG/DL — SIGNIFICANT CHANGE UP (ref 0.5–1.3)
GLUCOSE SERPL-MCNC: 98 MG/DL — SIGNIFICANT CHANGE UP (ref 70–99)
POTASSIUM SERPL-MCNC: 3 MMOL/L — LOW (ref 3.5–5.3)
POTASSIUM SERPL-SCNC: 3 MMOL/L — LOW (ref 3.5–5.3)
SODIUM SERPL-SCNC: 148 MMOL/L — HIGH (ref 135–145)

## 2019-06-11 RX ORDER — METOPROLOL TARTRATE 50 MG
1 TABLET ORAL
Qty: 0 | Refills: 0 | DISCHARGE
Start: 2019-06-11

## 2019-06-11 RX ORDER — ATORVASTATIN CALCIUM 80 MG/1
1 TABLET, FILM COATED ORAL
Qty: 0 | Refills: 0 | DISCHARGE

## 2019-06-11 RX ORDER — MELOXICAM 15 MG/1
1 TABLET ORAL
Qty: 0 | Refills: 0 | DISCHARGE

## 2019-06-11 RX ORDER — SODIUM CHLORIDE 9 MG/ML
1000 INJECTION, SOLUTION INTRAVENOUS
Refills: 0 | Status: DISCONTINUED | OUTPATIENT
Start: 2019-06-11 | End: 2019-06-11

## 2019-06-11 RX ORDER — FAMOTIDINE 10 MG/ML
1 INJECTION INTRAVENOUS
Qty: 0 | Refills: 0 | DISCHARGE
Start: 2019-06-11

## 2019-06-11 RX ORDER — NYSTATIN CREAM 100000 [USP'U]/G
1 CREAM TOPICAL
Qty: 0 | Refills: 0 | DISCHARGE
Start: 2019-06-11

## 2019-06-11 RX ORDER — ZINC OXIDE 200 MG/G
1 OINTMENT TOPICAL
Qty: 0 | Refills: 0 | DISCHARGE
Start: 2019-06-11

## 2019-06-11 RX ORDER — METOPROLOL TARTRATE 50 MG
1 TABLET ORAL
Qty: 0 | Refills: 0 | DISCHARGE

## 2019-06-11 RX ORDER — POTASSIUM CHLORIDE 20 MEQ
40 PACKET (EA) ORAL ONCE
Refills: 0 | Status: COMPLETED | OUTPATIENT
Start: 2019-06-11 | End: 2019-06-11

## 2019-06-11 RX ORDER — LOPERAMIDE HCL 2 MG
1 TABLET ORAL
Qty: 0 | Refills: 0 | DISCHARGE
Start: 2019-06-11

## 2019-06-11 RX ORDER — MORPHINE SULFATE 50 MG/1
2 CAPSULE, EXTENDED RELEASE ORAL
Qty: 0 | Refills: 0 | DISCHARGE
Start: 2019-06-11

## 2019-06-11 RX ORDER — PROCHLORPERAZINE MALEATE 5 MG
5 TABLET ORAL
Qty: 0 | Refills: 0 | DISCHARGE
Start: 2019-06-11

## 2019-06-11 RX ORDER — SIMETHICONE 80 MG/1
1 TABLET, CHEWABLE ORAL
Qty: 0 | Refills: 0 | DISCHARGE
Start: 2019-06-11

## 2019-06-11 RX ADMIN — Medication 50 MILLIGRAM(S): at 17:33

## 2019-06-11 RX ADMIN — MORPHINE SULFATE 2 MILLIGRAM(S): 50 CAPSULE, EXTENDED RELEASE ORAL at 18:20

## 2019-06-11 RX ADMIN — Medication 0.5 MILLIGRAM(S): at 04:05

## 2019-06-11 RX ADMIN — FAMOTIDINE 20 MILLIGRAM(S): 10 INJECTION INTRAVENOUS at 05:44

## 2019-06-11 RX ADMIN — Medication 325 MILLIGRAM(S): at 15:34

## 2019-06-11 RX ADMIN — FAMOTIDINE 20 MILLIGRAM(S): 10 INJECTION INTRAVENOUS at 17:33

## 2019-06-11 RX ADMIN — Medication 40 MILLIEQUIVALENT(S): at 15:34

## 2019-06-11 RX ADMIN — NYSTATIN CREAM 1 APPLICATION(S): 100000 CREAM TOPICAL at 05:45

## 2019-06-11 RX ADMIN — Medication 2 MILLIGRAM(S): at 15:34

## 2019-06-11 RX ADMIN — Medication 50 MILLIGRAM(S): at 05:44

## 2019-06-11 NOTE — PROGRESS NOTE ADULT - SUBJECTIVE AND OBJECTIVE BOX
HPI: Pt is a 86y old Male with hx of CAD s/p CABG, HTN, OA, HTN was admitted to hospital from home on 5/21 with weakness and diarrhea.  Imaging of his abdomen and Pelvis showed possible early sbo, thickened colon and Pelvic inflammation. While receiving a bowel prep which he was having some difficulty taking, he acutely developed respiratory distress, likely due to aspiration from vomiting and had cardiac arrest Code Blue was called.  Pt survived code and did not require pressors but was on vent but had good mentation.  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  Imaging also revealed bladder mass which was planned to be evaluated as an outpt.  Pt has been evaluated by many specialists including critical care, cardiology, GI, oncology, rad oncology, urology, colorectal surgery for his multitude of issues.  The proposed plan was o have pt go to [facility] to improve his functional status and then receive chemo and rt and then possibly surgery.  Appeared pt and family were unsure about that plan and palliative medicine was consulted for assistance with goals of care.    Pt seen and examined by me for followup of goals of care and symptoms with his daughter present. Pt was sleeping at time of my visit but appeared very comfortable.  Pt was easy to wake.  Again difficult communication due to hypophonia but he able to answer most of my questions.  He states he is comfortable. states his breathing is soso but declines any Morphine at time.  States he slept very well last night after receiving IV ativan which he has not done in a while.    In the previous 24 hrs he did take 2 doses of ativan 0.5mg IV with good effect and no IV Morphine.  He also reports pain on his bottom from the diarrhea and having to repeatedly get cleaned up.      PAIN: ( x)Yes   ( )No  Level: mild     DYSPNEA: (x ) Yes  ( ) No  Level: mild- mod with any movement    Review of Systems:    Anxiety- mild- mod, anup at night, improved with ativan  Depression- denies  Physical Discomfort- mild  Dyspnea- mod, intermittent  Constipation- denies  Diarrhea- severe  Nausea- denies  Vomiting- none recently  Anorexia- severe  Weight Loss- confounded due to edema  Cough- mild- mod  Secretions- mild, unable to expectorate  Fatigue- mild  Weakness- severe  Delirium- none    All other systems reviewed and negative    PHYSICAL EXAM:    Vital Signs Last 24 Hrs  T(C): 36.4 (11 Jun 2019 10:45), Max: 36.7 (10 Dean 2019 13:45)  T(F): 97.6 (11 Jun 2019 10:45), Max: 98.1 (10 Dean 2019 13:45)  HR: 79 (11 Jun 2019 10:45) (71 - 96)  BP: 127/66 (11 Jun 2019 10:45) (111/52 - 137/69)  RR: 16 (11 Jun 2019 10:45) (16 - 18)  SpO2: 99% (11 Jun 2019 10:45) (92% - 100%)    PPSV2: 30  %    General: pleasant elderly male, mostly comfortable  Mental Status: AOx4, sleeping intially but very easy to wake  HEENT: EOMI, very dry oral mucosa, hypophonic s/p intubation  Lungs: coarse breath sounds b/l  Cardiac: S1S2+  GI: +hyperactive bowel sounds, NT/palp  : voiding incontinent  Ext: moves all 4 exts but very limited LE due to weakness  Neuro: mentally intact, + hypophonia, + generalized weakness      LABS                          11.6   10.11 )-----------( 257      ( 10 Dean 2019 12:06 )             38.0     06-11    148<H>  |  109<H>  |  16  ----------------------------<  98  3.0<L>   |  32<H>  |  0.81    Ca    8.7      11 Jun 2019 06:38    RADIOLOGY/ADDITIONAL STUDIES:  EXAM:  MR PELVIS WAW IC                        PROCEDURE DATE:  06/06/2019    COMPARISON: CT abdomen pelvis 5/24/2019    FINDINGS:    TUMOR LOCATION: Mid (5.1-10.0 cm)  Distance of lowest extent of tumor from anal verge: 5.0 cm  Distance of lowest extent of tumor from top of anal sphincter: 1.9 cm  Relationship toanterior peritoneal reflection: At or straddles    TUMOR CHARACTERISTICS:  Craniocaudal extent: 5.1 cm (series 3 image 15)  Morphology: Circumferential  Circumferential extent/location (clock face): Circumferential  Mucin content: No or minimal mucincontent.    T STAGE:   Extramural depth of invasion: 7 mm as measured along the posterior wall   (series 4 image 13)  MR-T category: T3c (tumor penetrates 5 - 15 mm beyond muscularis propria)    Functional Sequences  DWI: Restricted diffusion in tumor or tumor bed is present.  DCE: N/A    EXTRAMURAL VASCULAR INVASION (EMVI): Absent.    CRM (FOR T3 ONLY):  ---Shortest distance of definitive tumor border/spiculations to the MRF:    8 mm. Location: Posterior (series 4 image 13)  Extramural depth of invasion at this level: 7 mm    Is there a separate tumor deposit, LN or EMVI threatening (? 1mm and ?   2mm) or invading (<1mm) the MRF: No    LYMPH NODES (suspicious criteria: round shape, irregular borders,   heterogeneous signal intensity):  Mesorectal/Superior Rectal Lymph Nodes: N0: No visible lymph   nodes/deposits  Extramesorectal lymph nodes (locoregional including internal iliac and   obturator; nonregional (M1) including external iliac, common iliac,   retroperitoneal, and inguinal): No  ADDITIONAL COMMENTS: Asymmetric thickening of the right lateral wall   concerning for primary bladder neoplasm. Cystoscopic correlation is   advised.  Abnormal inflammatory enhancement in the bilateral obturator internus and   gluteus medius/minimus muscles with small areas of nonenhancement.   Differential includes myositis and/or pyomyositis.    IMPRESSION:  T3c, N0, Mx tumor in the mid rectum.  Mesorectal fascia/circumferential resection margin: Clear (tumor margin >   2 mm).  Sphincter involvement: Absent.   Asymmetric thickening of the right lateral urinary bladder wall   concerning for primary bladder neoplasm. Cystoscopic correlation is   advised.  Abnormal inflammatory enhancement in the bilateral obturator internus and   gluteus medius/minimus muscles with small areas of nonenhancement.   Differential includes myositis and/or pyomyositis.    EXAM:  XR CHEST PORTABLE URGENT 1V                        PROCEDURE DATE:  05/31/2019    INTERPRETATION:  AP erect chest on May 31, 2019 at 8:27 AM. Study of May   29 showed left perihilar and left lower lobe infiltrate.    On present examination sternotomy again noted. Endotracheal tube present   on May 29 has been removed. Nasogastric tube remains.  There is an increasing infiltrate off the left hilum and there are now   mild to moderate developing effusions.    IMPRESSION: Endotracheal tube removed. Increasing lung and pleural   findings.      EXAM:  XR CHEST PORTABLE ROUTINE 1V                        PROCEDURE DATE:  05/29/2019    COMPARISON:  May 28, 2019    FINDINGS:  Endotracheal tube with tip above the rosa elena. Enteric tube with   tip in the stomach. Sternotomy wires and mediastinal clips.  Unremarkable cardiac and mediastinal contours. Small left pleural   effusion. Hazy opacity in the left perihilar and lower lung regions, more   ill-defined than on the prior study.    IMPRESSION:  Left perihilar and lower lung opacity suggestive of pneumonia in the   proper clinical setting.  Small left pleural effusion.    EXAM:  CT ABDOMEN AND PELVIS OC IC                        PROCEDURE DATE:  05/24/2019      FINDINGS:  LOWER CHEST: There is interval development of small bilateral pleural   effusions with adjacent atelectasis. Patient is status post sternotomy   and CABG.  LIVER: Within normal limits.  BILE DUCTS: Normal caliber.  GALLBLADDER: Cholecystectomy clips are present increased artifact in the   surrounding region.  SPLEEN: Within normal limits.  PANCREAS: There are a few calcifications within the tail of the pancreas.   Chronic pancreatitis cannot be excluded. Please correlate clinically.  ADRENALS: Within normal limits.  KIDNEYS/URETERS: Right renal cyst measuring up to 2.1 cm in the upper   pole laterally. Bilateral mild-to-moderate hydronephrosis and mild   hydroureter to the level of the pelvis. This is unchanged.    BLADDER: Again noted is marked asymmetric wall thickening of the right   side of the bladder extending anteriorly and posteriorly. Underlying   neoplastic process cannot be excluded.  REPRODUCTIVE ORGANS: Small calcifications in the prostate gland.   Infiltrative soft tissue changes in the presacral and perirectal region   are unchanged.    BOWEL: There is no bowel obstruction. Again noted is wall thickening   throughout the colon which is mild in appearance. There are regions of   underdistended sigmoid colon which limit evaluation. Appendix not well   visualized. No focal inflammatory changes are seen. There is mild   nodularity and wall thickening in the gastric antrum. Underlying neoplasm   cannot be excluded.  PERITONEUM: Mild ascites has increased. There is mild soft tissue   stranding in the left lateral pelvis which is unchanged. Bilateral   inguinal hernias containing fat are present.  VESSELS:  There are vascular calcifications. There is mild dilatation of   the distal abdominal aorta measuring up to approximately 2.5 cm when   compared with 2.0 cm more superiorly.  RETROPERITONEUM: No lymphadenopathy.    ABDOMINAL WALL: There is mild subcutaneous edema.  BONES: Degenerative changes of bone are present.    IMPRESSION:     Mild wall thickening of the colon raising concern for colitis. This does   not appear significantly changed. Suggestion of wall thickening with mild   nodularity in the gastric antrum. Underlying neoplasm cannot be excluded.  Progression of mild ascites and interval development of small bilateral   pleural effusions. Presacral and perirectal soft tissue stranding and   soft tissue stranding in the left lateral pelvis are unchanged when   compared with the prior study.  Asymmetric wall thickening of the right side of the bladder extending   posteriorly and anteriorly. Underlying neoplastic process, be excluded.   Bilateral mild to moderate hydronephrosis and mild hydroureter which may   be related to the bladder process. Please correlate clinically.    EXAM:  CT ABDOMEN AND PELVIS OC IC                        PROCEDURE DATE:  05/21/2019    FINDINGS:    LOWER CHEST: Coronary vascular calcification. Trace right pleural   effusion with minimal bibasilar atelectasis.    ABDOMEN AND PELVIS:  LIVER: within normal limits.  BILE DUCTS: Minimal biliary dilatation.  GALLBLADDER: Prior cholecystectomy.  PANCREAS: within normal limits.  SPLEEN: within normal limits.    ADRENALS: within normal limits.  KIDNEYS, URETERS AND BLADDER: Kidneys enhance bilaterally and   symmetrically. Bilateral chronic UPJ obstructions. Bilateral parapelvic   cysts. Exophytic anterior right upper pole renal cyst. Additional   subcentimeter hypodensities bilaterally to smallto characterize. No   intrarenal calculi. Ureters unremarkable. Marked thickening of the right   lateral and anterior bladder wall.    REPRODUCTIVE ORGANS: Extensive infiltrative changes in the pelvis and   presacral region with additional small ascites. Infiltrative changes   along the bilateral pelvic sidewall.    BOWEL: Pancolonic thickening with pericolonic infiltrative changes   compatible with pancolitis, differential including infectious versus   inflammatory etiology with consideration for C. Difficile infection.   Mildly prominent small bowel loops without evidence of obstruction.   Normal appendix. Thickening along the gastric antrum, may represent   gastritis however recommend direct visualization to exclude mucosal   abnormality.    PERITONEUM: no trace to small ascites. Extensive pelvic infiltrative   changes. No free air. No discrete drainable fluid collections.    VESSELS: Atherosclerotic changes .  RETROPERITONEUM: Within normal limits.      ABDOMINAL WALL: within normal limits.  BONES: Degenerative changes and osteopenia.    IMPRESSION:      Pancolonic thickening with pericolonic infiltrative changes compatible   with pancolitis, differential including infectious versus inflammatory   etiology with consideration for C. Difficile infection. Mildly prominent   small bowel loops without evidence of obstruction  Thickening along the gastric antrum, may represent gastritis however   recommend direct visualization to exclude mucosal abnormality.  Marked thickening along the right lateral and anterior bladder wall   recommend correlation with urinalysis as well as direct visualization to   exclude neoplasm.    Extensive infiltrate changes in the pelvic fat nonspecific in nature.

## 2019-06-11 NOTE — PROGRESS NOTE ADULT - ASSESSMENT
Pt is a 86y old Male with hx of CAD s/p CABG, HTN, OA, HTN was admitted to hospital from home on 5/21 with weakness and diarrhea.  Imaging of his abdomen and Pelvis showed possible early sbo, thickened colon and Pelvic inflammation. While receiving a bowel prep which he was having some difficulty taking, he acutely developed respiratory distress, likely due to aspiration from vomiting and had cardiac arrest Code Blue was called.  Pt survived code and did not require pressors but was on vent but had good mentation.  Pt had GI bleeding for which he had EGD and colonoscopy and bx of colon lesion came back for poorly differentiated adenocarcinoma.  Imaging also revealed bladder mass which was planned to be evaluated as an outpt.  Pt has been evaluated by many specialists including critical care, cardiology, GI, oncology, rad oncology, urology, colorectal surgery for his multitude of issues.  The proposed plan was o have pt go to [facility] to improve his functional status and then receive chemo and rt and then possibly surgery.  Appeared pt and family were unsure about that plan and palliative medicine was consulted for assistance with goals of care.    1)Resp Distress  -s/p pneumonia with residual cough and significant difficulty expectorating  -Cont supplemental oxygen  -Occurs with exertion  -Pt is hesitant to report symptoms so encourage staff to assess frequently  -Also exacerbated by cough  -Added Morphine 2mg IV q 3hr PRN but pt has not yet used it    2)Cough  -Likely residual form pneumonia, intubation  -Difficulty expectoration sputum  -Causes some SOB  -Morphine as noted above for symptomatic relief    3)Anxiety/Insomnia  -Has been taking Ambien with poor result  -Related to nightime anxiety  -D/regina Ambien and started PRN ativan as his insomnia is mostly related to anxiety  -Pt slept very well last night s/p IV ativan    4)Diarrhea  -Persistent  -Had prior to admission ans was c. diff neg  -Likely partially related to tumor  -Plan to check for c.diff    5)Colon Ca  -Imaging reviewed  -Oncology, rad oncology, CRS, and GI input reviewed  -Was offered plan to go to [facility] to improve performance status to start treatment with chemo and RT followed by possibility of surgery depending on clinical course  -Pt expressed he feels he can't get stronger and wishes to focus on comfort    6)?Bladder CA  -Originally was planned for outpt eval  -Imaging and urology input reviewed  -Was again recommended for outpt eval  -However pt now wishes to pursue comfort focused care so no further w/u    7)S/p Cardiac Arrest  -Due to aspiration 2/2 vomiting  -Successful code with intact mentation, no pressor support needed  -Followed by successful extubation  -Meds per cardio  -No benefit to statin based on overall prognosis so was D/regina  -Pt requests DNR order placed    8)Advance Directives, Goals of Care  -Pt has capacity to make medical decisions  -One of pts sons is HCP  -Goals of Care meeting held 6/10 with pts 2 sons and pt, and part of the time his daughter as well.  Please see goals of care note for details.  In summary we completed a comfort MOLST and pt indicated he did not wish to try KESHAV and chemo RT but rather pursue comfort focused plan of care to include hospice.  at this time based on his symptom needs and poor prognosis he eligible for hospice and would benefit from inpt hospice for optimal symptom management.  -hospice referral in progress

## 2019-06-11 NOTE — DISCHARGE NOTE PROVIDER - HOSPITAL COURSE
This patient is an 86 year old male with hx. of CAD, s/p CABG about 5 years ago who presented with weakness and persistent diarrhea. CT of abdomen and Pelvis showed ? early sbo, thickened colon and Pelvic inflammation. The patient was receiving a bowel prep which he was having some difficulty taking. He acutely developed respiratory distress, ? vomiting ? aspiration and than had cardiac arrest Code Blue was called.  On arrival cpr was started , patient was emergently intubated. Patient received 1 epinephrine, approximately 5 minutes of cpr. Then ROSC was obtained. Patient did awake and follow commands after arrest, OG tube was placed. ekg no acute st changes.  Focused echo, good lv function, not on pressors        5/28: Patient following commands, Worsening L Infiltrate on CXR, Weaned for a while this morning    5/29: CXR improved and weaned well and extubated this AM.  Still with significant NGT drainage overnight    5/30: Doing well extubated.  Decreasing NGT drainage.  Coughing up some blood.      5/31: on [place] O2, Increased PVC on CXR.  For EGD and Sigmoidoscopy today    6/1: Tolerating PO, No other events    6/2/19- Patient seen and examined at bedside with son. Patient states he feels tired/weak, no acute overnight events.    6/3/19- Patient seen and examined at bedside with son. Patient states he feels alright, patient tired, having trouble speaking, but able to communicate with hand gestures and writing. Patient states he feels hungry today.    6/4/19- Patient seen and examined at bedside. Patient resting comfortably in bed, but looks weak. Patient still with trouble speaking. Family states patient eating some food. Patient denies any CP, SOB.    6/5/19- Patient seen and examined at bedside. Patient resting in bed comfortably. Complains of mild abdominal pain today. Patient tolerating diet. Spoke with patient and family at bedside regarding pathology results. Explained we would need to run more tests and involve Heme/Onc, Surgery in care going forward. Patient expresses understanding. States "I don't know" when questioned how he felt after hearing the news. Emotional support provided by family. All questions answered.    6/10/19- Patient seen and examined at bedside. 6 times diarrhea today, no blood in stools . Still having trouble speaking. Patient denies any pain today.     6/11- patient sleeping, family did not want him to be woken for exam or review of systems at this time, as they are planning for hospice and focus on comfortcare    patient with persistent diarrhea , diarrhea related rash to sacral area as per nurse    unable to examine upon family's request        A/P        #Hypoxic respiratory failure likely from aspiration. 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    as per hemonc -, although option of rehab and RT and chemo  and then surgical resection was recommended but patient's frail condition would make treatment with RT and chemo and then surgery very challenging    family and patient opted for hospice     colorectal/hemonc and palliative care consults appreciated     Found on rectal biopsy (proximal and distal rectum) during colonoscopy            # Cannot exclude primary bladder neoplasm    patient and family        #S/P Cardiac arrest    #AT with brief Afib    Cardio consulted, appreciate recs    -Continue ASA 325mg    -Continue BB,    -Patient with recent hematuria, would hold off on A/C at this time, however CHADSVASC 4        #Aphonia from recent intubation    needs supportive care     -Patient recently intubated- extubated 5/29    ENT consult appreciated             #CAD s/p CABG    # hx anemia    #HTN            Plan is to focus on comfort care         discharge delia spent 65mins     I discussed with family son  and daughter at bedside     I discussed with pallitaive team and hemonc and casemenjoe and RN This patient is an 86 year old male with hx. of CAD, s/p CABG about 5 years ago who presented with weakness and persistent diarrhea. CT of abdomen and Pelvis showed ? early sbo, thickened colon and Pelvic inflammation. The patient was receiving a bowel prep which he was having some difficulty taking. He acutely developed respiratory distress, ? vomiting ? aspiration and than had cardiac arrest Code Blue was called.  On arrival cpr was started , patient was emergently intubated. Patient received 1 epinephrine, approximately 5 minutes of cpr. Then ROSC was obtained. Patient did awake and follow commands after arrest, OG tube was placed. ekg no acute st changes.  Focused echo, good lv function, not on pressors        5/28: Patient following commands, Worsening L Infiltrate on CXR, Weaned for a while this morning    5/29: CXR improved and weaned well and extubated this AM.  Still with significant NGT drainage overnight    5/30: Doing well extubated.  Decreasing NGT drainage.  Coughing up some blood.      5/31: on [place] O2, Increased PVC on CXR.  For EGD and Sigmoidoscopy today    6/1: Tolerating PO, No other events    6/2/19- Patient seen and examined at bedside with son. Patient states he feels tired/weak, no acute overnight events.    6/3/19- Patient seen and examined at bedside with son. Patient states he feels alright, patient tired, having trouble speaking, but able to communicate with hand gestures and writing. Patient states he feels hungry today.    6/4/19- Patient seen and examined at bedside. Patient resting comfortably in bed, but looks weak. Patient still with trouble speaking. Family states patient eating some food. Patient denies any CP, SOB.    6/5/19- Patient seen and examined at bedside. Patient resting in bed comfortably. Complains of mild abdominal pain today. Patient tolerating diet. Spoke with patient and family at bedside regarding pathology results. Explained we would need to run more tests and involve Heme/Onc, Surgery in care going forward. Patient expresses understanding. States "I don't know" when questioned how he felt after hearing the news. Emotional support provided by family. All questions answered.    6/10/19- Patient seen and examined at bedside. 6 times diarrhea today, no blood in stools . Still having trouble speaking. Patient denies any pain today.     6/11- patient sleeping, family did not want him to be woken for exam or review of systems at this time, as they are planning for hospice and focus on comfortcare    patient with persistent diarrhea , diarrhea related rash to sacral area as per nurse    unable to examine upon family's request        A/P        #Hypoxic respiratory failure likely from aspiration. 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    as per hemonc -, although option of rehab and RT and chemo  and then surgical resection was recommended but patient's frail condition would make treatment with RT and chemo and then surgery very challenging    family and patient opted for hospice     colorectal/hemonc and palliative care consults appreciated     Found on rectal biopsy (proximal and distal rectum) during colonoscopy            # Cannot exclude primary bladder neoplasm    patient and family        #S/P Cardiac arrest    #AT with brief Afib    Cardio consulted, appreciate recs    -Continue ASA 325mg    -Continue BB,    -Patient with recent hematuria, would hold off on A/C at this time, however CHADSVASC 4        #Aphonia from recent intubation    needs supportive care     -Patient recently intubated- extubated 5/29    ENT consult appreciated             #CAD s/p CABG    # hx anemia    #HTN            Plan is to focus on comfort care         discharge delia spent 65mins     I discussed with family son  and daughter at bedside     I discussed with pallitaive team and hemonc and casemenjoe and RN

## 2019-06-11 NOTE — ADVANCED PRACTICE NURSE CONSULT - ASSESSMENT
This is an 86 year old male that was admitted to the hospital on 5/21/2019 for diarrhea.  PMH- CAD s/p CABG, HTN, OA, HTN.    Requested by RN to assess patient for management multiple bouts of liquid diarrhea.  Patient presents on an Atmos Air 9000 MRS receiving pericare after being incontinent of liquid stool. Buttocks, rectum and scrotum noted to be erythema and tender. OSKAR unable to be performed completely due to resistance upon exam. Patient is not a candidate for dignishield.  Unable to place fecal incontinent pouch due to denuded skin. Cavilon advance placed to buttocks for protection. New purple absorbent pad placed to wick away moisture.  Patient requesting to stay on his backside. Heels elevated off mattress with pillow.

## 2019-06-11 NOTE — PROGRESS NOTE ADULT - PROVIDER SPECIALTY LIST ADULT
Cardiology
Colorectal Surgery
Colorectal Surgery
Critical Care
Gastroenterology
Hospitalist
Infectious Disease
Colorectal Surgery
Infectious Disease
Critical Care
Gastroenterology
Hospitalist
Hospitalist
Infectious Disease
Palliative Care
Cardiology
Gastroenterology
Hospitalist
Heme/Onc
Critical Care

## 2019-06-11 NOTE — ADVANCED PRACTICE NURSE CONSULT - RECOMMEDATIONS
1) Turn and position every 2 hours  2) Elevate heels off mattress  3) Apply Cavilon advance every 3 days. Do not place any creams or powders to the area  4) No diapers please

## 2019-06-11 NOTE — DISCHARGE NOTE NURSING/CASE MANAGEMENT/SOCIAL WORK - NSDCDPATPORTLINK_GEN_ALL_CORE
You can access the [facility] Portal, offered by [facility], by registering with the following website: [email or web address]

## 2019-06-11 NOTE — DISCHARGE NOTE PROVIDER - NSDCCPCAREPLAN_GEN_ALL_CORE_FT
PRINCIPAL DISCHARGE DIAGNOSIS  Diagnosis: Diarrhea, unspecified type  Assessment and Plan of Treatment: rectal CA with persistent diarrhea /poor prognosis- for hos[pice      SECONDARY DISCHARGE DIAGNOSES  Diagnosis: Weakness  Assessment and Plan of Treatment:     Diagnosis: Dehydration  Assessment and Plan of Treatment:

## 2019-06-11 NOTE — GOALS OF CARE CONVERSATION - PERSONAL ADVANCE DIRECTIVE - CONVERSATION DETAILS
Late Entry (meeting Held 6/10 and summary noted then) This is full detailed description of 90 minute meeting held.    Met with pt, his 2 sons, and daughter Krystina at bedside.  I hade briefly met with them earlier in the day but we reviewed in detail the role of palliative medicine, pt and his sons shared what life was like for the patient PTA and we discussed current medical issues.    Pt is hypophonic so very difficult to understand what he is saying but able to participate and at times his family fills in part of history.  they explain he had been living alone for almost 6 years since his wife .  He was independent but did use a cane which eventually turned into a walker.  He had bad OA in many of his joints, especially his knee and neck.  They shared that by November they all made decision with pts agreement to have him stop driving because OA in his neck was limiting hs ability to drive safely.  they share over last several weeks he had been getting slower and declining a little bit.  For the few weeks PTA he had a aide coming for a few hours a day to help with meal prep.    when asked about what was important to him, pt expressed his family.  He shared he has 4 kids and 5 grandchildren and he loves being with them.  He shares that quality of life is important to benton.  He explains he was the caregiver for his wife Yessica who had multiple myeloma and watched experience with treatment, chemotx and eventual placement in inpt hospice.  He explains he knows the medical team had wanted him to go to [facility] but he feels like he can't do it.    Medically they explain that a few weeks PTA pt had developed diarrhea and weakness.  It persisted and that was prompted his admission.  Pt has had a very complicated course over nearly 3 weeks.  When asked to share from his perspective what he understood he again expressed feeling like he could not go to [facility] because he was too weak and he knows he won't be able to get stronger.  We acknowledged his concern and discussed this later in detail.  We reviewed together a summary of complications including ?sbo, aspiration of bowel prep, subsequent cardiac arrest, which he successfully survived CPR with intact mentation, GI bleeding, masses in colon and bladder, with Path + dx of colon cancer and plan for outpt workup of bladder mass via cystoscopy, bx as outpt.  the bladder mass was found PTA but because pt was feeling well with diarrhea never was able to have procedure PTA.  And finally we reviewed the potential option of KESHAV with goal of improving mobility, performance staus to be able to receive RT, chemo for colon Ca and then even possibly surgery if pt tolerated chemo, RT ok.    Pt clearly expressed he doesn't want to go to [facility] because he feels he is incapable of getting stronger.  We validated his concern and discussed alternative options.  Pt also verbalized while we were discussing events of hospitalization, specifically CPR, that if situation should arise again he would like be able to die and not undergo CPR again.  I advised him we would put those orders in.    Based on pts wishes we discussed hospice care.  Discussed home vs SNF vs inpt hospice.  Reviewed philosophy and criteria.  Pt and family were familiar with hospice because pts wife  [facility].  During our discussion it became evident that was having sym[toms that he was reporting, because, as per his family, he has never been a complainer. Pt and family also reported decreased PO intake.  by then end of the meeting it seemed clear that pt was most appropriate for inpt hospice due to his symptom burden and very poor prognosis.      As noted earlier pt had expressed preference for no further attempts at cardiac resuscitation so we completed a MOLST.  Pt is capacitated so was able to make all decisions independently though his family supported each decision.  MOLST indicated comfort measures only, DNI, DNR, do not send to hospital, No feeding tube, no IVFS, determine use or limitation of abx.    Explained hospice referral process and emotional support provided to pt and family.      Spent 90 minutes with pt and family discussing advance directives, therapeutic alternatives, and advance care planning.    D/w Dr. Velarde, bedside RN Meredith,   and LMJERZY Reinoso after meeting

## 2019-06-13 LAB
CULTURE RESULTS: SIGNIFICANT CHANGE UP
SPECIMEN SOURCE: SIGNIFICANT CHANGE UP

## 2019-06-17 DIAGNOSIS — C67.9 MALIGNANT NEOPLASM OF BLADDER, UNSPECIFIED: ICD-10-CM

## 2019-06-17 DIAGNOSIS — K52.9 NONINFECTIVE GASTROENTERITIS AND COLITIS, UNSPECIFIED: ICD-10-CM

## 2019-06-17 DIAGNOSIS — I10 ESSENTIAL (PRIMARY) HYPERTENSION: ICD-10-CM

## 2019-06-17 DIAGNOSIS — Z51.5 ENCOUNTER FOR PALLIATIVE CARE: ICD-10-CM

## 2019-06-17 DIAGNOSIS — Z95.1 PRESENCE OF AORTOCORONARY BYPASS GRAFT: ICD-10-CM

## 2019-06-17 DIAGNOSIS — J96.01 ACUTE RESPIRATORY FAILURE WITH HYPOXIA: ICD-10-CM

## 2019-06-17 DIAGNOSIS — I25.10 ATHEROSCLEROTIC HEART DISEASE OF NATIVE CORONARY ARTERY WITHOUT ANGINA PECTORIS: ICD-10-CM

## 2019-06-17 DIAGNOSIS — E86.0 DEHYDRATION: ICD-10-CM

## 2019-06-17 DIAGNOSIS — E43 UNSPECIFIED SEVERE PROTEIN-CALORIE MALNUTRITION: ICD-10-CM

## 2019-06-17 DIAGNOSIS — N17.0 ACUTE KIDNEY FAILURE WITH TUBULAR NECROSIS: ICD-10-CM

## 2019-06-17 DIAGNOSIS — Z79.82 LONG TERM (CURRENT) USE OF ASPIRIN: ICD-10-CM

## 2019-06-17 DIAGNOSIS — E78.5 HYPERLIPIDEMIA, UNSPECIFIED: ICD-10-CM

## 2019-06-17 DIAGNOSIS — J69.0 PNEUMONITIS DUE TO INHALATION OF FOOD AND VOMIT: ICD-10-CM

## 2019-06-17 DIAGNOSIS — Z66 DO NOT RESUSCITATE: ICD-10-CM

## 2019-06-17 DIAGNOSIS — K92.2 GASTROINTESTINAL HEMORRHAGE, UNSPECIFIED: ICD-10-CM

## 2019-06-17 DIAGNOSIS — E87.0 HYPEROSMOLALITY AND HYPERNATREMIA: ICD-10-CM

## 2019-06-17 DIAGNOSIS — C20 MALIGNANT NEOPLASM OF RECTUM: ICD-10-CM

## 2019-06-17 DIAGNOSIS — I46.9 CARDIAC ARREST, CAUSE UNSPECIFIED: ICD-10-CM

## 2019-06-17 DIAGNOSIS — Z96.651 PRESENCE OF RIGHT ARTIFICIAL KNEE JOINT: ICD-10-CM

## 2020-09-10 NOTE — ED ADULT NURSE NOTE - OBJECTIVE STATEMENT
Pt BIB EMS complaining of abd pain. Pt states this has been going on since the 3rd week of April. Pt states it started with diarrhea and has since progressed to soft stool. Pt states overall weak feeling. Pt is being followed by PCP for same issue, as per pts family PCP tested for Cdiff and it was negative. Pt denies any chest pain, sob, N/V. Pt states he didn't know if he was having fevers or not. Star Wedge Flap Text: The defect edges were debeveled with a #15 scalpel blade.  Given the location of the defect, shape of the defect and the proximity to free margins a star wedge flap was deemed most appropriate.  Using a sterile surgical marker, an appropriate rotation flap was drawn incorporating the defect and placing the expected incisions within the relaxed skin tension lines where possible. The area thus outlined was incised deep to adipose tissue with a #15 scalpel blade.  The skin margins were undermined to an appropriate distance in all directions utilizing iris scissors.

## 2022-12-13 NOTE — PROGRESS NOTE ADULT - ASSESSMENT
85yo/M with PMH CAD s/p CABG in 2005, HTN, hyperlipidemia, OA s/p RT TKR, admitted on 5/21 for evaluation of 4 weeks diarrhea that started as pasty stool but progressed to liquid watery stool. No prior antibiotics, no recent travel or food ingestion. No sick contacts and notes improvement in diarrhea since starting antibiotics. No abdominal pain and no fever. No nausea or vomiting.  1. Patient admitted with diarrhea, found to have pan colitis, infectious versus inflammatory  - follow up cultures   - iv hydration and supportive care   - oxygen and nebs as needed   - serial cbc and monitor temperature   - reviewed prior medical records to evaluate for resistant or atypical pathogens   - given nausea; will change antibiotics to zosyn  - to have repeat imaging  - GI evaluation in progress  2. other issues; per medicine Doxycycline Counseling:  Patient counseled regarding possible photosensitivity and increased risk for sunburn.  Patient instructed to avoid sunlight, if possible.  When exposed to sunlight, patients should wear protective clothing, sunglasses, and sunscreen.  The patient was instructed to call the office immediately if the following severe adverse effects occur:  hearing changes, easy bruising/bleeding, severe headache, or vision changes.  The patient verbalized understanding of the proper use and possible adverse effects of doxycycline.  All of the patient's questions and concerns were addressed.

## 2023-08-15 NOTE — H&P ADULT - NSHPLABSRESULTS_GEN_ALL_CORE
Problem: Respiratory - Adult  Goal: Achieves optimal ventilation and oxygenation  Outcome: Progressing 11.9   5.69  )-----------( 193      ( 21 May 2019 11:06 )             36.9     21 May 2019 11:06    140    |  104    |  14     ----------------------------<  88     4.0     |  33     |  1.13     Ca    9.3        21 May 2019 11:06  Mg     1.8       21 May 2019 11:06    TPro  7.0    /  Alb  3.4    /  TBili  0.7    /  DBili  x      /  AST  18     /  ALT  15     /  AlkPhos  86     21 May 2019 11:06    LIVER FUNCTIONS - ( 21 May 2019 11:06 )  Alb: 3.4 g/dL / Pro: 7.0 gm/dL / ALK PHOS: 86 U/L / ALT: 15 U/L / AST: 18 U/L / GGT: x

## 2023-10-11 NOTE — PATIENT PROFILE ADULT - DO YOU FEEL UNSAFE AT SCHOOL?
Patient seen and examined at bedside with NP present. Agree with the above plan and recommendations. Patient seen and examined at the bedside with NP present. Agree with above plan and recommendations. initial consult seen with above,    33M history significant for uncontrolled diabetes A1c 15.5%, chronic alcohol abuse (reportedly last drink few months ago), pAfib RVR in 5/2021 in setting of DKA/EtOH withdrawal with prior LV EF 45%, was supposed to be on Eliquis but self discontinued 3 months ago and has not follow up in the office, presents with recurrent DKA, currently not on telemetry but EKG in sinus rhythm and repeat Echo LV EF normalized with normal LA size  -given noncompliance with med/follow up and with h/o chronic alcohol abuse with mild anemia overall risk of bleeding probably outweighs benefit of stroke prevention as now CHADSVAsc 1 (diabetes) and prior pAfib occurred in setting of stress, shared-decision making will discontinue Eliquis and switch to ASA 81mg. Follow up in the office to obtain 30-days MCOT and also smart-watch device to self monitor if pAfib recurrence   -reiterated needs compliance with insulin and office follow up  -reconsult if new cardiac issue arise.           Darrin Chavez DO, [facility]  Faculty Non-Invasive Cardiologist  [phone] no